# Patient Record
Sex: MALE | Race: BLACK OR AFRICAN AMERICAN | Employment: UNEMPLOYED | URBAN - METROPOLITAN AREA
[De-identification: names, ages, dates, MRNs, and addresses within clinical notes are randomized per-mention and may not be internally consistent; named-entity substitution may affect disease eponyms.]

---

## 2017-03-03 ENCOUNTER — OFFICE VISIT (OUTPATIENT)
Dept: CARDIOLOGY CLINIC | Age: 82
End: 2017-03-03

## 2017-03-03 VITALS
WEIGHT: 155 LBS | BODY MASS INDEX: 22.19 KG/M2 | SYSTOLIC BLOOD PRESSURE: 134 MMHG | HEIGHT: 70 IN | DIASTOLIC BLOOD PRESSURE: 54 MMHG | HEART RATE: 86 BPM

## 2017-03-03 DIAGNOSIS — I25.10 ATHEROSCLEROSIS OF NATIVE CORONARY ARTERY OF NATIVE HEART WITHOUT ANGINA PECTORIS: Primary | ICD-10-CM

## 2017-03-03 DIAGNOSIS — N18.9 CHRONIC KIDNEY DISEASE, UNSPECIFIED: ICD-10-CM

## 2017-03-03 DIAGNOSIS — I50.42 CHRONIC COMBINED SYSTOLIC AND DIASTOLIC HEART FAILURE (HCC): ICD-10-CM

## 2017-03-03 DIAGNOSIS — I10 ESSENTIAL HYPERTENSION, BENIGN: ICD-10-CM

## 2017-03-03 DIAGNOSIS — E78.5 HYPERLIPIDEMIA, UNSPECIFIED HYPERLIPIDEMIA TYPE: ICD-10-CM

## 2017-03-03 RX ORDER — GLIMEPIRIDE 1 MG/1
TABLET ORAL
COMMUNITY
End: 2017-06-22

## 2017-03-03 RX ORDER — OXYCODONE AND ACETAMINOPHEN 10; 325 MG/1; MG/1
1 TABLET ORAL
COMMUNITY

## 2017-03-03 RX ORDER — CALCIUM ACETATE 667 MG/1
CAPSULE ORAL
COMMUNITY

## 2017-03-03 NOTE — PROGRESS NOTES
1. Have you been to the ER, urgent care clinic since your last visit? Hospitalized since your last visit? Yes Where: [facility] Reason for visit: CHF    2. Have you seen or consulted any other health care providers outside of the [address] [place] [address] since your last visit? Include any pap smears or colon screening. No     3. Since your last visit, have you had any of the following symptoms? .           4. Have you had any blood work, X-rays or cardiac testing? 5.  Where do you normally have your labs drawn? [facility]    6. Do you need any refills today?    No

## 2017-03-03 NOTE — MR AVS SNAPSHOT
Visit Information Date & Time Provider Department Dept. Phone Encounter #  
 3/3/2017 11:15 AM Randolph Cross MD [facility] [phone] Follow-up Instructions Return in about 3 months (around 6/3/2017). Upcoming Health Maintenance Date Due DTaP/Tdap/Td series (1 - Tdap) 10/15/1956 ZOSTER VACCINE AGE 60> 10/15/1995 GLAUCOMA SCREENING Q2Y 10/15/2000 Pneumococcal 65+ Low/Medium Risk (1 of 2 - PCV13) 10/15/2000 MEDICARE YEARLY EXAM 10/15/2000 INFLUENZA AGE 9 TO ADULT 8/1/2016 Allergies as of 3/3/2017  Review Complete On: 3/3/2017 By: Randolph Cross MD  
  
 Severity Noted Reaction Type Reactions Vancomycin  03/20/2013   Systemic Vertigo Current Immunizations  Never Reviewed No immunizations on file. Not reviewed this visit You Were Diagnosed With   
  
 Codes Comments Atherosclerosis of native coronary artery of native heart without angina pectoris    -  Primary ICD-10-CM: I25.10 ICD-9-CM: 414.01 stable 
no angina Essential hypertension, benign     ICD-10-CM: I10 
ICD-9-CM: 401.1 controlled Chronic combined systolic and diastolic heart failure (HCC)     ICD-10-CM: I50.42 
ICD-9-CM: 428.42 recent admission 
feels better 
lvef better Chronic kidney disease, unspecified     ICD-10-CM: N18.9 ICD-9-CM: 585.9 stable Hyperlipidemia, unspecified hyperlipidemia type     ICD-10-CM: E78.5 ICD-9-CM: 272.4 Vitals BP  
  
  
  
  
  
 134/54 Vitals History BMI and BSA Data Body Mass Index Body Surface Area  
 22.24 kg/m 2 1.86 m 2 Preferred Pharmacy Pharmacy Name Phone [phone], [address] Your Updated Medication List  
  
   
This list is accurate as of: 3/3/17 11:30 AM.  Always use your most recent med list. amLODIPine 10 mg tablet Commonly known as:  Princess Mitchell Take  by mouth daily. aspirin delayed-release 81 mg tablet Take  by mouth daily. calcitRIOL 0.25 mcg capsule Commonly known as:  ROCALTROL Take 0.25 mcg by mouth daily. calcium acetate 667 mg Cap Commonly known as:  PHOSLO Take  by mouth three (3) times daily (with meals). carvedilol 6.25 mg tablet Commonly known as:  Kathlean Due Take 12.5 mg by mouth two (2) times daily (with meals). cloNIDine HCl 0.3 mg tablet Commonly known as:  CATAPRES Take 0.2 mg by mouth two (2) times a day. ergocalciferol 50,000 unit capsule Commonly known as:  ERGOCALCIFEROL Take 50,000 Units by mouth. 2 times per week  
  
 glimepiride 1 mg tablet Commonly known as:  AMARYL Take  by mouth Daily (before breakfast). HumaLOG 100 unit/mL injection Generic drug:  insulin lispro  
by SubCUTAneous route. hydrALAZINE 10 mg tablet Commonly known as:  APRESOLINE Take 50 mg by mouth every six (6) hours. LANTUS 100 unit/mL injection Generic drug:  insulin glargine  
by SubCUTAneous route once. LASIX 40 mg tablet Generic drug:  furosemide Take 20 mg by mouth daily. levobunolol 0.5 % ophthalmic solution Commonly known as:  Marysol Du Administer 1 Drop to both eyes nightly. LIPITOR 10 mg tablet Generic drug:  atorvastatin Take  by mouth daily. omeprazole 20 mg capsule Commonly known as:  PRILOSEC Take 20 mg by mouth daily. oxyCODONE-acetaminophen  mg per tablet Commonly known as:  PERCOCET 10 Take 1 Tab by mouth. timolol 0.5 % ophthalmic solution Commonly known as:  TIMOPTIC  
1 Drop two (2) times a day. Follow-up Instructions Return in about 3 months (around 6/3/2017). Introducing [facility]! Jazmine Fisher introduces [facility] patient portal. Now you can access parts of your medical record, email your doctor's office, and request medication refills online.    
 
1. In your internet browser, go to [email or web address] 2. Click on the First Time User? Click Here link in the Sign In box. You will see the New Member Sign Up page. 3. Enter your [facility] Access Code exactly as it appears below. You will not need to use this code after youve completed the sign-up process. If you do not sign up before the expiration date, you must request a new code. · [facility] Access Code: [email or web address] Expires: 6/1/2017 10:42 AM 
 
4. Enter the last four digits of your Social Security Number (xxxx) and Date of Birth (mm/dd/yyyy) as indicated and click Submit. You will be taken to the next sign-up page. 5. Create a Dandeliont ID. This will be your [facility] login ID and cannot be changed, so think of one that is secure and easy to remember. 6. Create a [facility] password. You can change your password at any time. 7. Enter your Password Reset Question and Answer. This can be used at a later time if you forget your password. 8. Enter your e-mail address. You will receive e-mail notification when new information is available in [address] 9. Click Sign Up. You can now view and download portions of your medical record. 10. Click the Download Summary menu link to download a portable copy of your medical information. If you have questions, please visit the Frequently Asked Questions section of the [facility] website. Remember, [facility] is NOT to be used for urgent needs. For medical emergencies, dial 911. Now available from your iPhone and Android! Please provide this summary of care documentation to your next provider. Your primary care clinician is listed as JASPER CORONA. If you have any questions after today's visit, please call [phone].

## 2017-03-03 NOTE — PROGRESS NOTES
HISTORY OF PRESENT ILLNESS  Angela Velazco is a 80 y.o. male. HPI Comments: Patient with chf,cmp,htn,ckd. On follow up patient denies any chest pains,sob, palpitation or other significant symptoms. CHF   The history is provided by the patient. This is a chronic problem. The problem occurs constantly. The problem has not changed since onset. Pertinent negatives include no chest pain and no shortness of breath. Cardiomyopathy   The history is provided by the patient. This is a chronic problem. The problem occurs constantly. The problem has not changed since onset. Pertinent negatives include no chest pain and no shortness of breath. Hospital Follow Up   Pertinent negatives include no chest pain and no shortness of breath. Hypertension   The history is provided by the patient. This is a chronic problem. The problem occurs constantly. The problem has not changed since onset. Pertinent negatives include no chest pain and no shortness of breath. Review of Systems   Constitutional: Negative for chills and fever. HENT: Negative for nosebleeds. Eyes: Negative for blurred vision and double vision. Respiratory: Negative for cough, hemoptysis, sputum production, shortness of breath and wheezing. Cardiovascular: Negative for chest pain, palpitations, orthopnea, claudication, leg swelling and PND. Gastrointestinal: Negative for heartburn, nausea and vomiting. Musculoskeletal: Negative for myalgias. Skin: Negative for rash. Neurological: Negative for dizziness and weakness. Endo/Heme/Allergies: Does not bruise/bleed easily.      Family History   Problem Relation Age of Onset    Diabetes Mother     Heart Disease Mother     Cancer Other        Past Medical History:   Diagnosis Date    CAD (coronary artery disease)     Chronic combined systolic and diastolic heart failure ([facility] [address] 3/17/2015    stable limited activity recent admsission     Chronic kidney disease, unspecified 3/17/2015    not on acei/arb     Coronary atherosclerosis of native coronary artery 3/17/2015    abnormal stress test medically managed no angina     Diabetes ([facility] [address]     Diabetes mellitus     Essential hypertension     Essential hypertension, benign     On Hydralazine, Labetalol, clonidine and norvasc HX of ARF and hyperkalemia with ARB; stable    High cholesterol     Hypertension     Other and unspecified hyperlipidemia     On Zocor and Niaspan    Other primary cardiomyopathies ([facility] [address] 3/17/2015    ef 45%     Peripheral vascular disease, unspecified (HCC)     Bilateral AKA    Peripheral vascular disease, unspecified ([facility] [address]     Bilateral AKA     Reflux     Renal insufficiency        Past Surgical History:   Procedure Laterality Date    HX ORTHOPAEDIC      double amputation,  hip replacement    HX PROSTATECTOMY         Social History   Substance Use Topics    Smoking status: Former Smoker     Packs/day: 2.00     Quit date: 2/27/1998    Smokeless tobacco: Never Used    Alcohol use No       Allergies   Allergen Reactions    Vancomycin Vertigo       Outpatient Prescriptions Marked as Taking for the 3/3/17 encounter (Office Visit) with Mahin Cosme MD   Medication Sig Dispense Refill    oxyCODONE-acetaminophen (PERCOCET 10)  mg per tablet Take 1 Tab by mouth.  glimepiride (AMARYL) 1 mg tablet Take  by mouth Daily (before breakfast).  calcium acetate (PHOSLO) 667 mg cap Take  by mouth three (3) times daily (with meals).  calcitRIOL (ROCALTROL) 0.25 mcg capsule Take 0.25 mcg by mouth daily.  hydrALAZINE (APRESOLINE) 10 mg tablet Take 50 mg by mouth every six (6) hours.  cloNIDine (CATAPRESS) 0.3 mg tablet Take 0.2 mg by mouth two (2) times a day.  carvedilol (COREG) 6.25 mg tablet Take 12.5 mg by mouth two (2) times daily (with meals).  levobunolol (BETAGAN) 0.5 % ophthalmic solution Administer 1 Drop to both eyes nightly.         insulin glargine (LANTUS) 100 unit/mL injection by SubCUTAneous route once.  furosemide (LASIX) 40 mg tablet Take 20 mg by mouth daily.  atorvastatin (LIPITOR) 10 mg tablet Take  by mouth daily.  aspirin delayed-release 81 mg tablet Take  by mouth daily.  ergocalciferol (ERGOCALCIFEROL) 50,000 unit capsule Take 50,000 Units by mouth. 2 times per week      insulin lispro (HUMALOG) 100 unit/mL injection by SubCUTAneous route.  omeprazole (PRILOSEC) 20 mg capsule Take 20 mg by mouth daily.  amLODIPine (NORVASC) 10 mg tablet Take  by mouth daily. Visit Vitals    /54    Pulse 86    Ht 5' 10\" (1.778 m)    Wt 70.3 kg (155 lb)    BMI 22.24 kg/m2         Physical Exam   Constitutional: He is oriented to person, place, and time. He appears well-developed and well-nourished. HENT:   Head: Normocephalic and atraumatic. Eyes: Conjunctivae are normal.   Neck: Neck supple. No JVD present. No tracheal deviation present. No thyromegaly present. Cardiovascular: Normal rate, regular rhythm and normal heart sounds. Exam reveals no gallop and no friction rub. No murmur heard. Pulmonary/Chest: Breath sounds normal. No respiratory distress. He has no wheezes. He has no rales. He exhibits no tenderness. Abdominal: Soft. There is no tenderness. Musculoskeletal: He exhibits no edema. bilat amputation   Neurological: He is alert and oriented to person, place, and time. Skin: Skin is warm and dry. Psychiatric: He has a normal mood and affect. Mr. Arlin Chakraborty has a reminder for a \"due or due soon\" health maintenance. I have asked that he contact his primary care provider for follow-up on this health maintenance.     CARDIOLOGY STUDIES 10/1/2010 11/1/2008   Myocardial Perfusion Scan Result - abn scan, suggestive Diapharagmatic attenuation with no reversible ischemia, EF 50%   Echocardiogram - Complete Result EF 60%, mild MR -     01/09/15 1343 ECHOCARDIOGRAM COMPLETE   REDUCED GLOBAL LEFT VENTRICULAR SYSTOLIC FUNCTION. GLOBAL HYPOKINESIS WITH A VISUALLY  ESTIMATED EJECTION FRACTION OF 45%. MILD DIASTOLIC DYSFUNCTION. MILD CONCENTRIC LEFT VENTRICULAR HYPERTROPHY. MILDLY DILATED LEFT ATRIUM. NO HEMODYNAMICALLY SIGNIFICANT VALVULAR PATHOLOGY. NORMAL PULMONARY ARTERY PRESSURE OF 17 MMHG. CIRCUMFERENTIAL PERICARDIAL EFFUSION WITH NO HEMODYNAMIC COMPROMISE. COMPARED TO PREVIOUS REPORT ON 10/08/2010; EJECTION FRACTION HAS REDUCED FROM 60% TO 45%  Stress test:1/2015  PHARMACOLOGIC NUCLEAR STRESS TEST IS ABNORMAL. REST/STRESS SPECT IMAGES INDICATE PERFUSION DEFECTS ARE PRESENT. A SMALL REVERSIBLE DEFECT IS NOTED IN THE BASAL INFERIOR AREA OF MILD TO MODERATE INTENSITY. A  SMALL REVERSIBLE DEFECT IS NOTED IN THE MID INFERIOR AREA OF MILD TO MODERATE INTENSITY. A SMALL  REVERSIBLE DEFECT IS NOTED IN THE APICAL INFERIOR AREA OF MILD TO MODERATE INTENSITY. LEFT  VENTRICLE APPEARS NORMAL ON BOTH STRESS AND REST. RIGHT VENTRICLE APPEARS NORMAL ON BOTH STRESS AND REST. GLOBAL HYPOKINESIS WITH EF 33%  I have personally reviewed patient's records available from hospital and other providers and incorporated findings in patient care. [facility]-7/2015  I Have personally reviewed recent relevant labs available and discussed with patient  [facility]-7/2015  Impression   :echo-2/2017  NORMAL LEFT VENTRICULAR CAVITY SIZE AND SYSTOLIC FUNCTION WITH AN EJECTION FRACTION OF 55 %. MODERATE LEFT VENTRICULAR HYPERTROPHY PRESENT. MILD DIASTOLIC DYSFUNCTION. MODERATELY DILATED LEFT ATRIUM. MITRAL ANNULAR CALCIFICATION WITH MILD MITRAL REGURGITATION. SCLEROTIC TRILEAFLET AORTIC VALVE WITHOUT EVIDENCE OF STENOSIS. TRACE OF TRICUSPID REGURGITATION WITH A NORMAL PULMONARY ARTERY PRESSURE  STRUCTURALLY NORMAL PULMONIC VALVE WITH MILD PULMONIC REGURGITATION. TRIVIAL ANTERIOR AND SMALL POSTERIOR PERICARDIAL EFFUSION. NO MASSES, SHUNTS OR THROMBI SEEN. Assessment         ICD-10-CM ICD-9-CM    1.  Atherosclerosis of native coronary artery of native heart without angina pectoris I25.10 414.01     stable  no angina   2. Essential hypertension, benign I10 401.1     controlled   3. Chronic combined systolic and diastolic heart failure (HCC) I50.42 428.42     recent admission  feels better  lvef better   4. Chronic kidney disease, unspecified N18.9 585.9     stable   5. Hyperlipidemia, unspecified hyperlipidemia type E78.5 272.4        There are no discontinued medications. No orders of the defined types were placed in this encounter. Follow-up Disposition:  Return in about 3 months (around 6/3/2017).

## 2017-03-03 NOTE — LETTER
Kailash Hassan 1935 
 
3/3/2017 Dear Lieutenant Stacia MD 
 
I had the pleasure of evaluating  MrWaldemar Gold in office today. Below are the relevant portions of my assessment and plan of care. ICD-10-CM ICD-9-CM 1. Atherosclerosis of native coronary artery of native heart without angina pectoris I25.10 414.01   
 stable 
no angina 2. Essential hypertension, benign I10 401.1   
 controlled 3. Chronic combined systolic and diastolic heart failure (HCC) I50.42 428.42   
 recent admission 
feels better 
lvef better 4. Chronic kidney disease, unspecified N18.9 585.9   
 stable 5. Hyperlipidemia, unspecified hyperlipidemia type E78.5 272.4 Current Outpatient Prescriptions Medication Sig Dispense Refill  oxyCODONE-acetaminophen (PERCOCET 10)  mg per tablet Take 1 Tab by mouth.  glimepiride (AMARYL) 1 mg tablet Take  by mouth Daily (before breakfast).  calcium acetate (PHOSLO) 667 mg cap Take  by mouth three (3) times daily (with meals).  calcitRIOL (ROCALTROL) 0.25 mcg capsule Take 0.25 mcg by mouth daily.  hydrALAZINE (APRESOLINE) 10 mg tablet Take 50 mg by mouth every six (6) hours.  cloNIDine (CATAPRESS) 0.3 mg tablet Take 0.2 mg by mouth two (2) times a day.  carvedilol (COREG) 6.25 mg tablet Take 12.5 mg by mouth two (2) times daily (with meals).  levobunolol (BETAGAN) 0.5 % ophthalmic solution Administer 1 Drop to both eyes nightly.  insulin glargine (LANTUS) 100 unit/mL injection by SubCUTAneous route once.  furosemide (LASIX) 40 mg tablet Take 20 mg by mouth daily.  atorvastatin (LIPITOR) 10 mg tablet Take  by mouth daily.  aspirin delayed-release 81 mg tablet Take  by mouth daily.  ergocalciferol (ERGOCALCIFEROL) 50,000 unit capsule Take 50,000 Units by mouth. 2 times per week  insulin lispro (HUMALOG) 100 unit/mL injection by SubCUTAneous route.  omeprazole (PRILOSEC) 20 mg capsule Take 20 mg by mouth daily.  amLODIPine (NORVASC) 10 mg tablet Take  by mouth daily.  timolol (TIMOPTIC) 0.5 % ophthalmic solution 1 Drop two (2) times a day. Orders Placed This Encounter  oxyCODONE-acetaminophen (PERCOCET 10)  mg per tablet Sig: Take 1 Tab by mouth.  glimepiride (AMARYL) 1 mg tablet Sig: Take  by mouth Daily (before breakfast).  calcium acetate (PHOSLO) 667 mg cap Sig: Take  by mouth three (3) times daily (with meals). If you have questions, please do not hesitate to call me. I look forward to following  Vinny Melvin along with you. Sincerely, Bhavana Skelton MD

## 2017-06-22 ENCOUNTER — OFFICE VISIT (OUTPATIENT)
Dept: CARDIOLOGY CLINIC | Age: 82
End: 2017-06-22

## 2017-06-22 VITALS — HEART RATE: 92 BPM | SYSTOLIC BLOOD PRESSURE: 145 MMHG | DIASTOLIC BLOOD PRESSURE: 55 MMHG

## 2017-06-22 DIAGNOSIS — N18.9 CHRONIC KIDNEY DISEASE, UNSPECIFIED: ICD-10-CM

## 2017-06-22 DIAGNOSIS — I50.32 DIASTOLIC CHF, CHRONIC (HCC): ICD-10-CM

## 2017-06-22 DIAGNOSIS — I10 ESSENTIAL HYPERTENSION, BENIGN: ICD-10-CM

## 2017-06-22 DIAGNOSIS — I25.10 ATHEROSCLEROSIS OF NATIVE CORONARY ARTERY OF NATIVE HEART WITHOUT ANGINA PECTORIS: Primary | ICD-10-CM

## 2017-06-22 DIAGNOSIS — E78.5 HYPERLIPIDEMIA, UNSPECIFIED HYPERLIPIDEMIA TYPE: ICD-10-CM

## 2017-06-22 RX ORDER — ISOSORBIDE MONONITRATE 30 MG/1
TABLET, EXTENDED RELEASE ORAL DAILY
COMMUNITY
End: 2018-09-10

## 2017-06-22 RX ORDER — MAGNESIUM SULFATE 100 %
15 CRYSTALS MISCELLANEOUS AS NEEDED
COMMUNITY

## 2017-06-22 RX ORDER — TORSEMIDE 20 MG/1
TABLET ORAL DAILY
COMMUNITY
End: 2017-12-22

## 2017-06-22 RX ORDER — SODIUM BICARBONATE 650 MG/1
TABLET ORAL 3 TIMES DAILY
COMMUNITY

## 2017-06-22 RX ORDER — FERROUS SULFATE, DRIED 160(50) MG
1 TABLET, EXTENDED RELEASE ORAL 2 TIMES DAILY WITH MEALS
COMMUNITY
End: 2017-12-22

## 2017-06-22 NOTE — LETTER
Vernadine Formosa 1935 
 
6/22/2017 Dear Maryse Saldivar MD 
 
I had the pleasure of evaluating  Mr. Ana Lilia Perry in office today. Below are the relevant portions of my assessment and plan of care. ICD-10-CM ICD-9-CM 1. Atherosclerosis of native coronary artery of native heart without angina pectoris I25.10 414.01   
 stable 2. Diastolic CHF, chronic (HCC) I50.32 428.32   
  428.0   
 feels better after discharge 
now on torsemide 3. Essential hypertension, benign I10 401.1   
 mildly elevated 4. Hyperlipidemia, unspecified hyperlipidemia type E78.5 272.4   
 stable 5. Chronic kidney disease, unspecified N18.9 585.9 refusing dialysis Current Outpatient Prescriptions Medication Sig Dispense Refill  sodium bicarbonate 650 mg tablet Take  by mouth two (2) times a day. 2 tabs  isosorbide mononitrate ER (IMDUR) 30 mg tablet Take  by mouth daily.  calcium-vitamin D (OYSTER SHELL CALCIUM-VIT D3) 500 mg(1,250mg) -200 unit per tablet Take 1 Tab by mouth two (2) times daily (with meals).  torsemide (DEMADEX) 20 mg tablet Take  by mouth daily. 2 tabs every other day  glucose 4 gram chewable tablet Take 15 g by mouth as needed. 1-5 tabs as needed  oxyCODONE-acetaminophen (PERCOCET 10)  mg per tablet Take 1 Tab by mouth.  calcium acetate (PHOSLO) 667 mg cap Take  by mouth three (3) times daily (with meals).  calcitRIOL (ROCALTROL) 0.25 mcg capsule Take 0.25 mcg by mouth daily.  timolol (TIMOPTIC) 0.5 % ophthalmic solution 1 Drop two (2) times a day.  hydrALAZINE (APRESOLINE) 10 mg tablet Take 10 mg by mouth. 2 1/2 tabs twice a day  cloNIDine (CATAPRESS) 0.3 mg tablet Take 0.2 mg by mouth two (2) times a day.  carvedilol (COREG) 6.25 mg tablet Take 12.5 mg by mouth two (2) times daily (with meals).  levobunolol (BETAGAN) 0.5 % ophthalmic solution Administer 1 Drop to both eyes nightly.  insulin glargine (LANTUS) 100 unit/mL injection by SubCUTAneous route once.  atorvastatin (LIPITOR) 10 mg tablet Take  by mouth daily.  aspirin delayed-release 81 mg tablet Take  by mouth daily.  ergocalciferol (ERGOCALCIFEROL) 50,000 unit capsule Take 50,000 Units by mouth. 2 times per week  insulin lispro (HUMALOG) 100 unit/mL injection by SubCUTAneous route.  omeprazole (PRILOSEC) 20 mg capsule Take 20 mg by mouth daily.  amLODIPine (NORVASC) 10 mg tablet Take  by mouth daily. Orders Placed This Encounter  sodium bicarbonate 650 mg tablet Sig: Take  by mouth two (2) times a day. 2 tabs  isosorbide mononitrate ER (IMDUR) 30 mg tablet Sig: Take  by mouth daily.  calcium-vitamin D (OYSTER SHELL CALCIUM-VIT D3) 500 mg(1,250mg) -200 unit per tablet Sig: Take 1 Tab by mouth two (2) times daily (with meals).  torsemide (DEMADEX) 20 mg tablet Sig: Take  by mouth daily. 2 tabs every other day  glucose 4 gram chewable tablet Sig: Take 15 g by mouth as needed. 1-5 tabs as needed If you have questions, please do not hesitate to call me. I look forward to following Mr. Giangelda Momadelyn along with you. Sincerely, Moshe Fisher MD

## 2017-06-22 NOTE — MR AVS SNAPSHOT
Visit Information Date & Time Provider Department Dept. Phone Encounter #  
 6/22/2017 10:30 AM Elizabeth Wolfe MD [facility] [phone] [id number] Follow-up Instructions Return in about 3 months (around 9/22/2017). Upcoming Health Maintenance Date Due DTaP/Tdap/Td series (1 - Tdap) 10/15/1956 ZOSTER VACCINE AGE 60> 10/15/1995 GLAUCOMA SCREENING Q2Y 10/15/2000 Pneumococcal 65+ Low/Medium Risk (1 of 2 - PCV13) 10/15/2000 MEDICARE YEARLY EXAM 10/15/2000 INFLUENZA AGE 9 TO ADULT 8/1/2017 Allergies as of 6/22/2017  Review Complete On: 6/22/2017 By: Elizabeth Wolfe MD  
  
 Severity Noted Reaction Type Reactions Vancomycin  03/20/2013   Systemic Vertigo Current Immunizations  Never Reviewed No immunizations on file. Not reviewed this visit You Were Diagnosed With   
  
 Codes Comments Atherosclerosis of native coronary artery of native heart without angina pectoris    -  Primary ICD-10-CM: I25.10 ICD-9-CM: 414.01 stable Diastolic CHF, chronic (HCC)     ICD-10-CM: I50.32 
ICD-9-CM: 428.32, 428.0 feels better after discharge 
now on torsemide Essential hypertension, benign     ICD-10-CM: I10 
ICD-9-CM: 401.1 mildly elevated Hyperlipidemia, unspecified hyperlipidemia type     ICD-10-CM: E78.5 ICD-9-CM: 272.4 stable Chronic kidney disease, unspecified     ICD-10-CM: N18.9 ICD-9-CM: 585.9 refusing dialysis Vitals BP Pulse Smoking Status 145/55 80 Former Smoker Vitals History Preferred Pharmacy Pharmacy Name Phone [phone] [facility], [address] Your Updated Medication List  
  
   
This list is accurate as of: 6/22/17 10:52 AM.  Always use your most recent med list. amLODIPine 10 mg tablet Commonly known as:  Torie Randolph Take  by mouth daily. aspirin delayed-release 81 mg tablet Take  by mouth daily. calcitRIOL 0.25 mcg capsule Commonly known as:  ROCALTROL Take 0.25 mcg by mouth daily. calcium acetate 667 mg Cap Commonly known as:  PHOSLO Take  by mouth three (3) times daily (with meals). carvedilol 6.25 mg tablet Commonly known as:  Al Snyder Take 12.5 mg by mouth two (2) times daily (with meals). cloNIDine HCl 0.3 mg tablet Commonly known as:  CATAPRES Take 0.2 mg by mouth two (2) times a day. ergocalciferol 50,000 unit capsule Commonly known as:  ERGOCALCIFEROL Take 50,000 Units by mouth. 2 times per week  
  
 glucose 4 gram chewable tablet Take 15 g by mouth as needed. 1-5 tabs as needed HumaLOG 100 unit/mL injection Generic drug:  insulin lispro  
by SubCUTAneous route. hydrALAZINE 10 mg tablet Commonly known as:  APRESOLINE Take 10 mg by mouth. 2 1/2 tabs twice a day  
  
 isosorbide mononitrate ER 30 mg tablet Commonly known as:  IMDUR Take  by mouth daily. LANTUS 100 unit/mL injection Generic drug:  insulin glargine  
by SubCUTAneous route once. levobunolol 0.5 % ophthalmic solution Commonly known as:  Cabrera Raj Administer 1 Drop to both eyes nightly. LIPITOR 10 mg tablet Generic drug:  atorvastatin Take  by mouth daily. omeprazole 20 mg capsule Commonly known as:  PRILOSEC Take 20 mg by mouth daily. oxyCODONE-acetaminophen  mg per tablet Commonly known as:  PERCOCET 10 Take 1 Tab by mouth. OYSTER SHELL CALCIUM-VIT D3 500 mg(1,250mg) -200 unit per tablet Generic drug:  calcium-vitamin D Take 1 Tab by mouth two (2) times daily (with meals). sodium bicarbonate 650 mg tablet Take  by mouth two (2) times a day. 2 tabs  
  
 timolol 0.5 % ophthalmic solution Commonly known as:  TIMOPTIC  
1 Drop two (2) times a day. torsemide 20 mg tablet Commonly known as:  DEMADEX Take  by mouth daily. 2 tabs every other day Follow-up Instructions Return in about 3 months (around 9/22/2017). Introducing hospitals & HEALTH SERVICES! Orville Tom introduces [employer] patient portal. Now you can access parts of your medical record, email your doctor's office, and request medication refills online. 1. In your internet browser, go to [email or web address] 2. Click on the First Time User? Click Here link in the Sign In box. You will see the New Member Sign Up page. 3. Enter your [employer] Access Code exactly as it appears below. You will not need to use this code after youve completed the sign-up process. If you do not sign up before the expiration date, you must request a new code. · [employer] Access Code: [email or web address] Expires: 9/20/2017 10:20 AM 
 
4. Enter the last four digits of your Social Security Number (xxxx) and Date of Birth (mm/dd/yyyy) as indicated and click Submit. You will be taken to the next sign-up page. 5. Create a [employer] ID. This will be your [employer] login ID and cannot be changed, so think of one that is secure and easy to remember. 6. Create a [employer] password. You can change your password at any time. 7. Enter your Password Reset Question and Answer. This can be used at a later time if you forget your password. 8. Enter your e-mail address. You will receive e-mail notification when new information is available in [address] 9. Click Sign Up. You can now view and download portions of your medical record. 10. Click the Download Summary menu link to download a portable copy of your medical information. If you have questions, please visit the Frequently Asked Questions section of the [employer] website. Remember, [employer] is NOT to be used for urgent needs. For medical emergencies, dial 911. Now available from your iPhone and Android! Please provide this summary of care documentation to your next provider. Your primary care clinician is listed as JASPER CORONA. If you have any questions after today's visit, please call [phone].

## 2017-06-22 NOTE — PROGRESS NOTES
1. Have you been to the ER, urgent care clinic since your last visit? Hospitalized since your last visit? Yes at[facility]  2. Have you seen or consulted any other health care providers outside of the [address] [place] [address] since your last visit? Include any pap smears or colon screening. Yes Where: pcp     3. Since your last visit, have you had any of the following symptoms? shortness of breath. 4.  Have you had any blood work, X-rays or cardiac testing? Yes Where: atul       5. Where do you normally have your labs drawn? atul    6. Do you need any refills today?    no

## 2017-09-25 ENCOUNTER — OFFICE VISIT (OUTPATIENT)
Dept: CARDIOLOGY CLINIC | Age: 82
End: 2017-09-25

## 2017-09-25 VITALS — SYSTOLIC BLOOD PRESSURE: 148 MMHG | HEART RATE: 92 BPM | DIASTOLIC BLOOD PRESSURE: 78 MMHG | HEIGHT: 70 IN

## 2017-09-25 DIAGNOSIS — I50.42 CHRONIC COMBINED SYSTOLIC AND DIASTOLIC HEART FAILURE (HCC): Primary | ICD-10-CM

## 2017-09-25 DIAGNOSIS — E78.5 HYPERLIPIDEMIA, UNSPECIFIED HYPERLIPIDEMIA TYPE: ICD-10-CM

## 2017-09-25 DIAGNOSIS — I25.10 ATHEROSCLEROSIS OF NATIVE CORONARY ARTERY OF NATIVE HEART WITHOUT ANGINA PECTORIS: ICD-10-CM

## 2017-09-25 DIAGNOSIS — I10 ESSENTIAL HYPERTENSION, BENIGN: ICD-10-CM

## 2017-09-25 DIAGNOSIS — I42.9 CARDIOMYOPATHY, UNSPECIFIED TYPE (HCC): ICD-10-CM

## 2017-09-25 DIAGNOSIS — N18.5 CKD (CHRONIC KIDNEY DISEASE), STAGE 5: ICD-10-CM

## 2017-09-25 NOTE — MR AVS SNAPSHOT
Visit Information Date & Time Provider Department Dept. Phone Encounter #  
 9/25/2017 11:00 AM Sancho Galvin MD [facility] [id number] Follow-up Instructions Return in about 6 months (around 3/25/2018). Upcoming Health Maintenance Date Due DTaP/Tdap/Td series (1 - Tdap) 10/15/1956 ZOSTER VACCINE AGE 60> 8/15/1995 GLAUCOMA SCREENING Q2Y 10/15/2000 Pneumococcal 65+ Low/Medium Risk (1 of 2 - PCV13) 10/15/2000 MEDICARE YEARLY EXAM 10/15/2000 INFLUENZA AGE 9 TO ADULT 8/1/2017 Allergies as of 9/25/2017  Review Complete On: 9/25/2017 By: Yahaira Quintero Severity Noted Reaction Type Reactions Vancomycin  03/20/2013   Systemic Vertigo Current Immunizations  Never Reviewed No immunizations on file. Not reviewed this visit You Were Diagnosed With   
  
 Codes Comments Chronic combined systolic and diastolic heart failure (HCC)    -  Primary ICD-10-CM: I50.42 
ICD-9-CM: 428.42 stable Atherosclerosis of native coronary artery of native heart without angina pectoris     ICD-10-CM: I25.10 ICD-9-CM: 414.01 stable Essential hypertension, benign     ICD-10-CM: I10 
ICD-9-CM: 401.1 controlled Hyperlipidemia, unspecified hyperlipidemia type     ICD-10-CM: E78.5 ICD-9-CM: 272.4 lab with pcp [facility]     ICD-10-CM: I42.9 ICD-9-CM: 425.4 better CKD (chronic kidney disease), stage 5 (HCC)     ICD-10-CM: N18.5 ICD-9-CM: 432. 5 Vitals BP Pulse Height(growth percentile) Smoking Status 148/78 92 5' 10\" (1.778 m) Former Smoker Vitals History Preferred Pharmacy Pharmacy Name Phone [address] [facility], [address] Your Updated Medication List  
  
   
This list is accurate as of: 9/25/17 11:20 AM.  Always use your most recent med list. amLODIPine 10 mg tablet Commonly known as:  [place] Railing Take  by mouth daily. aspirin delayed-release 81 mg tablet Take  by mouth daily. calcitRIOL 0.25 mcg capsule Commonly known as:  ROCALTROL Take 0.25 mcg by mouth daily. calcium acetate 667 mg Cap Commonly known as:  PHOSLO Take  by mouth three (3) times daily (with meals). carvedilol 6.25 mg tablet Commonly known as:  Mamadou Beets Take 12.5 mg by mouth two (2) times daily (with meals). cloNIDine HCl 0.3 mg tablet Commonly known as:  CATAPRES Take 0.2 mg by mouth two (2) times a day. ergocalciferol 50,000 unit capsule Commonly known as:  ERGOCALCIFEROL Take 50,000 Units by mouth. 2 times per week  
  
 glucose 4 gram chewable tablet Take 15 g by mouth as needed. 1-5 tabs as needed HumaLOG 100 unit/mL injection Generic drug:  insulin lispro  
by SubCUTAneous route. hydrALAZINE 10 mg tablet Commonly known as:  APRESOLINE Take 10 mg by mouth. 2 1/2 tabs twice a day  
  
 isosorbide mononitrate ER 30 mg tablet Commonly known as:  IMDUR Take  by mouth daily. LANTUS 100 unit/mL injection Generic drug:  insulin glargine  
by SubCUTAneous route once. levobunolol 0.5 % ophthalmic solution Commonly known as:  Babita Carey Administer 1 Drop to both eyes nightly. LIPITOR 10 mg tablet Generic drug:  atorvastatin Take  by mouth daily. omeprazole 20 mg capsule Commonly known as:  PRILOSEC Take 20 mg by mouth daily. oxyCODONE-acetaminophen  mg per tablet Commonly known as:  PERCOCET 10 Take 1 Tab by mouth. OYSTER SHELL CALCIUM-VIT D3 500 mg(1,250mg) -200 unit per tablet Generic drug:  calcium-vitamin D Take 1 Tab by mouth two (2) times daily (with meals). sodium bicarbonate 650 mg tablet Take  by mouth two (2) times a day. 2 tabs  
  
 timolol 0.5 % ophthalmic solution Commonly known as:  TIMOPTIC  
1 Drop two (2) times a day. torsemide 20 mg tablet Commonly known as:  DEMADEX Take  by mouth daily. 2 tabs every other day Follow-up Instructions Return in about 6 months (around 3/25/2018). Introducing \[facility] [place] [facility]\"" & [facility] SERVICES! Vilma Kelly introduces [employer] patient portal. Now you can access parts of your medical record, email your doctor's office, and request medication refills online. 1. In your internet browser, go to [email or web address] 2. Click on the First Time User? Click Here link in the Sign In box. You will see the New Member Sign Up page. 3. Enter your [employer] Access Code exactly as it appears below. You will not need to use this code after youve completed the sign-up process. If you do not sign up before the expiration date, you must request a new code. · [employer] Access Code: [email or web address] Expires: 12/24/2017 11:20 AM 
 
4. Enter the last four digits of your Social Security Number (xxxx) and Date of Birth (mm/dd/yyyy) as indicated and click Submit. You will be taken to the next sign-up page. 5. Create a [employer] ID. This will be your [employer] login ID and cannot be changed, so think of one that is secure and easy to remember. 6. Create a [employer] password. You can change your password at any time. 7. Enter your Password Reset Question and Answer. This can be used at a later time if you forget your password. 8. Enter your e-mail address. You will receive e-mail notification when new information is available in [address] 9. Click Sign Up. You can now view and download portions of your medical record. 10. Click the Download Summary menu link to download a portable copy of your medical information. If you have questions, please visit the Frequently Asked Questions section of the [employer] website. Remember, [employer] is NOT to be used for urgent needs. For medical emergencies, dial 911. Now available from your iPhone and Android! Please provide this summary of care documentation to your next provider. Your primary care clinician is listed as Jamar Elizabeth. If you have any questions after today's visit, please call [phone].

## 2017-09-25 NOTE — PROGRESS NOTES
HISTORY OF PRESENT ILLNESS  Joey Bah is a 80 y.o. male. HPI Comments: Patient with chf,cmp,htn,ckd. On follow up patient denies any chest pains,sob, palpitation or other significant symptoms. Recent admission with  chf-6/2017  Feels better now  Refusing dialysis    Cardiomyopathy   The history is provided by the patient. This is a chronic problem. The problem occurs constantly. The problem has not changed since onset. Pertinent negatives include no chest pain, no abdominal pain, no headaches and no shortness of breath. CHF   The history is provided by the patient. This is a chronic problem. The problem occurs constantly. The problem has not changed since onset. Pertinent negatives include no chest pain, no abdominal pain, no headaches and no shortness of breath. Shortness of Breath   Pertinent negatives include no fever, no headaches, no cough, no sputum production, no hemoptysis, no wheezing, no PND, no orthopnea, no chest pain, no vomiting, no abdominal pain, no rash, no leg swelling and no claudication. Hypertension   The history is provided by the patient. This is a chronic problem. The problem occurs constantly. The problem has not changed since onset. Pertinent negatives include no chest pain, no abdominal pain, no headaches and no shortness of breath. Review of Systems   Constitutional: Negative for chills and fever. HENT: Negative for nosebleeds. Eyes: Negative for blurred vision and double vision. Respiratory: Negative for cough, hemoptysis, sputum production, shortness of breath and wheezing. Cardiovascular: Negative for chest pain, palpitations, orthopnea, claudication, leg swelling and PND. Gastrointestinal: Negative for abdominal pain, heartburn, nausea and vomiting. Musculoskeletal: Negative for myalgias. Skin: Negative for rash. Neurological: Negative for dizziness, weakness and headaches. Endo/Heme/Allergies: Does not bruise/bleed easily.      Family History Problem Relation Age of Onset    Diabetes Mother     Heart Disease Mother     Cancer Other        Past Medical History:   Diagnosis Date    CAD (coronary artery disease)     Chronic combined systolic and diastolic heart failure ([facility] 3/17/2015    stable limited activity recent admsission     Chronic kidney disease, unspecified 3/17/2015    not on acei/arb     Coronary atherosclerosis of native coronary artery 3/17/2015    abnormal stress test medically managed no angina     Diabetes ([facility]     Diabetes mellitus     Essential hypertension     Essential hypertension, benign     On Hydralazine, Labetalol, clonidine and norvasc HX of ARF and hyperkalemia with ARB; stable    High cholesterol     Hypertension     Other and unspecified hyperlipidemia     On Zocor and Niaspan    Other primary cardiomyopathies 3/17/2015    ef 45%     Peripheral vascular disease, unspecified (HCC)     Bilateral AKA    Peripheral vascular disease, unspecified ([facility]     Bilateral AKA     Reflux     Renal insufficiency        Past Surgical History:   Procedure Laterality Date    HX ORTHOPAEDIC      double amputation,  hip replacement    HX PROSTATECTOMY         Social History   Substance Use Topics    Smoking status: Former Smoker     Packs/day: 2.00     Quit date: 2/27/1998    Smokeless tobacco: Never Used    Alcohol use No       Allergies   Allergen Reactions    Vancomycin Vertigo       Outpatient Prescriptions Marked as Taking for the 9/25/17 encounter (Office Visit) with Tonja Richard MD   Medication Sig Dispense Refill    sodium bicarbonate 650 mg tablet Take  by mouth two (2) times a day. 2 tabs      isosorbide mononitrate ER (IMDUR) 30 mg tablet Take  by mouth daily.  calcium-vitamin D (OYSTER SHELL CALCIUM-VIT D3) 500 mg(1,250mg) -200 unit per tablet Take 1 Tab by mouth two (2) times daily (with meals).  torsemide (DEMADEX) 20 mg tablet Take  by mouth daily.  2 tabs every other day      glucose 4 gram chewable tablet Take 15 g by mouth as needed. 1-5 tabs as needed      oxyCODONE-acetaminophen (PERCOCET 10)  mg per tablet Take 1 Tab by mouth.  calcium acetate (PHOSLO) 667 mg cap Take  by mouth three (3) times daily (with meals).  calcitRIOL (ROCALTROL) 0.25 mcg capsule Take 0.25 mcg by mouth daily.  timolol (TIMOPTIC) 0.5 % ophthalmic solution 1 Drop two (2) times a day.  hydrALAZINE (APRESOLINE) 10 mg tablet Take 10 mg by mouth. 2 1/2 tabs twice a day      cloNIDine (CATAPRESS) 0.3 mg tablet Take 0.2 mg by mouth two (2) times a day.  carvedilol (COREG) 6.25 mg tablet Take 12.5 mg by mouth two (2) times daily (with meals).  levobunolol (BETAGAN) 0.5 % ophthalmic solution Administer 1 Drop to both eyes nightly.  insulin glargine (LANTUS) 100 unit/mL injection by SubCUTAneous route once.  atorvastatin (LIPITOR) 10 mg tablet Take  by mouth daily.  aspirin delayed-release 81 mg tablet Take  by mouth daily.  ergocalciferol (ERGOCALCIFEROL) 50,000 unit capsule Take 50,000 Units by mouth. 2 times per week      insulin lispro (HUMALOG) 100 unit/mL injection by SubCUTAneous route.  omeprazole (PRILOSEC) 20 mg capsule Take 20 mg by mouth daily.  amLODIPine (NORVASC) 10 mg tablet Take  by mouth daily. Visit Vitals    /78    Pulse 92    Ht 5' 10\" (1.778 m)         Physical Exam   Constitutional: He is oriented to person, place, and time. He appears well-developed and well-nourished. HENT:   Head: Normocephalic and atraumatic. Eyes: Conjunctivae are normal.   Neck: Neck supple. No JVD present. No tracheal deviation present. No thyromegaly present. Cardiovascular: Normal rate, regular rhythm and normal heart sounds. Exam reveals no gallop and no friction rub. No murmur heard. Pulmonary/Chest: Breath sounds normal. No respiratory distress. He has no wheezes. He has no rales. He exhibits no tenderness.    Abdominal: Soft. There is no tenderness. Musculoskeletal: He exhibits no edema. bilat amputation   Neurological: He is alert and oriented to person, place, and time. Skin: Skin is warm and dry. Psychiatric: He has a normal mood and affect. Mr. Nancy Licona has a reminder for a \"due or due soon\" health maintenance. I have asked that he contact his primary care provider for follow-up on this health maintenance. CARDIOLOGY STUDIES 10/1/2010 11/1/2008   Myocardial Perfusion Scan Result - abn scan, suggestive Diapharagmatic attenuation with no reversible ischemia, EF 50%   Echocardiogram - Complete Result EF 60%, mild MR -   Some recent data might be hidden     01/09/15 1343 ECHOCARDIOGRAM COMPLETE   REDUCED GLOBAL LEFT VENTRICULAR SYSTOLIC FUNCTION. GLOBAL HYPOKINESIS WITH A VISUALLY  ESTIMATED EJECTION FRACTION OF 45%. MILD DIASTOLIC DYSFUNCTION. MILD CONCENTRIC LEFT VENTRICULAR HYPERTROPHY. MILDLY DILATED LEFT ATRIUM. NO HEMODYNAMICALLY SIGNIFICANT VALVULAR PATHOLOGY. NORMAL PULMONARY ARTERY PRESSURE OF 17 MMHG. CIRCUMFERENTIAL PERICARDIAL EFFUSION WITH NO HEMODYNAMIC COMPROMISE. COMPARED TO PREVIOUS REPORT ON 10/08/2010; EJECTION FRACTION HAS REDUCED FROM 60% TO 45%  Stress test:1/2015  PHARMACOLOGIC NUCLEAR STRESS TEST IS ABNORMAL. REST/STRESS SPECT IMAGES INDICATE PERFUSION DEFECTS ARE PRESENT. A SMALL REVERSIBLE DEFECT IS NOTED IN THE BASAL INFERIOR AREA OF MILD TO MODERATE INTENSITY. A  SMALL REVERSIBLE DEFECT IS NOTED IN THE MID INFERIOR AREA OF MILD TO MODERATE INTENSITY. A SMALL  REVERSIBLE DEFECT IS NOTED IN THE APICAL INFERIOR AREA OF MILD TO MODERATE INTENSITY. LEFT  VENTRICLE APPEARS NORMAL ON BOTH STRESS AND REST. RIGHT VENTRICLE APPEARS NORMAL ON BOTH STRESS AND REST. GLOBAL HYPOKINESIS WITH EF 33%  I have personally reviewed patient's records available from hospital and other providers and incorporated findings in patient care.   [facility]-7/2015  I Have personally reviewed recent relevant labs available and discussed with patient  Vania,cbc-7/2015  Impression   :echo-2/2017  NORMAL LEFT VENTRICULAR CAVITY SIZE AND SYSTOLIC FUNCTION WITH AN EJECTION FRACTION OF 55 %. MODERATE LEFT VENTRICULAR HYPERTROPHY PRESENT. MILD DIASTOLIC DYSFUNCTION. MODERATELY DILATED LEFT ATRIUM. MITRAL ANNULAR CALCIFICATION WITH MILD MITRAL REGURGITATION. SCLEROTIC TRILEAFLET AORTIC VALVE WITHOUT EVIDENCE OF STENOSIS. TRACE OF TRICUSPID REGURGITATION WITH A NORMAL PULMONARY ARTERY PRESSURE  STRUCTURALLY NORMAL PULMONIC VALVE WITH MILD PULMONIC REGURGITATION. TRIVIAL ANTERIOR AND SMALL POSTERIOR PERICARDIAL EFFUSION. NO MASSES, SHUNTS OR THROMBI SEEN. I have personally reviewed patient's records available from hospital and other providers and incorporated findings in patient care. 6/2017-notes,lab  Assessment         ICD-10-CM ICD-9-CM    1. Chronic combined systolic and diastolic heart failure (HCC) I50.42 428.42     stable   2. Atherosclerosis of native coronary artery of native heart without angina pectoris I25.10 414.01     stable   3. Essential hypertension, benign I10 401.1     controlled   4. Hyperlipidemia, unspecified hyperlipidemia type E78.5 272.4     lab with pcp   5. Cardiomyopathy, unspecified [facility] ([facility] I42.9 425.4     better   6. CKD (chronic kidney disease), stage 5 (HCC) N18.5 585. 5        There are no discontinued medications. No orders of the defined types were placed in this encounter. Follow-up Disposition:  Return in about 6 months (around 3/25/2018).

## 2017-09-25 NOTE — PROGRESS NOTES
1. Have you been to the ER, urgent care clinic since your last visit? Hospitalized since your last visit? Yes Where: [facility]/Blood transfusion    2. Have you seen or consulted any other health care providers outside of the [address] [place] [address] since your last visit? Include any pap smears or colon screening. Yes Where: Dr Constantino/PCP     3. Since your last visit, have you had any of the following symptoms? .           4. Have you had any blood work, X-rays or cardiac testing? Yes Where: [facility] Reason for visit: Labs             5.  Where do you normally have your labs drawn? [facility]    6. Do you need any refills today?    Yes

## 2017-09-25 NOTE — LETTER
Alicia Román 1935 
 
9/25/2017 Dear Leonard Cunningham MD 
 
I had the pleasure of evaluating  Mr. Colletta Screen in office today. Below are the relevant portions of my assessment and plan of care. ICD-10-CM ICD-9-CM 1. Chronic combined systolic and diastolic heart failure (HCC) I50.42 428.42   
 stable 2. Atherosclerosis of native coronary artery of native heart without angina pectoris I25.10 414.01   
 stable 3. Essential hypertension, benign I10 401.1   
 controlled 4. Hyperlipidemia, unspecified hyperlipidemia type E78.5 272.4   
 lab with pcp 5. Cardiomyopathy, unspecified type ([facility] [address] I42.9 425.4   
 better 6. CKD (chronic kidney disease), stage 5 (HCC) N18.5 585.5 Current Outpatient Prescriptions Medication Sig Dispense Refill  sodium bicarbonate 650 mg tablet Take  by mouth two (2) times a day. 2 tabs  isosorbide mononitrate ER (IMDUR) 30 mg tablet Take  by mouth daily.  calcium-vitamin D (OYSTER SHELL CALCIUM-VIT D3) 500 mg(1,250mg) -200 unit per tablet Take 1 Tab by mouth two (2) times daily (with meals).  torsemide (DEMADEX) 20 mg tablet Take  by mouth daily. 2 tabs every other day  glucose 4 gram chewable tablet Take 15 g by mouth as needed. 1-5 tabs as needed  oxyCODONE-acetaminophen (PERCOCET 10)  mg per tablet Take 1 Tab by mouth.  calcium acetate (PHOSLO) 667 mg cap Take  by mouth three (3) times daily (with meals).  calcitRIOL (ROCALTROL) 0.25 mcg capsule Take 0.25 mcg by mouth daily.  timolol (TIMOPTIC) 0.5 % ophthalmic solution 1 Drop two (2) times a day.  hydrALAZINE (APRESOLINE) 10 mg tablet Take 10 mg by mouth. 2 1/2 tabs twice a day  cloNIDine (CATAPRESS) 0.3 mg tablet Take 0.2 mg by mouth two (2) times a day.  carvedilol (COREG) 6.25 mg tablet Take 12.5 mg by mouth two (2) times daily (with meals).  levobunolol (BETAGAN) 0.5 % ophthalmic solution Administer 1 Drop to both eyes nightly.  insulin glargine (LANTUS) 100 unit/mL injection by SubCUTAneous route once.  atorvastatin (LIPITOR) 10 mg tablet Take  by mouth daily.  aspirin delayed-release 81 mg tablet Take  by mouth daily.  ergocalciferol (ERGOCALCIFEROL) 50,000 unit capsule Take 50,000 Units by mouth. 2 times per week  insulin lispro (HUMALOG) 100 unit/mL injection by SubCUTAneous route.  omeprazole (PRILOSEC) 20 mg capsule Take 20 mg by mouth daily.  amLODIPine (NORVASC) 10 mg tablet Take  by mouth daily. No orders of the defined types were placed in this encounter. If you have questions, please do not hesitate to call me. I look forward to following Mr. Ni Garcia along with you. Sincerely, Sven Damon MD

## 2017-12-18 ENCOUNTER — HOSPITAL ENCOUNTER (INPATIENT)
Age: 82
LOS: 4 days | Discharge: HOME | DRG: 871 | End: 2017-12-22
Attending: EMERGENCY MEDICINE | Admitting: HOSPITALIST
Payer: MEDICARE

## 2017-12-18 ENCOUNTER — APPOINTMENT (OUTPATIENT)
Dept: GENERAL RADIOLOGY | Age: 82
DRG: 871 | End: 2017-12-18
Attending: EMERGENCY MEDICINE
Payer: MEDICARE

## 2017-12-18 DIAGNOSIS — N17.9 ACUTE RENAL FAILURE, UNSPECIFIED ACUTE RENAL FAILURE TYPE (HCC): Primary | ICD-10-CM

## 2017-12-18 DIAGNOSIS — R09.02 HYPOXEMIA: ICD-10-CM

## 2017-12-18 DIAGNOSIS — R50.9 FEBRILE ILLNESS: ICD-10-CM

## 2017-12-18 DIAGNOSIS — K92.2 GASTROINTESTINAL HEMORRHAGE, UNSPECIFIED GASTROINTESTINAL HEMORRHAGE TYPE: ICD-10-CM

## 2017-12-18 DIAGNOSIS — D64.9 ANEMIA, UNSPECIFIED TYPE: ICD-10-CM

## 2017-12-18 PROBLEM — N19 RENAL FAILURE: Status: ACTIVE | Noted: 2017-12-18

## 2017-12-18 LAB
ABO + RH BLD: NORMAL
ANION GAP SERPL CALC-SCNC: 10 MMOL/L (ref 3–18)
APPEARANCE UR: ABNORMAL
ATRIAL RATE: 96 BPM
BASOPHILS # BLD: 0 K/UL (ref 0–0.06)
BASOPHILS # BLD: 0 K/UL (ref 0–0.06)
BASOPHILS NFR BLD: 0 % (ref 0–2)
BASOPHILS NFR BLD: 1 % (ref 0–2)
BILIRUB UR QL: NEGATIVE
BLOOD GROUP ANTIBODIES SERPL: NORMAL
BUN SERPL-MCNC: 91 MG/DL (ref 7–18)
BUN/CREAT SERPL: 14 (ref 12–20)
CALCIUM SERPL-MCNC: 7.5 MG/DL (ref 8.5–10.1)
CALCULATED P AXIS, ECG09: 52 DEGREES
CALCULATED T AXIS, ECG11: 105 DEGREES
CHLORIDE SERPL-SCNC: 103 MMOL/L (ref 100–108)
CO2 SERPL-SCNC: 25 MMOL/L (ref 21–32)
COLOR UR: YELLOW
CREAT SERPL-MCNC: 6.65 MG/DL (ref 0.6–1.3)
DIAGNOSIS, 93000: NORMAL
DIFFERENTIAL METHOD BLD: ABNORMAL
DIFFERENTIAL METHOD BLD: ABNORMAL
EOSINOPHIL # BLD: 0.2 K/UL (ref 0–0.4)
EOSINOPHIL # BLD: 0.3 K/UL (ref 0–0.4)
EOSINOPHIL NFR BLD: 3 % (ref 0–5)
EOSINOPHIL NFR BLD: 4 % (ref 0–5)
ERYTHROCYTE [DISTWIDTH] IN BLOOD BY AUTOMATED COUNT: 15.5 % (ref 11.6–14.5)
ERYTHROCYTE [DISTWIDTH] IN BLOOD BY AUTOMATED COUNT: 15.5 % (ref 11.6–14.5)
FLUAV AG NPH QL IA: NEGATIVE
FLUBV AG NOSE QL IA: NEGATIVE
GLUCOSE SERPL-MCNC: 288 MG/DL (ref 74–99)
GLUCOSE UR STRIP.AUTO-MCNC: 500 MG/DL
HCT VFR BLD AUTO: 25 % (ref 36–48)
HCT VFR BLD AUTO: 26.3 % (ref 36–48)
HGB BLD-MCNC: 7.5 G/DL (ref 13–16)
HGB BLD-MCNC: 7.9 G/DL (ref 13–16)
HGB UR QL STRIP: NEGATIVE
KETONES UR QL STRIP.AUTO: NEGATIVE MG/DL
LACTATE BLD-SCNC: 0.6 MMOL/L (ref 0.4–2)
LEUKOCYTE ESTERASE UR QL STRIP.AUTO: NEGATIVE
LYMPHOCYTES # BLD: 1 K/UL (ref 0.9–3.6)
LYMPHOCYTES # BLD: 1.2 K/UL (ref 0.9–3.6)
LYMPHOCYTES NFR BLD: 13 % (ref 21–52)
LYMPHOCYTES NFR BLD: 18 % (ref 21–52)
MCH RBC QN AUTO: 26.3 PG (ref 24–34)
MCH RBC QN AUTO: 26.5 PG (ref 24–34)
MCHC RBC AUTO-ENTMCNC: 30 G/DL (ref 31–37)
MCHC RBC AUTO-ENTMCNC: 30 G/DL (ref 31–37)
MCV RBC AUTO: 87.7 FL (ref 74–97)
MCV RBC AUTO: 88.3 FL (ref 74–97)
MONOCYTES # BLD: 1.1 K/UL (ref 0.05–1.2)
MONOCYTES # BLD: 1.3 K/UL (ref 0.05–1.2)
MONOCYTES NFR BLD: 15 % (ref 3–10)
MONOCYTES NFR BLD: 19 % (ref 3–10)
NEUTS SEG # BLD: 4 K/UL (ref 1.8–8)
NEUTS SEG # BLD: 4.8 K/UL (ref 1.8–8)
NEUTS SEG NFR BLD: 60 % (ref 40–73)
NEUTS SEG NFR BLD: 67 % (ref 40–73)
NITRITE UR QL STRIP.AUTO: NEGATIVE
P-R INTERVAL, ECG05: 182 MS
PH UR STRIP: 7.5 [PH] (ref 5–8)
PLATELET # BLD AUTO: 205 K/UL (ref 135–420)
PLATELET # BLD AUTO: 217 K/UL (ref 135–420)
PMV BLD AUTO: 10 FL (ref 9.2–11.8)
PMV BLD AUTO: 9.6 FL (ref 9.2–11.8)
POTASSIUM SERPL-SCNC: 4.3 MMOL/L (ref 3.5–5.5)
PROT UR STRIP-MCNC: 100 MG/DL
Q-T INTERVAL, ECG07: 364 MS
QRS DURATION, ECG06: 92 MS
QTC CALCULATION (BEZET), ECG08: 459 MS
RBC # BLD AUTO: 2.85 M/UL (ref 4.7–5.5)
RBC # BLD AUTO: 2.98 M/UL (ref 4.7–5.5)
RBC #/AREA URNS HPF: NORMAL /HPF (ref 0–5)
SODIUM SERPL-SCNC: 138 MMOL/L (ref 136–145)
SP GR UR REFRACTOMETRY: 1.01 (ref 1–1.03)
SPECIMEN EXP DATE BLD: NORMAL
TROPONIN I SERPL-MCNC: 0.03 NG/ML (ref 0–0.06)
UROBILINOGEN UR QL STRIP.AUTO: 0.2 EU/DL (ref 0.2–1)
VENTRICULAR RATE, ECG03: 96 BPM
WBC # BLD AUTO: 6.7 K/UL (ref 4.6–13.2)
WBC # BLD AUTO: 7.1 K/UL (ref 4.6–13.2)
WBC URNS QL MICRO: NORMAL /HPF (ref 0–4)

## 2017-12-18 PROCEDURE — 87804 INFLUENZA ASSAY W/OPTIC: CPT | Performed by: EMERGENCY MEDICINE

## 2017-12-18 PROCEDURE — 99284 EMERGENCY DEPT VISIT MOD MDM: CPT

## 2017-12-18 PROCEDURE — 87040 BLOOD CULTURE FOR BACTERIA: CPT | Performed by: EMERGENCY MEDICINE

## 2017-12-18 PROCEDURE — 84484 ASSAY OF TROPONIN QUANT: CPT | Performed by: EMERGENCY MEDICINE

## 2017-12-18 PROCEDURE — 87077 CULTURE AEROBIC IDENTIFY: CPT | Performed by: EMERGENCY MEDICINE

## 2017-12-18 PROCEDURE — 87086 URINE CULTURE/COLONY COUNT: CPT | Performed by: EMERGENCY MEDICINE

## 2017-12-18 PROCEDURE — 81001 URINALYSIS AUTO W/SCOPE: CPT | Performed by: EMERGENCY MEDICINE

## 2017-12-18 PROCEDURE — 96374 THER/PROPH/DIAG INJ IV PUSH: CPT

## 2017-12-18 PROCEDURE — 83605 ASSAY OF LACTIC ACID: CPT

## 2017-12-18 PROCEDURE — 96375 TX/PRO/DX INJ NEW DRUG ADDON: CPT

## 2017-12-18 PROCEDURE — 74011000258 HC RX REV CODE- 258: Performed by: EMERGENCY MEDICINE

## 2017-12-18 PROCEDURE — 93005 ELECTROCARDIOGRAM TRACING: CPT

## 2017-12-18 PROCEDURE — 71010 XR CHEST PORT: CPT

## 2017-12-18 PROCEDURE — 87186 SC STD MICRODIL/AGAR DIL: CPT | Performed by: EMERGENCY MEDICINE

## 2017-12-18 PROCEDURE — 74011250637 HC RX REV CODE- 250/637: Performed by: EMERGENCY MEDICINE

## 2017-12-18 PROCEDURE — 85025 COMPLETE CBC W/AUTO DIFF WBC: CPT | Performed by: EMERGENCY MEDICINE

## 2017-12-18 PROCEDURE — 86900 BLOOD TYPING SEROLOGIC ABO: CPT | Performed by: EMERGENCY MEDICINE

## 2017-12-18 PROCEDURE — 80048 BASIC METABOLIC PNL TOTAL CA: CPT | Performed by: EMERGENCY MEDICINE

## 2017-12-18 PROCEDURE — C9113 INJ PANTOPRAZOLE SODIUM, VIA: HCPCS | Performed by: EMERGENCY MEDICINE

## 2017-12-18 PROCEDURE — 74011250636 HC RX REV CODE- 250/636: Performed by: EMERGENCY MEDICINE

## 2017-12-18 PROCEDURE — 65660000000 HC RM CCU STEPDOWN

## 2017-12-18 RX ORDER — PANTOPRAZOLE SODIUM 40 MG/10ML
80 INJECTION, POWDER, LYOPHILIZED, FOR SOLUTION INTRAVENOUS
Status: COMPLETED | OUTPATIENT
Start: 2017-12-18 | End: 2017-12-18

## 2017-12-18 RX ORDER — ACETAMINOPHEN 325 MG/1
650 TABLET ORAL
Status: COMPLETED | OUTPATIENT
Start: 2017-12-18 | End: 2017-12-18

## 2017-12-18 RX ADMIN — SODIUM CHLORIDE 500 ML: 900 INJECTION, SOLUTION INTRAVENOUS at 14:18

## 2017-12-18 RX ADMIN — PANTOPRAZOLE SODIUM 80 MG: 40 INJECTION, POWDER, FOR SOLUTION INTRAVENOUS at 14:07

## 2017-12-18 RX ADMIN — SODIUM CHLORIDE 2.25 G: 900 INJECTION, SOLUTION INTRAVENOUS at 14:07

## 2017-12-18 RX ADMIN — ACETAMINOPHEN 650 MG: 325 TABLET ORAL at 12:03

## 2017-12-18 NOTE — ED PROVIDER NOTES
EMERGENCY DEPARTMENT HISTORY AND PHYSICAL EXAM    11:51 AM      Date: 12/18/2017  Patient Name: Luisito Tate    History of Presenting Illness     Chief Complaint   Patient presents with    Cough    Nasal Congestion         History Provided By: Patient    Chief Complaint: URI  Duration:  Days  Timing:  Acute and Improving  Location: respiratory  Quality: n/a  Severity: Mild  Modifying Factors: n/a  Associated Symptoms:  Pt reported productive white cough, congestion, mild SOB that has improved, and a fever of 101.7F. All other sx denied. No other complaints at this time. Additional History (Context): 11:51 AM Luisito Tate is a 80 y.o. male with a h/o HTN, DM, CAD, CHF, CKD, Bilateral AKA, and Smoking 2PPD presents to the ED with his wife with c/o URI onset 3 days ago. Pt reported productive white cough, congestion, mild SOB that has improved, and a fever of 101.7F. Pt denied n/v/d, CP, urinary sx's, numbness, or weakness. All other sx denied. No other complaints at this time.            PCP: Stephane Becker MD        Past History     Past Medical History:  Past Medical History:   Diagnosis Date    CAD (coronary artery disease)     Chronic combined systolic and diastolic heart failure ([facility] [address] 3/17/2015    stable limited activity recent admsission     Chronic kidney disease, unspecified 3/17/2015    not on acei/arb     Coronary atherosclerosis of native coronary artery 3/17/2015    abnormal stress test medically managed no angina     Diabetes ([address]     Diabetes mellitus     Essential hypertension     Essential hypertension, benign     On Hydralazine, Labetalol, clonidine and norvasc HX of ARF and hyperkalemia with ARB; stable    High cholesterol     Hypertension     Other and unspecified hyperlipidemia     On Zocor and Niaspan    Other primary cardiomyopathies 3/17/2015    ef 45%     Peripheral vascular disease, unspecified     Bilateral AKA    Peripheral vascular disease, unspecified Bilateral AKA     Reflux     Renal insufficiency        Past Surgical History:  Past Surgical History:   Procedure Laterality Date    HX ORTHOPAEDIC      double amputation,  hip replacement    HX PROSTATECTOMY         Family History:  Family History   Problem Relation Age of Onset    Diabetes Mother     Heart Disease Mother     Cancer Other        Social History:  Social History   Substance Use Topics    Smoking status: Former Smoker     Packs/day: 2.00     Quit date: 2/27/1998    Smokeless tobacco: Never Used    Alcohol use No       Allergies: Allergies   Allergen Reactions    Vancomycin Vertigo         Review of Systems     Review of Systems   Constitutional: Positive for fever. HENT: Positive for congestion. Negative for sore throat. Eyes: Negative for redness and visual disturbance. Respiratory: Positive for cough and shortness of breath. Negative for wheezing. Cardiovascular: Negative for chest pain. Gastrointestinal: Negative for abdominal pain, diarrhea, nausea and vomiting. Endocrine: Negative for polyuria. Genitourinary: Negative for dysuria. Musculoskeletal: Negative for arthralgias and neck stiffness. Skin: Negative for rash. Neurological: Negative for headaches. All other systems reviewed and are negative. Physical Exam     Visit Vitals    /63 (BP 1 Location: Left arm, BP Patient Position: At rest)    Pulse 98    Temp (!) 101.7 °F (38.7 °C)    Ht 6' (1.829 m)  Comment: prior to AKA    Wt 72.6 kg (160 lb)    SpO2 (!) 89%    BMI 21.7 kg/m2       Physical Exam   Constitutional: He appears well-developed and well-nourished. No distress. HENT:   Head: Normocephalic and atraumatic. Eyes: Conjunctivae are normal. Right eye exhibits no discharge. Left eye exhibits no discharge. Neck: Normal range of motion. Neck supple. Cardiovascular: Normal rate and regular rhythm. Pulmonary/Chest: Effort normal. No respiratory distress.    Abdominal: There is no rebound and no guarding. Musculoskeletal: He exhibits no edema. Bilateral AKAs   Neurological: He is alert. He exhibits normal muscle tone. Skin: Skin is warm and dry. He is not diaphoretic. Psychiatric: He has a normal mood and affect. His behavior is normal.   Nursing note and vitals reviewed. Diagnostic Study Results     Labs -  Recent Results (from the past 12 hour(s))   INFLUENZA A & B AG (RAPID TEST)    Collection Time: 12/18/17 12:05 PM   Result Value Ref Range    Influenza A Antigen NEGATIVE  NEG      Influenza B Antigen NEGATIVE  NEG     CBC WITH AUTOMATED DIFF    Collection Time: 12/18/17 12:40 PM   Result Value Ref Range    WBC 7.1 4.6 - 13.2 K/uL    RBC 2.85 (L) 4.70 - 5.50 M/uL    HGB 7.5 (L) 13.0 - 16.0 g/dL    HCT 25.0 (L) 36.0 - 48.0 %    MCV 87.7 74.0 - 97.0 FL    MCH 26.3 24.0 - 34.0 PG    MCHC 30.0 (L) 31.0 - 37.0 g/dL    RDW 15.5 (H) 11.6 - 14.5 %    PLATELET 571 258 - 807 K/uL    MPV 10.0 9.2 - 11.8 FL    NEUTROPHILS 67 40 - 73 %    LYMPHOCYTES 13 (L) 21 - 52 %    MONOCYTES 15 (H) 3 - 10 %    EOSINOPHILS 4 0 - 5 %    BASOPHILS 1 0 - 2 %    ABS. NEUTROPHILS 4.8 1.8 - 8.0 K/UL    ABS. LYMPHOCYTES 1.0 0.9 - 3.6 K/UL    ABS. MONOCYTES 1.1 0.05 - 1.2 K/UL    ABS. EOSINOPHILS 0.3 0.0 - 0.4 K/UL    ABS.  BASOPHILS 0.0 0.0 - 0.06 K/UL    DF AUTOMATED     METABOLIC PANEL, BASIC    Collection Time: 12/18/17 12:40 PM   Result Value Ref Range    Sodium 138 136 - 145 mmol/L    Potassium 4.3 3.5 - 5.5 mmol/L    Chloride 103 100 - 108 mmol/L    CO2 25 21 - 32 mmol/L    Anion gap 10 3.0 - 18 mmol/L    Glucose 288 (H) 74 - 99 mg/dL    BUN 91 (H) 7.0 - 18 MG/DL    Creatinine 6.65 (H) 0.6 - 1.3 MG/DL    BUN/Creatinine ratio 14 12 - 20      GFR est AA 10 (L) >60 ml/min/1.73m2    GFR est non-AA 8 (L) >60 ml/min/1.73m2    Calcium 7.5 (L) 8.5 - 10.1 MG/DL   EKG, 12 LEAD, INITIAL    Collection Time: 12/18/17 12:45 PM   Result Value Ref Range    Ventricular Rate 96 BPM    Atrial Rate 96 BPM    P-R Interval 182 ms    QRS Duration 92 ms    Q-T Interval 364 ms    QTC Calculation (Bezet) 459 ms    Calculated P Axis 52 degrees    Calculated T Axis 105 degrees    Diagnosis       Normal sinus rhythm  T wave abnormality, consider lateral ischemia  Abnormal ECG  When compared with ECG of 08-JUL-2011 10:06,  No significant change was found     TROPONIN I    Collection Time: 12/18/17 12:47 PM   Result Value Ref Range    Troponin-I, Qt. 0.03 0.00 - 0.06 NG/ML   URINALYSIS W/ RFLX MICROSCOPIC    Collection Time: 12/18/17 12:50 PM   Result Value Ref Range    Color YELLOW      Appearance CLOUDY      Specific gravity 1.012 1.005 - 1.030      pH (UA) 7.5 5.0 - 8.0      Protein 100 (A) NEG mg/dL    Glucose 500 (A) NEG mg/dL    Ketone NEGATIVE  NEG mg/dL    Bilirubin NEGATIVE  NEG      Blood NEGATIVE  NEG      Urobilinogen 0.2 0.2 - 1.0 EU/dL    Nitrites NEGATIVE  NEG      Leukocyte Esterase NEGATIVE  NEG     URINE MICROSCOPIC ONLY    Collection Time: 12/18/17 12:50 PM   Result Value Ref Range    WBC 0 to 3 0 - 4 /hpf    RBC 0 to 3 0 - 5 /hpf   TYPE & SCREEN    Collection Time: 12/18/17  1:10 PM   Result Value Ref Range    Crossmatch Expiration 12/21/2017     ABO/Rh(D) Corrinne Pont POSITIVE     Antibody screen NEG    POC LACTIC ACID    Collection Time: 12/18/17  1:40 PM   Result Value Ref Range    Lactic Acid (POC) 0.6 0.4 - 2.0 mmol/L       Radiologic Studies -   XR CHEST PORT   Final Result            Medical Decision Making   I am the first provider for this patient. I reviewed the vital signs, available nursing notes, past medical history, past surgical history, family history and social history. Vital Signs-Reviewed the patient's vital signs. EKG: Interpreted by the EP. SR at 96bpm, borderline axis, nonspec st/twave abnorm, no stemi, no old ekg for comparison    Records Reviewed: Nursing Notes (Time of Review: 11:51 AM)    Provider Notes (Medical Decision Making): MDM  Pt with fever, unclear etiology.  S/S concerning for PNA. Multiple other abnormalities including significant anemia. No recent labs for review. Wife states pt with h/o anemia d/t CKD requiring frequent transfusions. States last transfusion several weeks ago. Denies bleeding issues but trace heme positive stool (brown) H/o CKD. Last creatinine 4+ from 2015. Per pt and wife, pt does not wish to be started on HD. Has discussed this with his physicians in the past. Type and and screen sent. Given concern for infection complicating his chronic medical conditions, abx coverage ordered. 30cc/kg bolus held d/t concern for worsening his fluid status and CKD without definitive s/o sepsis. [facility]. However, they have no SDU beds. Family deciding what they would like to do. Family initially wanted to go to [facility] ED. ED has no beds. After multiple discussions, pt and family amenable to admission to [facility]. Pt accepted by Dr. Ivonne Reynolds. Pt resting comfortably. Hemodynamically stable. No significant drop in serial H/H. No SDU beds. Will transition to tele. Dr. Ivonne Reynolds agreeable to this plan. 35mins critical care time devoted to this pt excluding d/w pt/family and procedures. Medications   acetaminophen (TYLENOL) tablet 650 mg (650 mg Oral Given 12/18/17 1203)   pantoprazole (PROTONIX) injection 80 mg (80 mg IntraVENous Given 12/18/17 1407)   sodium chloride 0.9 % bolus infusion 500 mL (500 mL IntraVENous New Bag 12/18/17 1418)   piperacillin-tazobactam (ZOSYN) 2.25 g in 0.9% sodium chloride (MBP/ADV) 50 mL ADV (2.25 g IntraVENous Given 12/18/17 1407)         Diagnosis     Clinical Impression:   1. Acute renal failure, unspecified acute renal failure type ([address]    2. Anemia, unspecified type    3. Hypoxemia    4. Gastrointestinal hemorrhage, unspecified gastrointestinal hemorrhage type    5.  Febrile illness        Disposition: Admit    Follow-up Information     None          Patient's Medications   Start Taking    No medications on file   Continue Taking    AMLODIPINE (NORVASC) 10 MG TABLET    Take  by mouth daily. ASPIRIN DELAYED-RELEASE 81 MG TABLET    Take  by mouth daily. ATORVASTATIN (LIPITOR) 10 MG TABLET    Take  by mouth daily. CALCITRIOL (ROCALTROL) 0.25 MCG CAPSULE    Take 0.25 mcg by mouth daily. CALCIUM ACETATE (PHOSLO) 667 MG CAP    Take  by mouth three (3) times daily (with meals). CALCIUM-VITAMIN D (OYSTER SHELL CALCIUM-VIT D3) 500 MG(1,250MG) -200 UNIT PER TABLET    Take 1 Tab by mouth two (2) times daily (with meals). CARVEDILOL (COREG) 6.25 MG TABLET    Take 12.5 mg by mouth two (2) times daily (with meals). CLONIDINE (CATAPRESS) 0.3 MG TABLET    Take 0.2 mg by mouth two (2) times a day. ERGOCALCIFEROL (ERGOCALCIFEROL) 50,000 UNIT CAPSULE    Take 50,000 Units by mouth. 2 times per week    GLUCOSE 4 GRAM CHEWABLE TABLET    Take 15 g by mouth as needed. 1-5 tabs as needed    HYDRALAZINE (APRESOLINE) 10 MG TABLET    Take 10 mg by mouth. 2 1/2 tabs twice a day    INSULIN GLARGINE (LANTUS) 100 UNIT/ML INJECTION    by SubCUTAneous route once. INSULIN LISPRO (HUMALOG) 100 UNIT/ML INJECTION    by SubCUTAneous route. ISOSORBIDE MONONITRATE ER (IMDUR) 30 MG TABLET    Take  by mouth daily. LEVOBUNOLOL (BETAGAN) 0.5 % OPHTHALMIC SOLUTION    Administer 1 Drop to both eyes nightly. OMEPRAZOLE (PRILOSEC) 20 MG CAPSULE    Take 20 mg by mouth daily. OXYCODONE-ACETAMINOPHEN (PERCOCET 10)  MG PER TABLET    Take 1 Tab by mouth. SODIUM BICARBONATE 650 MG TABLET    Take  by mouth two (2) times a day. 2 tabs    TIMOLOL (TIMOPTIC) 0.5 % OPHTHALMIC SOLUTION    1 Drop two (2) times a day. TORSEMIDE (DEMADEX) 20 MG TABLET    Take  by mouth daily.  2 tabs every other day   These Medications have changed    No medications on file   Stop Taking    No medications on file       _______________________________    Attestations:  [address] [place] [address] Avenue acting as a scribe for and in the presence of Farheen Valverde, DO      December 18, 2017 at 3:33 PM       Provider Attestation:      I personally performed the services described in the documentation, reviewed the documentation, as recorded by the scribe in my presence, and it accurately and completely records my words and actions.  December 18, 2017 at 3:33 PM - Won Ferro, DO    _______________________________

## 2017-12-18 NOTE — IP AVS SNAPSHOT
Summary of Care Report The Summary of Care report has been created to help improve care coordination. Users with access to "[employer]" or [address] (Web-based application) may access additional patient information including the Discharge Summary. If you are not currently a [address] user and need more information, please call the number listed below in the [place] [address] section and ask to be connected with Medical Records. Facility Information Name Address Phone [address] [address] [phone] Patient Information Patient Name Sex  Raoul Martin ([id number]) Male 1935 Discharge Information Admitting Provider Service Area Unit Kimber Wu MD / [address] Telemetry / [phone] Discharge Provider Discharge Date/Time Discharge Disposition Destination (none) 2017 (Pending) AHR (none) Patient Language Language ENGLISH [13] Hospital Problems as of 2017  Reviewed: 2017 12:00 PM by Gentry Zhang MD  
  
  
  
 Class Noted - Resolved Last Modified POA Active Problems Renal failure  2017 - Present 2017 by Ale Fleming, DO Unknown Entered by Ale Fleming DO Anemia  2017 - Present 2017 by Ale Fleming, DO Unknown Entered by Ale Fleming,  Non-Hospital Problems as of 2017  Reviewed: 2017 12:00 PM by Gentry Zhang MD  
  
  
  
 Class Noted - Resolved Last Modified Active Problems Essential hypertension, benign  Unknown - Present 2013 by Mariza Hernandez Entered by Mariza Hernandez Overview Signed 2013 11:45 AM by Mariza Hernandez On Hydralazine, Labetalol, clonidine and norvasc HX of ARF and hyperkalemia with ARB; stable Hyperlipidemia  Unknown - Present 11/10/2015 by Roman Baires MD  
  Entered by Monisha Gunter Overview Signed 11/26/2013 11:45 AM by Monisha Gunter On Zocor and Niaspan Peripheral vascular disease ([facility] [address]  Unknown - Present 11/10/2015 by Roman Baires MD  
  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  
  Entered by Roman Baires MD  
  Overview Signed 3/17/2015 12:12 PM by Roman Baires MD  
   stable 
limited activity 
recent [id number] [place] Coronary atherosclerosis of native coronary artery  3/17/2015 - Present 3/17/2015 by Roman Baires MD  
  Entered by Roman Baires MD  
  Overview Signed 3/17/2015 12:12 PM by Roman Baires MD  
   abnormal stress test 
medically managed 
no angina Cardiomyopathy ([facility] [address]  3/17/2015 - Present 11/10/2015 by Roman Baires MD  
  Entered by Roman Baires MD  
  Overview Signed 3/17/2015 12:12 PM by Roman Baires MD  
   ef 45% Chronic kidney disease, unspecified  3/17/2015 - Present 3/17/2015 by Roman Baires MD  
  Entered by Roman Baires MD  
  Overview Signed 3/17/2015 12:13 PM by Roman Baires MD  
   not on acei/arb You are allergic to the following Allergen Reactions Vancomycin Vertigo Current Discharge Medication List  
  
START taking these medications Dose & Instructions Dispensing Information Comments  
 azithromycin 250 mg tablet Commonly known as:  Rochdale Ruffini Dose:  250 mg Take 1 Tab by mouth daily for 5 days. Quantity:  5 Tab Refills:  0  
   
 fluticasone 50 mcg/actuation nasal spray Commonly known as:  Toya New Dose:  2 Spray 2 Sprays by Both Nostrils route daily. Quantity:  1 Bottle Refills:  1 CONTINUE these medications which have NOT CHANGED Dose & Instructions Dispensing Information Comments  
 amLODIPine 10 mg tablet Commonly known as:  Elio Rickyemelia Take  by mouth daily. Refills:  0  
   
 aspirin delayed-release 81 mg tablet Take  by mouth daily. Refills:  0  
   
 calcitRIOL 0.25 mcg capsule Commonly known as:  ROCALTROL Dose:  0.25 mcg Take 0.25 mcg by mouth daily. Refills:  0  
   
 calcium acetate 667 mg Cap Commonly known as:  PHOSLO Take  by mouth three (3) times daily (with meals). Refills:  0  
   
 carvedilol 6.25 mg tablet Commonly known as:  Trisha Sham Dose:  12.5 mg Take 12.5 mg by mouth two (2) times daily (with meals). Refills:  0  
   
 cloNIDine HCl 0.3 mg tablet Commonly known as:  CATAPRES Dose:  0.2 mg Take 0.2 mg by mouth two (2) times a day. Refills:  0  
   
 ergocalciferol 50,000 unit capsule Commonly known as:  ERGOCALCIFEROL Dose:  35814 Units Take 50,000 Units by mouth. 2 times per week Refills:  0  
   
 glucose 4 gram chewable tablet Dose:  15 g Take 15 g by mouth as needed. 1-5 tabs as needed Refills:  0 HumaLOG 100 unit/mL injection Generic drug:  insulin lispro  
 by SubCUTAneous route. Refills:  0  
   
 hydrALAZINE 10 mg tablet Commonly known as:  APRESOLINE Dose:  10 mg Take 10 mg by mouth. 2 1/2 tabs twice a day Refills:  0  
   
 isosorbide mononitrate ER 30 mg tablet Commonly known as:  IMDUR Take  by mouth daily. Refills:  0  
   
 LANTUS 100 unit/mL injection Generic drug:  insulin glargine  
 by SubCUTAneous route once. Refills:  0  
   
 levobunolol 0.5 % ophthalmic solution Commonly known as:  Maybelle Maurisio Dose:  1 Drop Administer 1 Drop to both eyes nightly. Refills:  0 LIPITOR 10 mg tablet Generic drug:  atorvastatin Take  by mouth daily. Refills:  0  
   
 oxyCODONE-acetaminophen  mg per tablet Commonly known as:  PERCOCET 10 Dose:  1 Tab Take 1 Tab by mouth. Refills:  0 sodium bicarbonate 650 mg tablet Take  by mouth two (2) times a day. 2 tabs Refills:  0  
   
 timolol 0.5 % ophthalmic solution Commonly known as:  TIMOPTIC Dose:  1 Drop 1 Drop two (2) times a day. Refills:  0 STOP taking these medications Comments OYSTER SHELL CALCIUM-VIT D3 500 mg(1,250mg) -200 unit per tablet Generic drug:  calcium-vitamin D  
   
   
 torsemide 20 mg tablet Commonly known as:  DEMADEX Follow-up Information Follow up With Details Comments Contact Info Lord Cranker, MD   [address] [place] [address] 
[phone] Anjelica Giang MD   [address] [facility] 
[phone] Discharge Instructions Pneumonia: Care Instructions Your Care Instructions Pneumonia is an infection of the lungs. Most cases are caused by infections from bacteria or viruses. Pneumonia may be mild or very severe. If it is caused by bacteria, you will be treated with antibiotics. It may take a few weeks to a few months to recover fully from pneumonia, depending on how sick you were and whether your overall health is good. Follow-up care is a key part of your treatment and safety. Be sure to make and go to all appointments, and call your doctor if you are having problems. It's also a good idea to know your test results and keep a list of the medicines you take. How can you care for yourself at home? · Take your antibiotics exactly as directed. Do not stop taking the medicine just because you are feeling better. You need to take the full course of antibiotics. · Take your medicines exactly as prescribed. Call your doctor if you think you are having a problem with your medicine. · Get plenty of rest and sleep. You may feel weak and tired for a while, but your energy level will improve with time.  
· To prevent dehydration, drink plenty of fluids, enough so that your urine is light yellow or clear like water. Choose water and other caffeine-free clear liquids until you feel better. If you have kidney, heart, or liver disease and have to limit fluids, talk with your doctor before you increase the amount of fluids you drink. · Take care of your cough so you can rest. A cough that brings up mucus from your lungs is common with pneumonia. It is one way your body gets rid of the infection. But if coughing keeps you from resting or causes severe fatigue and chest-wall pain, talk to your doctor. He or she may suggest that you take a medicine to reduce the cough. · Use a vaporizer or humidifier to add moisture to your bedroom. Follow the directions for cleaning the machine. · Do not smoke or allow others to smoke around you. Smoke will make your cough last longer. If you need help quitting, talk to your doctor about stop-smoking programs and medicines. These can increase your chances of quitting for good. · Take an over-the-counter pain medicine, such as acetaminophen (Tylenol), ibuprofen (Advil, Motrin), or naproxen (Aleve). Read and follow all instructions on the label. · Do not take two or more pain medicines at the same time unless the doctor told you to. Many pain medicines have acetaminophen, which is Tylenol. Too much acetaminophen (Tylenol) can be harmful. · If you were given a spirometer to measure how well your lungs are working, use it as instructed. This can help your doctor tell how your recovery is going. · To prevent pneumonia in the future, talk to your doctor about getting a flu vaccine (once a year) and a pneumococcal vaccine (one time only for most people). When should you call for help? Call 911 anytime you think you may need emergency care. For example, call if: 
? · You have severe trouble breathing. ?Call your doctor now or seek immediate medical care if: 
? · You cough up dark brown or bloody mucus (sputum). ? · You have new or worse trouble breathing. ? · You are dizzy or lightheaded, or you feel like you may faint. ? Watch closely for changes in your health, and be sure to contact your doctor if: 
? · You have a new or higher fever. ? · You are coughing more deeply or more often. ? · You are not getting better after 2 days (48 hours). ? · You do not get better as expected. Where can you learn more? Go to http://aletha-marino.info/. Enter [id number] in the search box to learn more about \"Pneumonia: Care Instructions. \" Current as of: May 12, 2017 Content Version: 11.4 © 3846-8356 [employer]. Care instructions adapted under license by [employer] (which disclaims liability or warranty for this information). If you have questions about a medical condition or this instruction, always ask your healthcare professional. [place] [address] any warranty or liability for your use of this information. Acute Kidney Injury: Care Instructions Your Care Instructions Acute kidney injury (NALINI) is a sudden decrease in kidney function. This can happen over a period of hours, days or, in some cases, weeks. NALINI used to be called acute renal failure, but kidney failure doesn't always happen with NALINI. Common causes of NALINI are dehydration, blood loss, and medicines. When NALINI happens, the kidneys have trouble removing waste and excess fluids from the body. The waste and fluids build up and become harmful. Kidney function may return to normal if the cause of NALINI is treated quickly. Your chance of a full recovery depends on what caused the problem, how quickly the cause was treated, and what other medical problems you have. A machine may be used to help your kidneys remove waste and fluids for a short period of time. This is called dialysis. Follow-up care is a key part of your treatment and safety.  Be sure to make and go to all appointments, and call your doctor if you are having problems. It's also a good idea to know your test results and keep a list of the medicines you take. How can you care for yourself at home? · Talk to your doctor about how much fluid you should drink. · Eat a balanced diet. Talk to your doctor or a dietitian about what type of diet may be best for you. You may need to limit sodium, potassium, and phosphorus. · If you need dialysis, follow the instructions and schedule for dialysis that your doctor gives you. · Do not smoke. Smoking can make your condition worse. If you need help quitting, talk to your doctor about stop-smoking programs and medicines. These can increase your chances of quitting for good. · Do not drink alcohol. · Review all of your medicines with your doctor. Do not take any medicines, including nonsteroidal anti-inflammatory drugs (NSAIDs) such as ibuprofen (Advil, Motrin) or naproxen (Aleve), unless your doctor says it is safe for you to do so. · Make sure that anyone treating you for any health problem knows that you have had NALINI. When should you call for help? Call 911 anytime you think you may need emergency care. For example, call if: 
? · You passed out (lost consciousness). ?Call your doctor now or seek immediate medical care if: 
? · You have new or worse nausea and vomiting. ? · You have much less urine than normal, or you have no urine. ? · You are feeling confused or cannot think clearly. ? · You have new or more blood in your urine. ? · You have new swelling. ? · You are dizzy or lightheaded, or feel like you may faint. ? Watch closely for changes in your health, and be sure to contact your doctor if: 
? · You do not get better as expected. Where can you learn more? Go to http://aletha-marino.info/. Enter O192 in the search box to learn more about \"Acute Kidney Injury: Care Instructions. \" Current as of: May 12, 2017 Content Version: 11.4 © 8321-8543 [employer]. Care instructions adapted under license by [employer] (which disclaims liability or warranty for this information). If you have questions about a medical condition or this instruction, always ask your healthcare professional. [address] any warranty or liability for your use of this information. Anemia: Care Instructions Your Care Instructions Anemia is a low level of red blood cells, which carry oxygen throughout your body. Many things can cause anemia. Lack of iron is one of the most common causes. Your body needs iron to make hemoglobin, a substance in red blood cells that carries oxygen from the lungs to your body's cells. Without enough iron, the body produces fewer and smaller red blood cells. As a result, your body's cells do not get enough oxygen, and you feel tired and weak. And you may have trouble concentrating. Bleeding is the most common cause of a lack of iron. You may have heavy menstrual bleeding or bleeding caused by conditions such as ulcers, hemorrhoids, or cancer. Regular use of aspirin or other anti-inflammatory medicines (such as ibuprofen) also can cause bleeding in some people. A lack of iron in your diet also can cause anemia, especially at times when the body needs more iron, such as during pregnancy, infancy, and the teen years. Your doctor may have prescribed iron pills. It may take several months of treatment for your iron levels to return to normal. Your doctor also may suggest that you eat foods that are rich in iron, such as meat and beans. There are many other causes of anemia. It is not always due to a lack of iron. Finding the specific cause of your anemia will help your doctor find the right treatment for you. Follow-up care is a key part of your treatment and safety.  Be sure to make and go to all appointments, and call your doctor if you are having problems. It's also a good idea to know your test results and keep a list of the medicines you take. How can you care for yourself at home? · Take your medicines exactly as prescribed. Call your doctor if you think you are having a problem with your medicine. · If your doctor recommends iron pills, take them as directed: ¨ Try to take the pills on an empty stomach about 1 hour before or 2 hours after meals. But you may need to take iron with food to avoid an upset stomach. ¨ Do not take antacids or drink milk or caffeine drinks (such as coffee, tea, or cola) at the same time or within 2 hours of the time that you take your iron. They can make it hard for your body to absorb the iron. ¨ Vitamin C (from food or supplements) helps your body absorb iron. Try taking iron pills with a glass of orange juice or some other food that is high in vitamin C, such as citrus fruits. ¨ Iron pills may cause stomach problems, such as heartburn, nausea, diarrhea, constipation, and cramps. Be sure to drink plenty of fluids, and include fruits, vegetables, and fiber in your diet each day. Iron pills often make your bowel movements dark or green. ¨ If you forget to take an iron pill, do not take a double dose of iron the next time you take a pill. ¨ Keep iron pills out of the reach of small children. An overdose of iron can be very dangerous. · Follow your doctor's advice about eating iron-rich foods. These include red meat, shellfish, poultry, eggs, beans, raisins, whole-grain bread, and leafy green vegetables. · Steam vegetables to help them keep their iron content. When should you call for help? Call 911 anytime you think you may need emergency care. For example, call if: 
? · You have symptoms of a heart attack. These may include: ¨ Chest pain or pressure, or a strange feeling in the chest. 
¨ Sweating. ¨ Shortness of breath. ¨ Nausea or vomiting. ¨ Pain, pressure, or a strange feeling in the back, neck, jaw, or upper belly or in one or both shoulders or arms. ¨ Lightheadedness or sudden weakness. ¨ A fast or irregular heartbeat. After you call 911, the  may tell you to chew 1 adult-strength or 2 to 4 low-dose aspirin. Wait for an ambulance. Do not try to drive yourself. ? · You passed out (lost consciousness). ?Call your doctor now or seek immediate medical care if: 
? · You have new or increased shortness of breath. ? · You are dizzy or lightheaded, or you feel like you may faint. ? · Your fatigue and weakness continue or get worse. ? · You have any abnormal bleeding, such as: 
¨ Nosebleeds. ¨ Vaginal bleeding that is different (heavier, more frequent, at a different time of the month) than what you are used to. ¨ Bloody or black stools, or rectal bleeding. ¨ Bloody or pink urine. ? Watch closely for changes in your health, and be sure to contact your doctor if: 
? · You do not get better as expected. Where can you learn more? Go to http://aletha-marino.info/. Enter R301 in the search box to learn more about \"Anemia: Care Instructions. \" Current as of: October 13, 2016 Content Version: 11.4 © 6366-1676 [employer]. Care instructions adapted under license by [employer] (which disclaims liability or warranty for this information). If you have questions about a medical condition or this instruction, always ask your healthcare professional. [place] [address] any warranty or liability for your use of this information. Patient armband removed and shredded DISCHARGE SUMMARY from Nurse PATIENT INSTRUCTIONS: 
 
 
F-face looks uneven A-arms unable to move or move unevenly S-speech slurred or non-existent T-time-call 911 as soon as signs and symptoms begin-DO NOT go Back to bed or wait to see if you get better-TIME IS BRAIN. Warning Signs of HEART ATTACK Call 911 if you have these symptoms: 
? Chest discomfort. Most heart attacks involve discomfort in the center of the chest that lasts more than a few minutes, or that goes away and comes back. It can feel like uncomfortable pressure, squeezing, fullness, or pain. ? Discomfort in other areas of the upper body. Symptoms can include pain or discomfort in one or both arms, the back, neck, jaw, or stomach. ? Shortness of breath with or without chest discomfort. ? Other signs may include breaking out in a cold sweat, nausea, or lightheadedness. Don't wait more than five minutes to call [address]! Fast action can save your life. Calling 911 is almost always the fastest way to get lifesaving treatment. Emergency Medical Services staff can begin treatment when they arrive  up to an hour sooner than if someone gets to the hospital by car. The discharge information has been reviewed with the patient. The patient verbalized understanding. Discharge medications reviewed with the patient and appropriate educational materials and side effects teaching were provided. ___________________________________________________________________________________________________________________________________ Chart Review Routing History Recipient Method Report Sent By Reinaldo Brito MD  
Fax: [phone] Phone: [phone] Fax Provider Comm Report marita Giraldoemelia Masterson 2/28/2012  1:24 PM 02/27/2012 Uli Brito MD  
Fax: [phone] Phone: [phone] Fax Provider Comm Report Bj Masterson 4/13/2012  2:43 PM 04/13/2012

## 2017-12-18 NOTE — IP AVS SNAPSHOT
Rick Corona 
 
 
 [address] [place] [address] Reade Yue Patient: Chinedu Sandoval MRN: [id number] [facility]:[id number] My Medications STOP taking these medications OYSTER SHELL CALCIUM-VIT D3 500 mg(1,250mg) -200 unit per tablet Generic drug:  calcium-vitamin D  
   
  
 torsemide 20 mg tablet Commonly known as:  DEMADEX TAKE these medications as instructed Instructions Each Dose to Equal  
 Morning Noon Evening Bedtime  
 amLODIPine 10 mg tablet Commonly known as:  Yaa Maynardozemelia Your last dose was: Your next dose is: Take  by mouth daily. aspirin delayed-release 81 mg tablet Your last dose was: Your next dose is: Take  by mouth daily. azithromycin 250 mg tablet Commonly known as:  Bryant Flaming Your last dose was: Your next dose is: Take 1 Tab by mouth daily for 5 days. 250 mg  
    
   
   
   
  
 calcitRIOL 0.25 mcg capsule Commonly known as:  ROCALTROL Your last dose was: Your next dose is: Take 0.25 mcg by mouth daily. 0.25 mcg  
    
   
   
   
  
 calcium acetate 667 mg Cap Commonly known as:  PHOSLO Your last dose was: Your next dose is: Take  by mouth three (3) times daily (with meals). carvedilol 6.25 mg tablet Commonly known as:  Jany Galarza Your last dose was: Your next dose is: Take 12.5 mg by mouth two (2) times daily (with meals). 12.5 mg  
    
   
   
   
  
 cloNIDine HCl 0.3 mg tablet Commonly known as:  CATAPRES Your last dose was: Your next dose is: Take 0.2 mg by mouth two (2) times a day. 0.2 mg  
    
   
   
   
  
 ergocalciferol 50,000 unit capsule Commonly known as:  ERGOCALCIFEROL Your last dose was: Your next dose is: Take 50,000 Units by mouth. 2 times per week 78569 Units  
    
   
   
   
  
 fluticasone 50 mcg/actuation nasal spray Commonly known as:  Yunier Tucker Your last dose was: Your next dose is: 2 Sprays by Both Nostrils route daily. 2 Spray  
    
   
   
   
  
 glucose 4 gram chewable tablet Your last dose was: Your next dose is: Take 15 g by mouth as needed. 1-5 tabs as needed 15 g HumaLOG 100 unit/mL injection Generic drug:  insulin lispro Your last dose was: Your next dose is:    
   
   
 by SubCUTAneous route. hydrALAZINE 10 mg tablet Commonly known as:  APRESOLINE Your last dose was: Your next dose is: Take 10 mg by mouth. 2 1/2 tabs twice a day 10 mg  
    
   
   
   
  
 isosorbide mononitrate ER 30 mg tablet Commonly known as:  IMDUR Your last dose was: Your next dose is: Take  by mouth daily. LANTUS 100 unit/mL injection Generic drug:  insulin glargine Your last dose was: Your next dose is:    
   
   
 by SubCUTAneous route once. levobunolol 0.5 % ophthalmic solution Commonly known as:  Agus Mccarthy Your last dose was: Your next dose is:    
   
   
 Administer 1 Drop to both eyes nightly. 1 Drop LIPITOR 10 mg tablet Generic drug:  atorvastatin Your last dose was: Your next dose is: Take  by mouth daily. oxyCODONE-acetaminophen  mg per tablet Commonly known as:  PERCOCET 10 Your last dose was: Your next dose is: Take 1 Tab by mouth. 1 Tab  
    
   
   
   
  
 sodium bicarbonate 650 mg tablet Your last dose was: Your next dose is: Take  by mouth two (2) times a day. 2 tabs timolol 0.5 % ophthalmic solution Commonly known as:  TIMOPTIC Your last dose was: Your next dose is:    
   
   
 1 Drop two (2) times a day. 1 Drop Where to Get Your Medications Information on where to get these meds will be given to you by the nurse or doctor. ! Ask your nurse or doctor about these medications  
  azithromycin 250 mg tablet  
 fluticasone 50 mcg/actuation nasal spray

## 2017-12-18 NOTE — Clinical Note
Status[de-identified] Inpatient [101] Type of Bed: Stepdown [17] Inpatient Hospitalization Certified Necessary for the Following Reasons: 9. Other (further clarification in H&P documentation) Admitting Diagnosis: Febrile illness [[id number]] Admitting Diagnosis: Anemia [110496] Admitting Diagnosis: Renal failure [[id number]] Admitting Physician: Raegan Cloud [[id number]] Attending Physician: Raegan Cloud [[id number]] Estimated Length of Stay: 2 Midnights Discharge Plan[de-identified] Home with Office Follow-up

## 2017-12-18 NOTE — ED TRIAGE NOTES
Patient states cold symptoms including cough and nasal congestion x 3 days. States recent fever of 101.5 this morning.

## 2017-12-18 NOTE — IP AVS SNAPSHOT
[id number] [place] [address] Patient: Bennie Huynh MRN: [id number] XPL:93/37/1994 About your hospitalization You were admitted on:  December 18, 2017 You last received care in the:  [address] [place] [address] You were discharged on:  December 22, 2017 Why you were hospitalized Your primary diagnosis was:  Not on File Your diagnoses also included:  Renal Failure, Anemia, Febrile Illness, Community Acquired Pneumonia Things You Need To Do (next 8 weeks) Follow up with Kavita Askew MD  
  
Phone:  [phone] Where:  [address], [address] [facility], [address] Follow up with Kimberly Lyon MD  
  
Phone:  [phone] Where:  [address], [address] [facility], [address] Discharge Orders None A check alesia indicates which time of day the medication should be taken. My Medications STOP taking these medications OYSTER SHELL CALCIUM-VIT D3 500 mg(1,250mg) -200 unit per tablet Generic drug:  calcium-vitamin D  
   
  
 torsemide 20 mg tablet Commonly known as:  DEMADEX TAKE these medications as instructed Instructions Each Dose to Equal  
 Morning Noon Evening Bedtime  
 amLODIPine 10 mg tablet Commonly known as:  Lynnell Manual Your last dose was: Your next dose is: Take  by mouth daily. aspirin delayed-release 81 mg tablet Your last dose was: Your next dose is: Take  by mouth daily. azithromycin 250 mg tablet Commonly known as:  Lolita Martinez Your last dose was: Your next dose is: Take 1 Tab by mouth daily for 5 days. 250 mg  
    
   
   
   
  
 calcitRIOL 0.25 mcg capsule Commonly known as:  ROCALTROL Your last dose was: Your next dose is: Take 0.25 mcg by mouth daily. 0.25 mcg calcium acetate 667 mg Cap Commonly known as:  PHOSLO Your last dose was: Your next dose is: Take  by mouth three (3) times daily (with meals). carvedilol 6.25 mg tablet Commonly known as:  Se Dine Your last dose was: Your next dose is: Take 12.5 mg by mouth two (2) times daily (with meals). 12.5 mg  
    
   
   
   
  
 cloNIDine HCl 0.3 mg tablet Commonly known as:  CATAPRES Your last dose was: Your next dose is: Take 0.2 mg by mouth two (2) times a day. 0.2 mg  
    
   
   
   
  
 ergocalciferol 50,000 unit capsule Commonly known as:  ERGOCALCIFEROL Your last dose was: Your next dose is: Take 50,000 Units by mouth. 2 times per week 40992 Units  
    
   
   
   
  
 fluticasone 50 mcg/actuation nasal spray Commonly known as:  Delmon Pickup Your last dose was: Your next dose is: 2 Sprays by Both Nostrils route daily. 2 Spray  
    
   
   
   
  
 glucose 4 gram chewable tablet Your last dose was: Your next dose is: Take 15 g by mouth as needed. 1-5 tabs as needed 15 g HumaLOG 100 unit/mL injection Generic drug:  insulin lispro Your last dose was: Your next dose is:    
   
   
 by SubCUTAneous route. hydrALAZINE 10 mg tablet Commonly known as:  APRESOLINE Your last dose was: Your next dose is: Take 10 mg by mouth. 2 1/2 tabs twice a day 10 mg  
    
   
   
   
  
 isosorbide mononitrate ER 30 mg tablet Commonly known as:  IMDUR Your last dose was: Your next dose is: Take  by mouth daily. LANTUS 100 unit/mL injection Generic drug:  insulin glargine Your last dose was: Your next dose is:    
   
   
 by SubCUTAneous route once. levobunolol 0.5 % ophthalmic solution Commonly known as:  Cecy Traci Your last dose was: Your next dose is:    
   
   
 Administer 1 Drop to both eyes nightly. 1 Drop LIPITOR 10 mg tablet Generic drug:  atorvastatin Your last dose was: Your next dose is: Take  by mouth daily. oxyCODONE-acetaminophen  mg per tablet Commonly known as:  PERCOCET 10 Your last dose was: Your next dose is: Take 1 Tab by mouth. 1 Tab  
    
   
   
   
  
 sodium bicarbonate 650 mg tablet Your last dose was: Your next dose is: Take  by mouth two (2) times a day. 2 tabs  
     
   
   
   
  
 timolol 0.5 % ophthalmic solution Commonly known as:  TIMOPTIC Your last dose was: Your next dose is:    
   
   
 1 Drop two (2) times a day. 1 Drop Where to Get Your Medications Information on where to get these meds will be given to you by the nurse or doctor. ! Ask your nurse or doctor about these medications  
  azithromycin 250 mg tablet  
 fluticasone 50 mcg/actuation nasal spray Discharge Instructions Pneumonia: Care Instructions Your Care Instructions Pneumonia is an infection of the lungs. Most cases are caused by infections from bacteria or viruses. Pneumonia may be mild or very severe. If it is caused by bacteria, you will be treated with antibiotics. It may take a few weeks to a few months to recover fully from pneumonia, depending on how sick you were and whether your overall health is good. Follow-up care is a key part of your treatment and safety. Be sure to make and go to all appointments, and call your doctor if you are having problems. It's also a good idea to know your test results and keep a list of the medicines you take. How can you care for yourself at home? · Take your antibiotics exactly as directed. Do not stop taking the medicine just because you are feeling better. You need to take the full course of antibiotics. · Take your medicines exactly as prescribed. Call your doctor if you think you are having a problem with your medicine. · Get plenty of rest and sleep. You may feel weak and tired for a while, but your energy level will improve with time. · To prevent dehydration, drink plenty of fluids, enough so that your urine is light yellow or clear like water. Choose water and other caffeine-free clear liquids until you feel better. If you have kidney, heart, or liver disease and have to limit fluids, talk with your doctor before you increase the amount of fluids you drink. · Take care of your cough so you can rest. A cough that brings up mucus from your lungs is common with pneumonia. It is one way your body gets rid of the infection. But if coughing keeps you from resting or causes severe fatigue and chest-wall pain, talk to your doctor. He or she may suggest that you take a medicine to reduce the cough. · Use a vaporizer or humidifier to add moisture to your bedroom. Follow the directions for cleaning the machine. · Do not smoke or allow others to smoke around you. Smoke will make your cough last longer. If you need help quitting, talk to your doctor about stop-smoking programs and medicines. These can increase your chances of quitting for good. · Take an over-the-counter pain medicine, such as acetaminophen (Tylenol), ibuprofen (Advil, Motrin), or naproxen (Aleve). Read and follow all instructions on the label. · Do not take two or more pain medicines at the same time unless the doctor told you to. Many pain medicines have acetaminophen, which is Tylenol. Too much acetaminophen (Tylenol) can be harmful. · If you were given a spirometer to measure how well your lungs are working, use it as instructed.  This can help your doctor tell how your recovery is going. · To prevent pneumonia in the future, talk to your doctor about getting a flu vaccine (once a year) and a pneumococcal vaccine (one time only for most people). When should you call for help? Call 911 anytime you think you may need emergency care. For example, call if: 
? · You have severe trouble breathing. ?Call your doctor now or seek immediate medical care if: 
? · You cough up dark brown or bloody mucus (sputum). ? · You have new or worse trouble breathing. ? · You are dizzy or lightheaded, or you feel like you may faint. ? Watch closely for changes in your health, and be sure to contact your doctor if: 
? · You have a new or higher fever. ? · You are coughing more deeply or more often. ? · You are not getting better after 2 days (48 hours). ? · You do not get better as expected. Where can you learn more? Go to http://aletha-marino.info/. Enter [id number] in the search box to learn more about \"Pneumonia: Care Instructions. \" Current as of: May 12, 2017 Content Version: 11.4 © 4597-3516 [employer]kam. Care instructions adapted under license by [employer] (which disclaims liability or warranty for this information). If you have questions about a medical condition or this instruction, always ask your healthcare professional. [place] [address] any warranty or liability for your use of this information. Acute Kidney Injury: Care Instructions Your Care Instructions Acute kidney injury (NALINI) is a sudden decrease in kidney function. This can happen over a period of hours, days or, in some cases, weeks. NALINI used to be called acute renal failure, but kidney failure doesn't always happen with NALINI. Common causes of NALINI are dehydration, blood loss, and medicines. When NALINI happens, the kidneys have trouble removing waste and excess fluids from the body. The waste and fluids build up and become harmful. Kidney function may return to normal if the cause of NALINI is treated quickly. Your chance of a full recovery depends on what caused the problem, how quickly the cause was treated, and what other medical problems you have. A machine may be used to help your kidneys remove waste and fluids for a short period of time. This is called dialysis. Follow-up care is a key part of your treatment and safety. Be sure to make and go to all appointments, and call your doctor if you are having problems. It's also a good idea to know your test results and keep a list of the medicines you take. How can you care for yourself at home? · Talk to your doctor about how much fluid you should drink. · Eat a balanced diet. Talk to your doctor or a dietitian about what type of diet may be best for you. You may need to limit sodium, potassium, and phosphorus. · If you need dialysis, follow the instructions and schedule for dialysis that your doctor gives you. · Do not smoke. Smoking can make your condition worse. If you need help quitting, talk to your doctor about stop-smoking programs and medicines. These can increase your chances of quitting for good. · Do not drink alcohol. · Review all of your medicines with your doctor. Do not take any medicines, including nonsteroidal anti-inflammatory drugs (NSAIDs) such as ibuprofen (Advil, Motrin) or naproxen (Aleve), unless your doctor says it is safe for you to do so. · Make sure that anyone treating you for any health problem knows that you have had NALINI. When should you call for help? Call 911 anytime you think you may need emergency care. For example, call if: 
? · You passed out (lost consciousness). ?Call your doctor now or seek immediate medical care if: 
? · You have new or worse nausea and vomiting. ? · You have much less urine than normal, or you have no urine. ? · You are feeling confused or cannot think clearly. ? · You have new or more blood in your urine. ? · You have new swelling. ? · You are dizzy or lightheaded, or feel like you may faint. ? Watch closely for changes in your health, and be sure to contact your doctor if: 
? · You do not get better as expected. Where can you learn more? Go to http://aletha-marino.info/. Enter V715 in the search box to learn more about \"Acute Kidney Injury: Care Instructions. \" Current as of: May 12, 2017 Content Version: 11.4 © 7415-7582 [employer]. Care instructions adapted under license by [employer] (which disclaims liability or warranty for this information). If you have questions about a medical condition or this instruction, always ask your healthcare professional. [address] any warranty or liability for your use of this information. Anemia: Care Instructions Your Care Instructions Anemia is a low level of red blood cells, which carry oxygen throughout your body. Many things can cause anemia. Lack of iron is one of the most common causes. Your body needs iron to make hemoglobin, a substance in red blood cells that carries oxygen from the lungs to your body's cells. Without enough iron, the body produces fewer and smaller red blood cells. As a result, your body's cells do not get enough oxygen, and you feel tired and weak. And you may have trouble concentrating. Bleeding is the most common cause of a lack of iron. You may have heavy menstrual bleeding or bleeding caused by conditions such as ulcers, hemorrhoids, or cancer. Regular use of aspirin or other anti-inflammatory medicines (such as ibuprofen) also can cause bleeding in some people. A lack of iron in your diet also can cause anemia, especially at times when the body needs more iron, such as during pregnancy, infancy, and the teen years. Your doctor may have prescribed iron pills.  It may take several months of treatment for your iron levels to return to normal. Your doctor also may suggest that you eat foods that are rich in iron, such as meat and beans. There are many other causes of anemia. It is not always due to a lack of iron. Finding the specific cause of your anemia will help your doctor find the right treatment for you. Follow-up care is a key part of your treatment and safety. Be sure to make and go to all appointments, and call your doctor if you are having problems. It's also a good idea to know your test results and keep a list of the medicines you take. How can you care for yourself at home? · Take your medicines exactly as prescribed. Call your doctor if you think you are having a problem with your medicine. · If your doctor recommends iron pills, take them as directed: ¨ Try to take the pills on an empty stomach about 1 hour before or 2 hours after meals. But you may need to take iron with food to avoid an upset stomach. ¨ Do not take antacids or drink milk or caffeine drinks (such as coffee, tea, or cola) at the same time or within 2 hours of the time that you take your iron. They can make it hard for your body to absorb the iron. ¨ Vitamin C (from food or supplements) helps your body absorb iron. Try taking iron pills with a glass of orange juice or some other food that is high in vitamin C, such as citrus fruits. ¨ Iron pills may cause stomach problems, such as heartburn, nausea, diarrhea, constipation, and cramps. Be sure to drink plenty of fluids, and include fruits, vegetables, and fiber in your diet each day. Iron pills often make your bowel movements dark or green. ¨ If you forget to take an iron pill, do not take a double dose of iron the next time you take a pill. ¨ Keep iron pills out of the reach of small children. An overdose of iron can be very dangerous. · Follow your doctor's advice about eating iron-rich foods.  These include red meat, shellfish, poultry, eggs, beans, raisins, whole-grain bread, and leafy green vegetables. · Steam vegetables to help them keep their iron content. When should you call for help? Call 911 anytime you think you may need emergency care. For example, call if: 
? · You have symptoms of a heart attack. These may include: ¨ Chest pain or pressure, or a strange feeling in the chest. 
¨ Sweating. ¨ Shortness of breath. ¨ Nausea or vomiting. ¨ Pain, pressure, or a strange feeling in the back, neck, jaw, or upper belly or in one or both shoulders or arms. ¨ Lightheadedness or sudden weakness. ¨ A fast or irregular heartbeat. After you call 911, the  may tell you to chew 1 adult-strength or 2 to 4 low-dose aspirin. Wait for an ambulance. Do not try to drive yourself. ? · You passed out (lost consciousness). ?Call your doctor now or seek immediate medical care if: 
? · You have new or increased shortness of breath. ? · You are dizzy or lightheaded, or you feel like you may faint. ? · Your fatigue and weakness continue or get worse. ? · You have any abnormal bleeding, such as: 
¨ Nosebleeds. ¨ Vaginal bleeding that is different (heavier, more frequent, at a different time of the month) than what you are used to. ¨ Bloody or black stools, or rectal bleeding. ¨ Bloody or pink urine. ? Watch closely for changes in your health, and be sure to contact your doctor if: 
? · You do not get better as expected. Where can you learn more? Go to http://aletha-marino.info/. Enter R301 in the search box to learn more about \"Anemia: Care Instructions. \" Current as of: October 13, 2016 Content Version: 11.4 © 2635-8969 EzFlop [employer] First [employer] Its Kind Flip Flop. Care instructions adapted under license by [employer] (which disclaims liability or warranty for this information).  If you have questions about a medical condition or this instruction, always ask your healthcare professional. [place] [address] any warranty or liability for your use of this information. Patient armband removed and shredded DISCHARGE SUMMARY from Nurse PATIENT INSTRUCTIONS: 
 
 
F-face looks uneven A-arms unable to move or move unevenly S-speech slurred or non-existent T-time-call 911 as soon as signs and symptoms begin-DO NOT go Back to bed or wait to see if you get better-TIME IS BRAIN. Warning Signs of HEART ATTACK Call 911 if you have these symptoms: 
? Chest discomfort. Most heart attacks involve discomfort in the center of the chest that lasts more than a few minutes, or that goes away and comes back. It can feel like uncomfortable pressure, squeezing, fullness, or pain. ? Discomfort in other areas of the upper body. Symptoms can include pain or discomfort in one or both arms, the back, neck, jaw, or stomach. ? Shortness of breath with or without chest discomfort. ? Other signs may include breaking out in a cold sweat, nausea, or lightheadedness. Don't wait more than five minutes to call [address]! Fast action can save your life. Calling 911 is almost always the fastest way to get lifesaving treatment. Emergency Medical Services staff can begin treatment when they arrive  up to an hour sooner than if someone gets to the hospital by car. The discharge information has been reviewed with the patient. The patient verbalized understanding. Discharge medications reviewed with the patient and appropriate educational materials and side effects teaching were provided. ___________________________________________________________________________________________________________________________________ MyChart Announcement We are excited to announce that we are making your provider's discharge notes available to you in [email or web address]. You will see these notes when they are completed and signed by the physician that discharged you from your recent hospital stay. If you have any questions or concerns about any information you see in [employer] Funding[employer], please call the Health Information Department where you were seen or reach out to your Primary Care Provider for more information about your plan of care. Introducing [facility]! Ishmael Grant introduces [email or web address] patient portal. Now you can access parts of your medical record, email your doctor's office, and request medication refills online. 1. In your internet browser, go to [email or web address] 2. Click on the First Time User? Click Here link in the Sign In box. You will see the New Member Sign Up page. 3. Enter your [email or web address] Access Code exactly as it appears below. You will not need to use this code after youve completed the sign-up process. If you do not sign up before the expiration date, you must request a new code. · [email or web address] Access Code: [email or web address] Expires: 12/24/2017 10:20 AM 
 
4. Enter the last four digits of your Social Security Number (xxxx) and Date of Birth (mm/dd/yyyy) as indicated and click Submit. You will be taken to the next sign-up page. 5. Create a Unity Technologiest ID. This will be your [email or web address] login ID and cannot be changed, so think of one that is secure and easy to remember. 6. Create a Unity [employer] password. You can change your password at any time. 7. Enter your Password Reset Question and Answer. This can be used at a later time if you forget your password. 8. Enter your e-mail address. You will receive e-mail notification when new information is available in [address] 9. Click Sign Up. You can now view and download portions of your medical record. 10. Click the Download Summary menu link to download a portable copy of your medical information. If you have questions, please visit the Frequently Asked Questions section of the MyChart website. Remember, [employer] is NOT to be used for urgent needs. For medical emergencies, dial 911. Now available from your iPhone and Android! Unresulted Labs-Please follow up with your PCP about these lab tests Order Current Status CULTURE, BLOOD Preliminary result CULTURE, BLOOD Preliminary result Providers Seen During Your Hospitalization Provider Specialty Primary office phone Shantal Gooden DO Emergency Medicine [phone] Michelle Johansen MD Internal Medicine [phone] Your Primary Care Physician (PCP) Primary Care Physician Office Phone Office Fax Marquis Mcgraw [phone] You are allergic to the following Allergen Reactions Vancomycin Vertigo Recent Documentation Height Weight BMI Smoking Status 1.829 m 81.2 kg 24.29 kg/m2 Former Smoker Emergency Contacts Name Discharge Info Relation Home Work Mobile Margot Emerson DISCHARGE CAREGIVER [3] Spouse [3] [phone] Patient Belongings The following personal items are in your possession at time of discharge: 
     Visual Aid: Glasses                  Other Valuables: At bedside, [facility] Discharge Instructions Attachments/References AZITHROMYCIN (BY MOUTH) (ENGLISH) FLUTICASONE (INTO THE NOSE) (ENGLISH) Patient Handouts Azithromycin (By mouth) Azithromycin ([id number]) Treats infections. This medicine is a macrolide antibiotic. Brand Name(s): Zithromax, Zithromax Tri-Alvaro, Zithromax Z-Alvaro, Zmax There may be other brand names for this medicine. When This Medicine Should Not Be Used: This medicine is not right for everyone. Do not use it if you had an allergic reaction to azithromycin, erythromycin, or similar medicines, or you have a history of liver problems caused by azithromycin. How to Use This Medicine:  
Capsule, Liquid, Packet, Powder, Tablet · Your doctor will tell you how much medicine to use. Do not use more than directed. · Take all of the medicine in your prescription to clear up your infection, even if you feel better after the first few doses. · Read and follow the patient instructions that come with this medicine. Talk to your doctor or pharmacist if you have any questions. · Multiple dose (Zithromax® oral liquid or tablets): ¨ You may take this medicine with or without food. ¨ Shake the bottle well before you measure the medicine. Measure the oral liquid medicine with a marked measuring spoon, oral syringe, or medicine cup. · Single dose (Zmax® extended-release oral liquid or powder):  
¨ Liquid: § Take this medicine on an empty stomach at least 1 hour before you eat, or 2 hours after you eat. § Call your doctor right away if you vomit within 1 hour after you take the medicine. § You must take the liquid within 12 hours after the pharmacist gives it to you. § Shake the bottle well before you measure the medicine. Measure your dose with a marked measuring spoon, cup, or syringe. ¨ Powder:  
§ Open 1 packet and pour all of the medicine into a glass with about 2 ounces (¼ cup) of water. Mix well and drink the medicine right away. Pour another 2 ounces of water into the same glass, and drink the remaining medicine. · Missed dose: If you are taking multiple doses, take the dose as soon as you remember. If it is almost time for your next dose, wait until then to take a regular dose. Do not use extra medicine to make up for a missed dose. · Store the medicine in a closed container at room temperature, away from heat, moisture, and direct light. · Extended-release oral liquid: Do not refrigerate or freeze. · Oral liquid for 1 dose only: Store at room temperature. Do not store in the refrigerator or allow the medicine to freeze. · Oral liquid for multiple doses: Store at room temperature or in the refrigerator. Use it within 10 days of filling the prescription. Drugs and Foods to Avoid: Ask your doctor or pharmacist before using any other medicine, including over-the-counter medicines, vitamins, and herbal products. · Some medicines can affect how azithromycin works. Tell your doctor if you are also using any of the following: ¨ Cyclosporine, digoxin, nelfinavir, or phenytoin ¨ Blood thinner ¨ Ergot medicine ¨ Medicine for a heart rhythm problem (including amiodarone, dofetilide, procainamide, quinidine, sotalol) · Zithromax® for multiple doses: Do not take an antacid that contains magnesium or aluminum at the same time you take Zithromax®. An antacid will affect how the medicine works. Antacids will not affect Zmax® for single dose. Warnings While Using This Medicine: · Tell your doctor if you are pregnant or breastfeeding, or if you have kidney disease, liver disease, heart disease, heart rhythm problems, heart failure, or myasthenia gravis. Tell your doctor if anyone in your family has heart rhythm problems. · This medicine may cause the following problems:  
¨ Serious skin reactions ¨ Liver problems ¨ Infantile hypertrophic pyloric stenosis ¨ Heart rhythm problems · This medicine can cause diarrhea. Call your doctor if the diarrhea becomes severe, does not stop, or is bloody. Do not take any medicine to stop diarrhea until you have talked to your doctor. Diarrhea can occur 2 months or more after you stop taking this medicine. It may occur 2 months or more after you stop using this medicine. · Call your doctor if your symptoms do not improve or if they get worse. · Keep all medicine out of the reach of children.  Never share your medicine with anyone. Possible Side Effects While Using This Medicine:  
Call your doctor right away if you notice any of these side effects: · Allergic reaction: Itching or hives, swelling in your face or hands, swelling or tingling in your mouth or throat, chest tightness, trouble breathing · Blistering, peeling, red skin rash · Dark urine, pale stools, nausea, vomiting, loss of appetite, stomach pain, yellow skin or eyes · Double vision, tiredness or weakness · Fainting, dizziness, lightheadedness · Fast, pounding, or uneven heartbeat, chest pain · Feeling irritable or vomits after feeding (in babies) · Severe diarrhea that may contain blood, stomach cramps, fever If you notice these less serious side effects, talk with your doctor: · Mild diarrhea, nausea, vomiting, stomach pain If you notice other side effects that you think are caused by this medicine, tell your doctor. Call your doctor for medical advice about side effects. You may report side effects to FDA at 8-373-FDA-7863 © 2017 [facility] Information is for End User's use only and may not be sold, redistributed or otherwise used for commercial purposes. The above information is an  only. It is not intended as medical advice for individual conditions or treatments. Talk to your doctor, nurse or pharmacist before following any medical regimen to see if it is safe and effective for you. Fluticasone (Into the nose) Fluticasone ([id number]-[id number]-a-sone) Treats allergy symptoms, such as runny or stuffy nose. This medicine is a corticosteroid. Brand Name(s): Children's Flonase, ClariSpray, DermacinRx [place], DermacinRx [place], [address] [place] [address], Flonase Sensimist, Fluticasone Propionate Novaplus, Ticaspray, Veramyst  
There may be other brand names for this medicine. When This Medicine Should Not Be Used: This medicine is not right for everyone.  Do not use if you had an allergic reaction to fluticasone. How to Use This Medicine:  
Spray · Your doctor will tell you how much medicine to use. Do not use more than directed. · This medicine is for use only in the nose. Do not get any of it in your eyes or on your skin. If it does get on these areas, rinse it off right away. · Prime the spray: Release 6 test sprays into the air away from the face, or pump the bottle until some of the medicine sprays out. Now it is ready to use. Prime the spray if it has not been used for more than 7 days (or 30 days for Veramyst®) or if the cap has been left off the bottle for 5 days or longer. · Shake the medicine well just before each use. · Before using the medicine, gently blow your nose to clear the nostrils. · After using the nasal spray, wipe the tip of the bottle with a clean tissue and put the cap back on. · You may need to use this medicine for a few days before you start to feel better. · Read and follow the patient instructions that come with this medicine. Talk to your doctor or pharmacist if you have any questions. · Follow the instructions on the medicine label if you are using this medicine without a prescription. · Missed dose: Take a dose as soon as you remember. If it is almost time for your next dose, wait until then and take a regular dose. Do not take extra medicine to make up for a missed dose. · Keep the bottle tightly closed when not using it. Store at room temperature, away from heat and direct light. Do not freeze or refrigerate. Throw this medicine away after you use 120 sprays. Drugs and Foods to Avoid: Ask your doctor or pharmacist before using any other medicine, including over-the-counter medicines, vitamins, and herbal products. · Do not use this medicine together with ritonavir. · Some foods and medicines can affect how fluticasone works. Tell your doctor if you are using ketoconazole. Warnings While Using This Medicine: · Tell your doctor if you are pregnant or breastfeeding, or if you have liver disease, asthma, an infection, or a history of cataracts or glaucoma. Make sure your doctor knows if you have had nose surgery, a nose injury, or a recent infection in your nose. · This medicine may cause the following problems: 
¨ Holes or ulcers inside the nose ¨ Slow wound healing ¨ Cataracts or glaucoma ¨ Problems with the adrenal glands ¨ Slow growth in children · Avoid people who are sick or have infections. Tell your doctor right away if you think you have been exposed to measles or chickenpox. · Your doctor will check your progress and the effects of this medicine at regular visits. Keep all appointments. · Call your doctor if your symptoms do not improve or if they get worse. · Keep all medicine out of the reach of children. Never share your medicine with anyone. Possible Side Effects While Using This Medicine:  
Call your doctor right away if you notice any of these side effects: · Allergic reaction: Itching or hives, swelling in your face or hands, swelling or tingling in your mouth or throat, chest tightness, trouble breathing · Burning, redness, swelling, or irritation around or inside your nose · Eye pain or vision changes · Fever, chills, cough, sore throat, and body aches · Heavy nosebleeds · Sores or white patches inside the nose or mouth · Tiredness, weakness, dizziness If you notice other side effects that you think are caused by this medicine, tell your doctor. Call your doctor for medical advice about side effects. You may report side effects to FDA at 9-044-FDA-0272 © 2017 [facility] Information is for End User's use only and may not be sold, redistributed or otherwise used for commercial purposes. The above information is an  only. It is not intended as medical advice for individual conditions or treatments.  Talk to your doctor, nurse or pharmacist before following any medical regimen to see if it is safe and effective for you. Please provide this summary of care documentation to your next provider. Signatures-by signing, you are acknowledging that this After Visit Summary has been reviewed with you and you have received a copy. Patient Signature:  ____________________________________________________________ Date:  ____________________________________________________________  
  
Zaira Micheline Provider Signature:  ____________________________________________________________ Date:  ____________________________________________________________

## 2017-12-19 ENCOUNTER — APPOINTMENT (OUTPATIENT)
Dept: ULTRASOUND IMAGING | Age: 82
DRG: 871 | End: 2017-12-19
Attending: HOSPITALIST
Payer: MEDICARE

## 2017-12-19 PROBLEM — J18.9 COMMUNITY ACQUIRED PNEUMONIA: Status: ACTIVE | Noted: 2017-12-19

## 2017-12-19 LAB
25(OH)D3 SERPL-MCNC: 107.8 NG/ML (ref 30–100)
ALBUMIN SERPL-MCNC: 2.4 G/DL (ref 3.4–5)
ANION GAP SERPL CALC-SCNC: 13 MMOL/L (ref 3–18)
BACTERIA SPEC CULT: NORMAL
BASOPHILS # BLD: 0 K/UL (ref 0–0.1)
BASOPHILS NFR BLD: 0 % (ref 0–2)
BUN SERPL-MCNC: 78 MG/DL (ref 7–18)
BUN/CREAT SERPL: 12 (ref 12–20)
CALCIUM SERPL-MCNC: 6.3 MG/DL (ref 8.5–10.1)
CALCIUM SERPL-MCNC: 6.4 MG/DL (ref 8.5–10.1)
CHLORIDE SERPL-SCNC: 107 MMOL/L (ref 100–108)
CO2 SERPL-SCNC: 20 MMOL/L (ref 21–32)
CREAT SERPL-MCNC: 6.35 MG/DL (ref 0.6–1.3)
DIFFERENTIAL METHOD BLD: ABNORMAL
EOSINOPHIL # BLD: 0.2 K/UL (ref 0–0.4)
EOSINOPHIL NFR BLD: 3 % (ref 0–5)
ERYTHROCYTE [DISTWIDTH] IN BLOOD BY AUTOMATED COUNT: 15.7 % (ref 11.6–14.5)
FERRITIN SERPL-MCNC: 338 NG/ML (ref 8–388)
FOLATE SERPL-MCNC: >20 NG/ML (ref 3.1–17.5)
GLUCOSE BLD STRIP.AUTO-MCNC: 303 MG/DL (ref 70–110)
GLUCOSE BLD STRIP.AUTO-MCNC: 368 MG/DL (ref 70–110)
GLUCOSE SERPL-MCNC: 184 MG/DL (ref 74–99)
HCT VFR BLD AUTO: 24.4 % (ref 36–48)
HGB BLD-MCNC: 7.6 G/DL (ref 13–16)
IRON SATN MFR SERPL: 11 %
IRON SERPL-MCNC: 15 UG/DL (ref 50–175)
LYMPHOCYTES # BLD: 1.6 K/UL (ref 0.9–3.6)
LYMPHOCYTES NFR BLD: 28 % (ref 21–52)
MAGNESIUM SERPL-MCNC: 1.5 MG/DL (ref 1.6–2.6)
MCH RBC QN AUTO: 27.1 PG (ref 24–34)
MCHC RBC AUTO-ENTMCNC: 31.1 G/DL (ref 31–37)
MCV RBC AUTO: 87.1 FL (ref 74–97)
MONOCYTES # BLD: 1 K/UL (ref 0.05–1.2)
MONOCYTES NFR BLD: 17 % (ref 3–10)
NEUTS SEG # BLD: 2.9 K/UL (ref 1.8–8)
NEUTS SEG NFR BLD: 52 % (ref 40–73)
PHOSPHATE SERPL-MCNC: 4.3 MG/DL (ref 2.5–4.9)
PLATELET # BLD AUTO: 195 K/UL (ref 135–420)
PMV BLD AUTO: 9.9 FL (ref 9.2–11.8)
POTASSIUM SERPL-SCNC: 4 MMOL/L (ref 3.5–5.5)
PTH-INTACT SERPL-MCNC: 229.5 PG/ML (ref 14–72)
RBC # BLD AUTO: 2.8 M/UL (ref 4.7–5.5)
SERVICE CMNT-IMP: NORMAL
SODIUM SERPL-SCNC: 140 MMOL/L (ref 136–145)
TIBC SERPL-MCNC: 141 UG/DL (ref 250–450)
VIT B12 SERPL-MCNC: 675 PG/ML (ref 211–911)
WBC # BLD AUTO: 5.7 K/UL (ref 4.6–13.2)

## 2017-12-19 PROCEDURE — 74011250636 HC RX REV CODE- 250/636: Performed by: HOSPITALIST

## 2017-12-19 PROCEDURE — 82962 GLUCOSE BLOOD TEST: CPT

## 2017-12-19 PROCEDURE — 77030010545

## 2017-12-19 PROCEDURE — 77010033678 HC OXYGEN DAILY

## 2017-12-19 PROCEDURE — 83540 ASSAY OF IRON: CPT | Performed by: HOSPITALIST

## 2017-12-19 PROCEDURE — 74011000250 HC RX REV CODE- 250: Performed by: HOSPITALIST

## 2017-12-19 PROCEDURE — 80048 BASIC METABOLIC PNL TOTAL CA: CPT | Performed by: HOSPITALIST

## 2017-12-19 PROCEDURE — 74011250637 HC RX REV CODE- 250/637: Performed by: INTERNAL MEDICINE

## 2017-12-19 PROCEDURE — 82040 ASSAY OF SERUM ALBUMIN: CPT | Performed by: INTERNAL MEDICINE

## 2017-12-19 PROCEDURE — 83735 ASSAY OF MAGNESIUM: CPT | Performed by: HOSPITALIST

## 2017-12-19 PROCEDURE — 82607 VITAMIN B-12: CPT | Performed by: HOSPITALIST

## 2017-12-19 PROCEDURE — 92610 EVALUATE SWALLOWING FUNCTION: CPT

## 2017-12-19 PROCEDURE — 84100 ASSAY OF PHOSPHORUS: CPT | Performed by: HOSPITALIST

## 2017-12-19 PROCEDURE — 83970 ASSAY OF PARATHORMONE: CPT | Performed by: INTERNAL MEDICINE

## 2017-12-19 PROCEDURE — 74011636637 HC RX REV CODE- 636/637: Performed by: HOSPITALIST

## 2017-12-19 PROCEDURE — 77030011256 HC DRSG MEPILEX <16IN NO BORD MOLN -A

## 2017-12-19 PROCEDURE — 74011250637 HC RX REV CODE- 250/637: Performed by: HOSPITALIST

## 2017-12-19 PROCEDURE — 74011636637 HC RX REV CODE- 636/637: Performed by: INTERNAL MEDICINE

## 2017-12-19 PROCEDURE — 85025 COMPLETE CBC W/AUTO DIFF WBC: CPT | Performed by: HOSPITALIST

## 2017-12-19 PROCEDURE — 65660000000 HC RM CCU STEPDOWN

## 2017-12-19 PROCEDURE — 76770 US EXAM ABDO BACK WALL COMP: CPT

## 2017-12-19 PROCEDURE — 74011250636 HC RX REV CODE- 250/636: Performed by: INTERNAL MEDICINE

## 2017-12-19 PROCEDURE — 36415 COLL VENOUS BLD VENIPUNCTURE: CPT | Performed by: HOSPITALIST

## 2017-12-19 PROCEDURE — 82728 ASSAY OF FERRITIN: CPT | Performed by: HOSPITALIST

## 2017-12-19 PROCEDURE — 82306 VITAMIN D 25 HYDROXY: CPT | Performed by: INTERNAL MEDICINE

## 2017-12-19 RX ORDER — CARVEDILOL 12.5 MG/1
12.5 TABLET ORAL 2 TIMES DAILY WITH MEALS
Status: DISCONTINUED | OUTPATIENT
Start: 2017-12-19 | End: 2017-12-22 | Stop reason: HOSPADM

## 2017-12-19 RX ORDER — SODIUM CHLORIDE 9 MG/ML
40 INJECTION, SOLUTION INTRAVENOUS CONTINUOUS
Status: DISCONTINUED | OUTPATIENT
Start: 2017-12-19 | End: 2017-12-22

## 2017-12-19 RX ORDER — TORSEMIDE 20 MG/1
20 TABLET ORAL DAILY
Status: DISCONTINUED | OUTPATIENT
Start: 2017-12-19 | End: 2017-12-19

## 2017-12-19 RX ORDER — CALCITRIOL 0.25 UG/1
0.25 CAPSULE ORAL DAILY
Status: DISCONTINUED | OUTPATIENT
Start: 2017-12-19 | End: 2017-12-20

## 2017-12-19 RX ORDER — ASPIRIN 81 MG/1
81 TABLET ORAL DAILY
Status: DISCONTINUED | OUTPATIENT
Start: 2017-12-19 | End: 2017-12-22 | Stop reason: HOSPADM

## 2017-12-19 RX ORDER — TIMOLOL MALEATE 5 MG/ML
1 SOLUTION/ DROPS OPHTHALMIC 2 TIMES DAILY
Status: DISCONTINUED | OUTPATIENT
Start: 2017-12-19 | End: 2017-12-22 | Stop reason: HOSPADM

## 2017-12-19 RX ORDER — HEPARIN SODIUM 5000 [USP'U]/ML
5000 INJECTION, SOLUTION INTRAVENOUS; SUBCUTANEOUS EVERY 8 HOURS
Status: DISCONTINUED | OUTPATIENT
Start: 2017-12-19 | End: 2017-12-22 | Stop reason: HOSPADM

## 2017-12-19 RX ORDER — CALCIUM ACETATE 667 MG/1
1 CAPSULE ORAL
Status: DISCONTINUED | OUTPATIENT
Start: 2017-12-19 | End: 2017-12-22 | Stop reason: HOSPADM

## 2017-12-19 RX ORDER — ATORVASTATIN CALCIUM 10 MG/1
10 TABLET, FILM COATED ORAL DAILY
Status: DISCONTINUED | OUTPATIENT
Start: 2017-12-19 | End: 2017-12-22 | Stop reason: HOSPADM

## 2017-12-19 RX ORDER — LANOLIN ALCOHOL/MO/W.PET/CERES
400 CREAM (GRAM) TOPICAL 2 TIMES DAILY
Status: COMPLETED | OUTPATIENT
Start: 2017-12-19 | End: 2017-12-21

## 2017-12-19 RX ORDER — HEPARIN SODIUM 5000 [USP'U]/ML
5000 INJECTION, SOLUTION INTRAVENOUS; SUBCUTANEOUS EVERY 8 HOURS
Status: DISCONTINUED | OUTPATIENT
Start: 2017-12-19 | End: 2017-12-19

## 2017-12-19 RX ORDER — CLONIDINE HYDROCHLORIDE 0.1 MG/1
0.2 TABLET ORAL 2 TIMES DAILY
Status: DISCONTINUED | OUTPATIENT
Start: 2017-12-19 | End: 2017-12-22 | Stop reason: HOSPADM

## 2017-12-19 RX ORDER — INSULIN LISPRO 100 [IU]/ML
INJECTION, SOLUTION INTRAVENOUS; SUBCUTANEOUS
Status: DISCONTINUED | OUTPATIENT
Start: 2017-12-19 | End: 2017-12-22 | Stop reason: HOSPADM

## 2017-12-19 RX ORDER — LEVOBUNOLOL HYDROCHLORIDE 5 MG/ML
1 SOLUTION/ DROPS OPHTHALMIC
Status: DISCONTINUED | OUTPATIENT
Start: 2017-12-19 | End: 2017-12-22 | Stop reason: HOSPADM

## 2017-12-19 RX ORDER — AMLODIPINE BESYLATE 10 MG/1
10 TABLET ORAL DAILY
Status: DISCONTINUED | OUTPATIENT
Start: 2017-12-19 | End: 2017-12-22 | Stop reason: HOSPADM

## 2017-12-19 RX ORDER — FERROUS SULFATE, DRIED 160(50) MG
1 TABLET, EXTENDED RELEASE ORAL 2 TIMES DAILY WITH MEALS
Status: DISCONTINUED | OUTPATIENT
Start: 2017-12-19 | End: 2017-12-20

## 2017-12-19 RX ORDER — ISOSORBIDE MONONITRATE 30 MG/1
30 TABLET, EXTENDED RELEASE ORAL DAILY
Status: DISCONTINUED | OUTPATIENT
Start: 2017-12-19 | End: 2017-12-22 | Stop reason: HOSPADM

## 2017-12-19 RX ORDER — OXYCODONE AND ACETAMINOPHEN 5; 325 MG/1; MG/1
1 TABLET ORAL
Status: DISCONTINUED | OUTPATIENT
Start: 2017-12-19 | End: 2017-12-22 | Stop reason: HOSPADM

## 2017-12-19 RX ORDER — MAGNESIUM SULFATE 100 %
4 CRYSTALS MISCELLANEOUS AS NEEDED
Status: DISCONTINUED | OUTPATIENT
Start: 2017-12-19 | End: 2017-12-22 | Stop reason: HOSPADM

## 2017-12-19 RX ORDER — SODIUM BICARBONATE 650 MG/1
325 TABLET ORAL 2 TIMES DAILY
Status: DISCONTINUED | OUTPATIENT
Start: 2017-12-19 | End: 2017-12-22 | Stop reason: HOSPADM

## 2017-12-19 RX ORDER — INSULIN GLARGINE 100 [IU]/ML
10 INJECTION, SOLUTION SUBCUTANEOUS
Status: DISCONTINUED | OUTPATIENT
Start: 2017-12-19 | End: 2017-12-22 | Stop reason: HOSPADM

## 2017-12-19 RX ORDER — DEXTROSE MONOHYDRATE 25 G/50ML
25-50 INJECTION, SOLUTION INTRAVENOUS AS NEEDED
Status: DISCONTINUED | OUTPATIENT
Start: 2017-12-19 | End: 2017-12-22 | Stop reason: HOSPADM

## 2017-12-19 RX ORDER — ACETAMINOPHEN 325 MG/1
650 TABLET ORAL
Status: DISCONTINUED | OUTPATIENT
Start: 2017-12-19 | End: 2017-12-22 | Stop reason: HOSPADM

## 2017-12-19 RX ORDER — HYDRALAZINE HYDROCHLORIDE 10 MG/1
10 TABLET, FILM COATED ORAL 3 TIMES DAILY
Status: DISCONTINUED | OUTPATIENT
Start: 2017-12-19 | End: 2017-12-22 | Stop reason: HOSPADM

## 2017-12-19 RX ADMIN — INSULIN GLARGINE 10 UNITS: 100 INJECTION, SOLUTION SUBCUTANEOUS at 16:54

## 2017-12-19 RX ADMIN — CALCIUM ACETATE 667 MG: 667 CAPSULE ORAL at 16:55

## 2017-12-19 RX ADMIN — HYDRALAZINE HYDROCHLORIDE 10 MG: 10 TABLET, FILM COATED ORAL at 16:55

## 2017-12-19 RX ADMIN — ERYTHROPOIETIN 10000 UNITS: 10000 INJECTION, SOLUTION INTRAVENOUS; SUBCUTANEOUS at 18:47

## 2017-12-19 RX ADMIN — TIMOLOL MALEATE 1 DROP: 5 SOLUTION OPHTHALMIC at 09:17

## 2017-12-19 RX ADMIN — Medication 400 MG: at 18:47

## 2017-12-19 RX ADMIN — ATORVASTATIN CALCIUM 10 MG: 10 TABLET, FILM COATED ORAL at 09:03

## 2017-12-19 RX ADMIN — ISOSORBIDE MONONITRATE 30 MG: 30 TABLET, EXTENDED RELEASE ORAL at 09:03

## 2017-12-19 RX ADMIN — WATER 1 G: 1 INJECTION INTRAMUSCULAR; INTRAVENOUS; SUBCUTANEOUS at 03:48

## 2017-12-19 RX ADMIN — CLONIDINE HYDROCHLORIDE 0.2 MG: 0.1 TABLET ORAL at 16:55

## 2017-12-19 RX ADMIN — SODIUM BICARBONATE 650 MG TABLET 325 MG: at 16:56

## 2017-12-19 RX ADMIN — HYDRALAZINE HYDROCHLORIDE 10 MG: 10 TABLET, FILM COATED ORAL at 21:29

## 2017-12-19 RX ADMIN — OYSTER SHELL CALCIUM WITH VITAMIN D 1 TABLET: 500; 200 TABLET, FILM COATED ORAL at 16:55

## 2017-12-19 RX ADMIN — OXYCODONE HYDROCHLORIDE AND ACETAMINOPHEN 1 TABLET: 5; 325 TABLET ORAL at 22:39

## 2017-12-19 RX ADMIN — LEVOBUNOLOL HYDROCHLORIDE 1 DROP: 5 SOLUTION/ DROPS OPHTHALMIC at 22:36

## 2017-12-19 RX ADMIN — HYDRALAZINE HYDROCHLORIDE 10 MG: 10 TABLET, FILM COATED ORAL at 09:03

## 2017-12-19 RX ADMIN — SODIUM CHLORIDE 75 ML/HR: 900 INJECTION, SOLUTION INTRAVENOUS at 03:48

## 2017-12-19 RX ADMIN — HEPARIN SODIUM 5000 UNITS: 5000 INJECTION, SOLUTION INTRAVENOUS; SUBCUTANEOUS at 03:48

## 2017-12-19 RX ADMIN — ACETAMINOPHEN 650 MG: 325 TABLET ORAL at 05:53

## 2017-12-19 RX ADMIN — ASPIRIN 81 MG: 81 TABLET ORAL at 09:03

## 2017-12-19 RX ADMIN — CARVEDILOL 12.5 MG: 12.5 TABLET, FILM COATED ORAL at 16:55

## 2017-12-19 RX ADMIN — SODIUM BICARBONATE 650 MG TABLET 325 MG: at 09:03

## 2017-12-19 RX ADMIN — IRON SUCROSE 300 MG: 20 INJECTION, SOLUTION INTRAVENOUS at 18:47

## 2017-12-19 RX ADMIN — AMLODIPINE BESYLATE 10 MG: 10 TABLET ORAL at 09:03

## 2017-12-19 RX ADMIN — CLONIDINE HYDROCHLORIDE 0.2 MG: 0.1 TABLET ORAL at 09:03

## 2017-12-19 RX ADMIN — CALCITRIOL 0.25 MCG: 0.25 CAPSULE, LIQUID FILLED ORAL at 09:03

## 2017-12-19 RX ADMIN — OYSTER SHELL CALCIUM WITH VITAMIN D 1 TABLET: 500; 200 TABLET, FILM COATED ORAL at 09:04

## 2017-12-19 RX ADMIN — OXYCODONE HYDROCHLORIDE AND ACETAMINOPHEN 1 TABLET: 5; 325 TABLET ORAL at 01:12

## 2017-12-19 RX ADMIN — INSULIN LISPRO 8 UNITS: 100 INJECTION, SOLUTION INTRAVENOUS; SUBCUTANEOUS at 21:29

## 2017-12-19 RX ADMIN — SODIUM CHLORIDE 500 MG: 900 INJECTION, SOLUTION INTRAVENOUS at 05:54

## 2017-12-19 RX ADMIN — HEPARIN SODIUM 5000 UNITS: 5000 INJECTION, SOLUTION INTRAVENOUS; SUBCUTANEOUS at 16:55

## 2017-12-19 RX ADMIN — TIMOLOL MALEATE 1 DROP: 5 SOLUTION OPHTHALMIC at 16:56

## 2017-12-19 RX ADMIN — CARVEDILOL 12.5 MG: 12.5 TABLET, FILM COATED ORAL at 09:03

## 2017-12-19 RX ADMIN — CALCIUM ACETATE 667 MG: 667 CAPSULE ORAL at 13:55

## 2017-12-19 RX ADMIN — CALCIUM ACETATE 667 MG: 667 CAPSULE ORAL at 09:04

## 2017-12-19 RX ADMIN — INSULIN LISPRO 10 UNITS: 100 INJECTION, SOLUTION INTRAVENOUS; SUBCUTANEOUS at 16:54

## 2017-12-19 NOTE — ED NOTES
Attempted to call report to [address] [place] [address], placed on hold for 10 minutes before line went dead. Attempted to call back with no answer.

## 2017-12-19 NOTE — DIABETES MGMT
GLYCEMIC CONTROL PLAN OF CARE    Assessment:  Pt is 80 yr old male admitted on 12/18/17 for treatment and evaluation of shortness of breath, cough, ARF & Anemia. Pt with past medical history significant for T2DM, HTN, HLD, CAD, Cardiomyopathy. Recommendations:  Diabetic education    Recommend initiate correctional lispro scale ACHS. Recommend add diabetic diet to current cardiac diet. Will continue to monitor inpatient for intervention.     Most recent blood glucose values:  Lab Results   Component Value Date/Time     (H) 12/19/2017 10:50 AM       Current A1C:  New one pending last one from September 2010    Current hospital diabetes medications:  none    Diet:   Cardiac    Home diabetes medications:  Pt reports taking 15 units lantus and lispro ssi pt reports 5-10 units    Goals:  Pt BG will be within target range by _12/22/17____    Education:  _x__  Refer to Diabetes Education Record             ___  Education not indicated at this time    Maile Guzmán [address], [address] [place] [address], [facility]  Pager: [phone]

## 2017-12-19 NOTE — ROUTINE PROCESS
Bedside and Verbal shift change report given to Zach Cardona (oncoming nurse) by Elene Soulier (offgoing nurse). Report included the following information SBAR, Kardex, MAR and Recent Results.     SITUATION:  Code Status: Full Code  Reason for Admission: Febrile illness  Anemia  Renal failure  Anemia  Renal failure  Febrile illness  Community acquired pneumonia  Hospital day: 1  Problem List:       Hospital Problems  Date Reviewed: 9/25/2017          Codes Class Noted POA    Community acquired pneumonia ICD-10-CM: J18.9  ICD-9-CM: 486  12/19/2017 Unknown        Renal failure ICD-10-CM: N19  ICD-9-CM: 586  12/18/2017 Unknown        Anemia ICD-10-CM: D64.9  ICD-9-CM: 285.9  12/18/2017 Unknown        Febrile illness ICD-10-CM: R50.9  ICD-9-CM: 780.60  12/18/2017 Unknown              BACKGROUND:   Past Medical History:   Past Medical History:   Diagnosis Date    CAD (coronary artery disease)     Chronic combined systolic and diastolic heart failure ([facility] [address] 3/17/2015    stable limited activity recent admsission     Chronic kidney disease, unspecified 3/17/2015    not on acei/arb     Coronary atherosclerosis of native coronary artery 3/17/2015    abnormal stress test medically managed no angina     Diabetes ([facility] [address]     Diabetes mellitus     Essential hypertension     Essential hypertension, benign     On Hydralazine, Labetalol, clonidine and norvasc HX of ARF and hyperkalemia with ARB; stable    High cholesterol     Hypertension     Other and unspecified hyperlipidemia     On Zocor and Niaspan    Other primary cardiomyopathies 3/17/2015    ef 45%     Peripheral vascular disease, unspecified     Bilateral AKA    Peripheral vascular disease, unspecified     Bilateral AKA     Reflux     Renal insufficiency       Patient taking anticoagulants no    Patient has a defibrillator: no    History of shots YES for example, flu, pneumonia, tetanus   Isolation History NO for example, MRSA, CDiff    ASSESSMENT:  Changes in Assessment Throughout Shift:   Significant Changes in 24 hours (for example, RR/code, fall)  Patient has Central Line: no Reasons if yes:   Patient has Barraza Cath: no Reasons if yes:       Mobility Issues  PT  IV Patency  OR Checklist  Pending Tests    Last Vitals:  Vitals w/ MEWS Score (last day)     Date/Time MEWS Score Pulse Resp Temp BP Level of Consciousness SpO2    12/19/17 0315 3 92 18 (!)  102.4 °F (39.1 °C) 155/70 Alert 97 %    12/18/17 2349 1 88 20 (!)  100.8 °F (38.2 °C) 180/70 Alert 97 %    12/18/17 2130 -- 86 20 -- 162/58 -- 98 %    12/18/17 2100 -- 84 17 -- 159/54 -- 96 %    12/18/17 2045 -- 81 22 -- 152/51 -- 96 %    12/18/17 2030 -- 84 23 -- 159/57 -- 97 %    12/18/17 2015 -- 83 10 -- 161/54 -- 97 %    12/18/17 2000 -- 82 23 -- 155/52 -- 98 %    12/18/17 1945 -- 86 23 -- 153/55 -- 96 %    12/18/17 1930 2 80 21 98.9 °F (37.2 °C) 151/55 Alert 97 %    12/18/17 19:21:26 -- -- -- -- -- -- 96 %    12/18/17 1919 0 84 14 99.4 °F (37.4 °C) 153/58 Alert 96 %    12/18/17 1915 -- 86 19 -- 156/54 -- 96 %    12/18/17 1858 -- -- -- 98.5 °F (36.9 °C) -- Alert --    12/18/17 1830 -- -- -- -- 131/53 -- 97 %    12/18/17 1800 -- -- -- -- 142/55 -- 97 %    12/18/17 1745 -- -- -- -- 148/52 -- 98 %    12/18/17 1715 -- -- -- -- 146/53 -- 99 %    12/18/17 1630 -- -- -- -- 145/50 -- 97 %    12/18/17 1300 -- -- -- -- -- -- (!)  89 %    12/18/17 1149 -- -- -- (!)  101.7 °F (38.7 °C) -- Alert 96 %    12/18/17 1135 -- 98 -- 99.9 °F (37.7 °C) 157/63 Alert --            PAIN    Pain Assessment    Pain Intensity 1: 0 (12/19/17 0200)    Pain Location 1: Foot    Pain Intervention(s) 1: Medication (see MAR)    Patient Stated Pain Goal: 0  Intervention effective: yes  Time of last intervention:  Reassessment Completed: yes   Other actions taken for pain:     Last 3 Weights:  Last 3 Recorded Weights in this Encounter    12/18/17 1135 12/18/17 7282   Weight: 72.6 kg (160 lb) 72.3 kg (159 lb 4.8 oz)   Weight change: INTAKE/OUPUT    Current Shift:      Last three shifts:      RECOMMENDATIONS AND DISCHARGE PLANNING  Patient needs and requests:     Pending tests/procedures: Retroperitoneal com     Discharge plan for patient:     Discharge planning Needs or Barriers:     Estimated Discharge Date: TBD Posted on Whiteboard in Patients Room: yes       \"HEALS\" SAFETY CHECK  A safety check occurred in the patient's room between off going nurse and oncoming nurse listed above. The safety check included the below items:    H  High Alert Medications Verify all high alert medication drips (heparin, PCA, etc.)  E  Equipment Suction is set up for ALL patients (with brenda)  Red plugs utilized for all equipment (IV pumps, etc.)  WOWs wiped down at end of shift. Room stocked with oxygen, suction, and other unit-specific supplies  A  Alarms Bed alarm is set for fall risk patients  Ensure chair alarm is in place and activated if patient is up in a chair  L  Lines Check IV for any infiltration  Barraza bag is empty if patient has a Barraza   Tubing and IV bags are labeled  S  Safety  Room is clean, patient is clean, and equipment is clean. Hallways are clear from equipment besides carts. Fall bracelet on for fall risk patients  Ensure room is clear and free of clutter  Suction is set up for ALL patients (with brenda)  Hallways are clear from equipment besides carts.    Isolation precautions followed, supplies available outside room, sign posted    Arsen Campos

## 2017-12-19 NOTE — ROUTINE PROCESS
Bedside shift change report given to Veronica Izquierdo RN (oncoming nurse) by Bridgette Parks RN (offgoing nurse). Report included the following information SBAR, Kardex, Intake/Output, MAR, Accordion, Recent Results and Med Rec Status.

## 2017-12-19 NOTE — ED NOTES
TRANSFER - OUT REPORT:    Verbal report given to Riky Kline RN (name) on [facility] Mor[facility]  [facility] routine progression of care       Report consisted of patients Situation, Background, Assessment and   Recommendations(SBAR). Information from the following report(s) SBAR, Kardex, MAR, Recent Results and Cardiac Rhythm - Sinus Rhythm. was reviewed with the receiving nurse. Lines:   Peripheral IV 12/18/17 (Active)   Site Assessment Clean, dry, & intact 12/18/2017  8:39 PM   Dressing Status Clean, dry, & intact 12/18/2017  8:39 PM   Dressing Type Transparent 12/18/2017  8:39 PM   Hub Color/Line Status Pink 12/18/2017  8:39 PM   Alcohol Cap Used Yes 12/18/2017  8:39 PM        Opportunity for questions and clarification was provided. Patient transported with:   Monitor  O2 @ 2 liters via nasal cannula.

## 2017-12-19 NOTE — ED NOTES
Pt awake/alert/oriented. Affect is calm, respirations regular/non labored/skin warm and dry. All orientation questions answered correctly. Respirations are regular/non labored; denies shortness of breath. Intermittent non productive cough noted. Plan of care and wait for bed explained/understood. No distress noted. Rails up x 2 and call light in reach.

## 2017-12-19 NOTE — PROGRESS NOTES
Problem: Falls - Risk of  Goal: *Absence of Falls  Document Hudson Fall Risk and appropriate interventions in the flowsheet.    Outcome: Progressing Towards Goal  Fall Risk Interventions:  Mobility Interventions: Bed/chair exit alarm, Patient to call before getting OOB              Elimination Interventions: Call light in reach, Patient to call for help with toileting needs, Urinal in reach

## 2017-12-19 NOTE — PROGRESS NOTES
Problem: Dysphagia (Adult)  Goal: *Speech Goal: (INSERT TEXT)  Patient will:  1. Tolerate PO trials with 0 s/s overt distress in 4/5 trials  2. Utilize compensatory swallow strategies/maneuvers (decrease bite/sip, size/rate, alt. liq/sol) with min cues in 4/5 trials  3. Perform oral-motor/laryngeal exercises to increase oropharyngeal swallow function with min cues      Rec:     Regular diet with thin liquids  Aspiration precautions  HOB >45 during po intake, remain >30 for 30-45 minutes after po   Small bites/sips; alternate liquid/solid with slow feeding rate   Oral care TID  Meds per pt preference        Speech LAnguage Pathology bedside swallow evaluation    Patient: Bennie Huynh (51 y.o. male)  Date: 12/19/2017  Primary Diagnosis: Febrile illness  Anemia  Renal failure  Anemia  Renal failure  Febrile illness  Community acquired pneumonia        Precautions: aspiration risk       ASSESSMENT :  Based on the objective data described below, the patient presents with mild oral dysphagia c/b decreased lingual strength, resulting in min oral residue. Intermittent delayed congested cough noted throughout po trials, however pt also noted with cough at baseline. Patient will benefit from skilled intervention to address the above impairments. Patients rehabilitation potential is considered to be Good  Factors which may influence rehabilitation potential include:   []            None noted  []            Mental ability/status  [x]            Medical condition  []            Home/family situation and support systems  []            Safety awareness  []            Pain tolerance/management  []            Other:      PLAN :  Recommendations and Planned Interventions:  Regular diet with thin liquids  Frequency/Duration: Patient will be followed by speech-language pathology 1-2 times per day/4-7 days per week to address goals. Discharge Recommendations:  To Be Determined     SUBJECTIVE:   Patient stated I'm doing fine.    OBJECTIVE:     Past Medical History:   Diagnosis Date    CAD (coronary artery disease)     Chronic combined systolic and diastolic heart failure ([facility] [address] 3/17/2015    stable limited activity recent admsission     Chronic kidney disease, unspecified 3/17/2015    not on acei/arb     Coronary atherosclerosis of native coronary artery 3/17/2015    abnormal stress test medically managed no angina     Diabetes ([facility] [address]     Diabetes mellitus     Essential hypertension     Essential hypertension, benign     On Hydralazine, Labetalol, clonidine and norvasc HX of ARF and hyperkalemia with ARB; stable    High cholesterol     Hypertension     Other and unspecified hyperlipidemia     On Zocor and Niaspan    Other primary cardiomyopathies 3/17/2015    ef 45%     Peripheral vascular disease, unspecified     Bilateral AKA    Peripheral vascular disease, unspecified     Bilateral AKA     Reflux     Renal insufficiency      Past Surgical History:   Procedure Laterality Date    HX ORTHOPAEDIC      double amputation,  hip replacement    HX PROSTATECTOMY       Prior Level of Function/Home Situation: Apartment   Home Situation  Home Environment: Apartment  One/Two Story Residence: Other (Comment)  Living Alone: No  Support Systems: Family member(s)  Patient Expects to be Discharged to[de-identified] Apartment  Current DME Used/Available at Home: Wheelchair, power  Diet prior to admission: regular  With thin liquids  Current Diet:  Regular with thin liquids  Cognitive and Communication Status:  Neurologic State: Alert  Orientation Level: Oriented X4  Cognition: Follows commands           Oral Assessment:  Oral Assessment  Labial: No impairment  Dentition: Upper & lower dentures  Lingual: Decreased strength  Velum: No impairment  Mandible: No impairment  P.O. Trials:  Patient Position: >70 degrees  Vocal quality prior to P.O.: No impairment  Consistency Presented: Solid; Thin liquid;Puree  How Presented: Self-fed/presented;Cup/sip;Straw;Successive swallows     Bolus Acceptance: No impairment        Propulsion: No impairment  Oral Residue: Less than 10% of bolus  Initiation of Swallow: No impairment  Laryngeal Elevation: Functional  Aspiration Signs/Symptoms: Delayed cough/throat clear  Pharyngeal Phase Characteristics: No impairment, issues, or problems   Effective Modifications: Cup/sip; Alternate liquids/solids  Cues for Modifications: Minimal       Oral Phase Severity: Mild  Pharyngeal Phase Severity : No impairment    GCODESwallowing:  Swallow Current Status CI= 1-19%   Swallow Goal Status CI= 1-19%    The severity rating is based on the following outcomes:    Clinical Judgment    Pain:  Pt c/o 0/10 pain prior to evaluation. Pt c/o 0/10 pain post evaluation. After treatment:   []            Patient left in no apparent distress sitting up in chair  [x]            Patient left in no apparent distress in bed  []            Call bell left within reach  []            Nursing notified  []            Caregiver present  []            Bed alarm activated    COMMUNICATION/EDUCATION:   [x]            SLP educated pt with regard to compensatory swallow strategies and       aspiration/reflux precautions including: small bites/sips,       alternate liquids/solids, decrease feeding rate, HOB > 45 with all po, and      upright in bed at 30 degrees after po for at least 45 minutes. []            Patient/family have participated as able in goal setting and plan of care. []            Patient/family agree to work toward stated goals and plan of care. [x]            Patient understands intent and goals of therapy; neutral about participation. []            Patient is unable to participate in goal setting and plan of care.     Thank you for this referral.  Yaneth Cano, SLP

## 2017-12-19 NOTE — CONSULTS
Consult Note  Consult requested by: Dr. Robert Wright is a 80 y.o. male [address] who is being seen on consult for CKD stage 5  Chief Complaint   Patient presents with    Cough    Nasal Congestion     Admission diagnosis: <principal problem not specified>     HPI: 81 yo AA male admitted for several  days of  Non productive cough and SOB. Denies any fever, CP/ N/V/D. He went to [facility] ER yesterday, had elevated Temp of 102.4 and was told he had pneumonia with worsening renal function. He was taken off his demadex, hydrated and sent here for admission. He follow with Dr Юлия Dutta and was last seen in early Nov of this year. Last crea in October was 5.6 ( baseline ). He has also been dx with anemia and on on Procrit at the [address] weekly. Also had BT in the past.  According to office records he has refused to consider dialysis but today he says he is aware he has to start but wants to hold off as long as possible. He denies any urinary voiding complaints, appetite remains fair, no significant weight changes. He had hx of Hyperkalemia with cardiac arrhythmia that required ? Temp pacemaker.     Past Medical History:   Diagnosis Date    CAD (coronary artery disease)     Chronic combined systolic and diastolic heart failure ([facility] 3/17/2015    stable limited activity recent admsission     Chronic kidney disease, unspecified 3/17/2015    not on acei/arb     Coronary atherosclerosis of native coronary artery 3/17/2015    abnormal stress test medically managed no angina     Diabetes ([facility]     Diabetes mellitus     Essential hypertension     Essential hypertension, benign     On Hydralazine, Labetalol, clonidine and norvasc HX of ARF and hyperkalemia with ARB; stable    High cholesterol     Hypertension     Other and unspecified hyperlipidemia     On Zocor and Niaspan    Other primary cardiomyopathies 3/17/2015    ef 45%     Peripheral vascular disease, unspecified     Bilateral AKA    Peripheral vascular disease, unspecified     Bilateral AKA     Reflux     Renal insufficiency       Past Surgical History:   Procedure Laterality Date    HX ORTHOPAEDIC      double amputation,  hip replacement    HX PROSTATECTOMY         Social History     Social History    Marital status:      Spouse name: N/A    Number of children: N/A    Years of education: N/A     Occupational History    Not on file. Social History Main Topics    Smoking status: Former Smoker     Packs/day: 2.00     Quit date: 2/27/1998    Smokeless tobacco: Never Used    Alcohol use No    Drug use: No    Sexual activity: Not on file     Other Topics Concern    Not on file     Social History Narrative       Family History   Problem Relation Age of Onset    Diabetes Mother     Heart Disease Mother     Cancer Other      Allergies   Allergen Reactions    Vancomycin Vertigo        Home Medications:     Prior to Admission Medications   Prescriptions Last Dose Informant Patient Reported? Taking? amLODIPine (NORVASC) 10 mg tablet 12/18/2017 at Unknown time  Yes Yes   Sig: Take  by mouth daily. aspirin delayed-release 81 mg tablet 12/18/2017 at Unknown time  Yes Yes   Sig: Take  by mouth daily. atorvastatin (LIPITOR) 10 mg tablet 12/18/2017 at Unknown time  Yes Yes   Sig: Take  by mouth daily. calcitRIOL (ROCALTROL) 0.25 mcg capsule 12/18/2017 at Unknown time  Yes Yes   Sig: Take 0.25 mcg by mouth daily. calcium acetate (PHOSLO) 667 mg cap 12/18/2017 at Unknown time  Yes Yes   Sig: Take  by mouth three (3) times daily (with meals). calcium-vitamin D (OYSTER SHELL CALCIUM-VIT D3) 500 mg(1,250mg) -200 unit per tablet 12/18/2017 at Unknown time  Yes Yes   Sig: Take 1 Tab by mouth two (2) times daily (with meals). carvedilol (COREG) 6.25 mg tablet 12/18/2017 at Unknown time  Yes Yes   Sig: Take 12.5 mg by mouth two (2) times daily (with meals).    cloNIDine (CATAPRESS) 0.3 mg tablet 2017 at Unknown time  Yes Yes   Sig: Take 0.2 mg by mouth two (2) times a day. ergocalciferol (ERGOCALCIFEROL) 50,000 unit capsule   Yes No   Sig: Take 50,000 Units by mouth. 2 times per week   glucose 4 gram chewable tablet 2017 at Unknown time  Yes Yes   Sig: Take 15 g by mouth as needed. 1-5 tabs as needed   hydrALAZINE (APRESOLINE) 10 mg tablet 2017 at Unknown time  Yes Yes   Sig: Take 10 mg by mouth. 2 1/2 tabs twice a day   insulin glargine (LANTUS) 100 unit/mL injection 2017 at Unknown time  Yes Yes   Sig: by SubCUTAneous route once. insulin lispro (HUMALOG) 100 unit/mL injection 2017 at Unknown time  Yes Yes   Sig: by SubCUTAneous route. isosorbide mononitrate ER (IMDUR) 30 mg tablet 2017 at Unknown time  Yes Yes   Sig: Take  by mouth daily. levobunolol (BETAGAN) 0.5 % ophthalmic solution 2017 at Unknown time  Yes Yes   Sig: Administer 1 Drop to both eyes nightly. oxyCODONE-acetaminophen (PERCOCET 10)  mg per tablet 2017 at Unknown time  Yes Yes   Sig: Take 1 Tab by mouth.   sodium bicarbonate 650 mg tablet 2017 at Unknown time  Yes Yes   Sig: Take  by mouth two (2) times a day. 2 tabs   timolol (TIMOPTIC) 0.5 % ophthalmic solution 2017 at Unknown time  Yes Yes   Si Drop two (2) times a day. torsemide (DEMADEX) 20 mg tablet 2017 at Unknown time  Yes Yes   Sig: Take  by mouth daily.  2 tabs every other day      Facility-Administered Medications: None       Current Facility-Administered Medications   Medication Dose Route Frequency    oxyCODONE-acetaminophen (PERCOCET) 5-325 mg per tablet 1 Tab  1 Tab Oral Q8H PRN    levobunolol (BETAGAN) 0.5 % ophthalmic solution 1 Drop  1 Drop Both Eyes QHS    atorvastatin (LIPITOR) tablet 10 mg  10 mg Oral DAILY    aspirin delayed-release tablet 81 mg  81 mg Oral DAILY    amLODIPine (NORVASC) tablet 10 mg  10 mg Oral DAILY    cloNIDine HCl (CATAPRES) tablet 0.2 mg  0.2 mg Oral BID    carvedilol (COREG) tablet 12.5 mg  12.5 mg Oral BID WITH MEALS    hydrALAZINE (APRESOLINE) tablet 10 mg  10 mg Oral TID    calcitRIOL (ROCALTROL) capsule 0.25 mcg  0.25 mcg Oral DAILY    timolol (TIMOPTIC) 0.5 % ophthalmic solution 1 Drop  1 Drop Both Eyes BID    calcium acetate (PHOSLO) capsule 667 mg  1 Cap Oral TID WITH MEALS    sodium bicarbonate tablet 325 mg  325 mg Oral BID    isosorbide mononitrate ER (IMDUR) tablet 30 mg  30 mg Oral DAILY    calcium-vitamin D (OS-LINETTE) 500 mg-200 unit tablet  1 Tab Oral BID WITH MEALS    0.9% sodium chloride infusion  75 mL/hr IntraVENous CONTINUOUS    cefTRIAXone (ROCEPHIN) 1 g in sterile water (preservative free) 10 mL IV syringe  1 g IntraVENous Q24H    acetaminophen (TYLENOL) tablet 650 mg  650 mg Oral Q6H PRN    insulin lispro (HUMALOG) injection   SubCUTAneous AC&HS    glucose chewable tablet 16 g  4 Tab Oral PRN    glucagon (GLUCAGEN) injection 1 mg  1 mg IntraMUSCular PRN    dextrose (D50) infusion 12.5-25 g  25-50 mL IntraVENous PRN    insulin glargine (LANTUS) injection 10 Units  10 Units SubCUTAneous ACD    heparin (porcine) injection 5,000 Units  5,000 Units SubCUTAneous Q8H       Review of Systems:   Pertinent items are noted in HPI. Data Review:    Labs: Results:       Chemistry Recent Labs      12/19/17   1050  12/18/17   1240   GLU  184*  288*   NA  140  138   K  4.0  4.3   CL  107  103   CO2  20*  25   BUN  78*  91*   CREA  6.35*  6.65*   CA  6.3*  7.5*   AGAP  13  10   BUCR  12  14      CBC w/Diff Recent Labs      12/19/17   1050  12/18/17   1951  12/18/17   1240   WBC  5.7  6.7  7.1   RBC  2.80*  2.98*  2.85*   HGB  7.6*  7.9*  7.5*   HCT  24.4*  26.3*  25.0*   PLT  195  205  217   GRANS  52  60  67   LYMPH  28  18*  13*   EOS  3  3  4      Coagulation No results for input(s): PTP, INR, APTT in the last 72 hours.     No lab exists for component: INREXT    Iron/Ferritin Recent Labs      12/19/17   1050   IRON  15*      BNP No results for input(s): BNPP in the last 72 hours. Cardiac Enzymes No results for input(s): CPK, CKND1, ROSA in the last 72 hours. No lab exists for component: CKRMB, TROIP   Liver Enzymes No results for input(s): TP, ALB, TBIL, AP, SGOT, GPT in the last 72 hours. No lab exists for component: DBIL   Thyroid Studies Lab Results   Component Value Date/Time    TSH 1.94 09/01/2010 11:15 AM           IMAGES: CXR no acute findings    CULTURE: Blood c/s neg so far  Influenza A and B neg    Physical Assessment:     Visit Vitals    /59 (BP 1 Location: Right arm, BP Patient Position: At rest)    Pulse 71    Temp 98.2 °F (36.8 °C)    Resp 20    Ht 6' (1.829 m)    Wt 72.3 kg (159 lb 4.8 oz)    SpO2 97%    BMI 21.6 kg/m2     Last 3 Recorded Weights in this Encounter    12/18/17 1135 12/18/17 2352   Weight: 72.6 kg (160 lb) 72.3 kg (159 lb 4.8 oz)       Intake/Output Summary (Last 24 hours) at 12/19/17 1626  Last data filed at 12/19/17 1417   Gross per 24 hour   Intake              240 ml   Output                0 ml   Net              240 ml       Physial Exam:  General appearance: alert, cooperative, no distress, appears stated age  Skin: no rashes  HEENT: non icteric, pinkish conj  Neck: No JVD  Lungs: dec breath sound both bases and end exp wheezing bilaterally  Heart: regular rate and rhythm, S1, S2 normal, no murmur, click, rub or gallop  Abdomen: soft, non-tender. Bowel sounds normal. No masses,  no organomegaly  Extremities: Bilateral AKA    IMPRESSION AND PLAN:   CKD stage 5 most likely from DM and HTN, patient remains relatively asymp given his renal function. Would certainly recommend access placement once he is over this acute episode and he seems to agree with it. Will cont conservative management for the time being. Avoid any nephrotoxic drugs and adjust meds to renal function. Agree with holding the demadex for now and trend I/O closely. No signs of fluid overload.  Cont with sodium bicarb, IVF for another 24 H and encourage po fluids. 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 Cont calcitriol and phoslo  Fever defer management to primary team     Thank you will follow with you.     Huma David MD  December 19, 2017

## 2017-12-19 NOTE — H&P
HISTORY & PHYSICAL            Patient: Nathanial Blizzard MRN: [id number]  [facility]: [id number]    YOB: 1935  Age: 80 y.o. Sex: male    DOA: 12/18/2017 LOS:  LOS: 1 day        DOA: 12/18/2017        Assessment/Plan     Active Problems:    Renal failure (12/18/2017)      Anemia (12/18/2017)      Febrile illness (12/18/2017)      Community acquired pneumonia (12/19/2017)        Plan:  1. Renal failure - worsening creatinine - will check renal US to r/o obstruction - IVF , Nephrology consult in AM, stop demadex   2. Cough with SOB & productive phlegm -? CAP -- IV Abx - Rocephin & Zithromax   3. Anemia - chronic - likely related to CKD - has been transfused in the past -- will monitor H&H & keep goal 8-9  4. Heme occult mildly positive in the ER -- monitor H&H - consult GI in AM for further eval  5. H/o Bilateral AKA - 2y to PVD - stump pain - percocet prn   6. H/o HTN - continue home meds, monitor BP   7. H/o HLPD - continue Lipitor   8. Continue other home meds   DVT PX - SCD   Full code           Severity of Signs & Symptoms -- moderate  Risk of adverse events -- High  Current Medical Rx Plan - As Above   Patient history & comorbidities - Per HPI  Discharge Plan -- Home            HPI:     Nathanial Blizzard is a 80 y.o. male who has multiple medical problems Acute on chronic kidney disease, HTN , HLPD, PVD , chronic Anemia  is being admitted to the hosp for further eval of cough , SOB , ARF & Anemia. Pt presented to [facility] ER with c/o productive cough , SOB -- says he has had a cough for the past 3 days not getting better with greenish sputum. He doesn't smoke.    He was also noted to have anemia with mildly positive heme occult -- Labs show elevated creatinine - worsening RF --   Will hydrate with IVF , monitor Hgb & start Abx for possible CAP   Admit to tele for further eval.     Past Medical History:   Diagnosis Date    CAD (coronary artery disease)     Chronic combined systolic and diastolic heart failure ([facility] 3/17/2015    stable limited activity recent admsission     Chronic kidney disease, unspecified 3/17/2015    not on acei/arb     Coronary atherosclerosis of native coronary artery 3/17/2015    abnormal stress test medically managed no angina     Diabetes ([facility]     Diabetes mellitus     Essential hypertension     Essential hypertension, benign     On Hydralazine, Labetalol, clonidine and norvasc HX of ARF and hyperkalemia with ARB; stable    High cholesterol     Hypertension     Other and unspecified hyperlipidemia     On Zocor and Niaspan    Other primary cardiomyopathies 3/17/2015    ef 45%     Peripheral vascular disease, unspecified     Bilateral AKA    Peripheral vascular disease, unspecified     Bilateral AKA     Reflux     Renal insufficiency        Past Surgical History:   Procedure Laterality Date    HX ORTHOPAEDIC      double amputation,  hip replacement    HX PROSTATECTOMY         Family History   Problem Relation Age of Onset    Diabetes Mother     Heart Disease Mother     Cancer Other        Social History     Social History    Marital status:      Spouse name: N/A    Number of children: N/A    Years of education: N/A     Social History Main Topics    Smoking status: Former Smoker     Packs/day: 2.00     Quit date: 2/27/1998    Smokeless tobacco: Never Used    Alcohol use No    Drug use: No    Sexual activity: Not Asked     Other Topics Concern    None     Social History Narrative       Prior to Admission medications    Medication Sig Start Date End Date Taking? Authorizing Provider   sodium bicarbonate 650 mg tablet Take  by mouth two (2) times a day. 2 tabs   Yes Historical Provider   isosorbide mononitrate ER (IMDUR) 30 mg tablet Take  by mouth daily. Yes Historical Provider   calcium-vitamin D (OYSTER SHELL CALCIUM-VIT D3) 500 mg(1,250mg) -200 unit per tablet Take 1 Tab by mouth two (2) times daily (with meals).    Yes Historical Provider   torsemide (DEMADEX) 20 mg tablet Take  by mouth daily. 2 tabs every other day   Yes Historical Provider   glucose 4 gram chewable tablet Take 15 g by mouth as needed. 1-5 tabs as needed   Yes Historical Provider   oxyCODONE-acetaminophen (PERCOCET 10)  mg per tablet Take 1 Tab by mouth. Yes Historical Provider   calcium acetate (PHOSLO) 667 mg cap Take  by mouth three (3) times daily (with meals). Yes Historical Provider   calcitRIOL (ROCALTROL) 0.25 mcg capsule Take 0.25 mcg by mouth daily. Yes Historical Provider   timolol (TIMOPTIC) 0.5 % ophthalmic solution 1 Drop two (2) times a day. Yes Historical Provider   hydrALAZINE (APRESOLINE) 10 mg tablet Take 10 mg by mouth. 2 1/2 tabs twice a day   Yes Historical Provider   cloNIDine (CATAPRESS) 0.3 mg tablet Take 0.2 mg by mouth two (2) times a day. Yes Gwendolyn Mendez MD   carvedilol (COREG) 6.25 mg tablet Take 12.5 mg by mouth two (2) times daily (with meals). Yes Gwendolyn Other, MD   levobunolol (BETAGAN) 0.5 % ophthalmic solution Administer 1 Drop to both eyes nightly. Yes Historical Provider   insulin glargine (LANTUS) 100 unit/mL injection by SubCUTAneous route once. Yes Historical Provider   atorvastatin (LIPITOR) 10 mg tablet Take  by mouth daily. Yes Historical Provider   aspirin delayed-release 81 mg tablet Take  by mouth daily. Yes Historical Provider   insulin lispro (HUMALOG) 100 unit/mL injection by SubCUTAneous route. Yes Historical Provider   amLODIPine (NORVASC) 10 mg tablet Take  by mouth daily. Yes Historical Provider   ergocalciferol (ERGOCALCIFEROL) 50,000 unit capsule Take 50,000 Units by mouth. 2 times per week    Historical Provider       Allergies   Allergen Reactions    Vancomycin Vertigo       Review of Systems  A comprehensive review of systems was negative except for that written in the History of Present Illness.              Physical Exam:      Visit Vitals    /70 (BP 1 Location: Right arm, BP Patient Position: At rest)    Pulse 92    Temp (!) 102.4 °F (39.1 °C)    Resp 18    Ht 6' (1.829 m)    Wt 72.3 kg (159 lb 4.8 oz)    SpO2 97%    BMI 21.6 kg/m2       Physical Exam:    Gen: In general, this is a well nourished male in no acute distress  HEENT: Sclerae nonicteric. Oral mucous membranes moist. Dentition normal  Neck: Supple with midline trachea. CV: RRR without murmur or rub appreciated. Resp:Respirations are unlabored without use of accessory muscles. Decreased BS in bases. Abd: Soft, nontender, nondistended. Extrem: Extremities are warm, without cyanosis or clubbing. [place] AKA. Skin: Warm, no visible rashes. Neuro: Patient is alert, oriented, and cooperative. No obvious focal defects. Labs Reviewed:    Recent Results (from the past 24 hour(s))   INFLUENZA A & B AG (RAPID TEST)    Collection Time: 12/18/17 12:05 PM   Result Value Ref Range    Influenza A Antigen NEGATIVE  NEG      Influenza B Antigen NEGATIVE  NEG     CBC WITH AUTOMATED DIFF    Collection Time: 12/18/17 12:40 PM   Result Value Ref Range    WBC 7.1 4.6 - 13.2 K/uL    RBC 2.85 (L) 4.70 - 5.50 M/uL    HGB 7.5 (L) 13.0 - 16.0 g/dL    HCT 25.0 (L) 36.0 - 48.0 %    MCV 87.7 74.0 - 97.0 FL    MCH 26.3 24.0 - 34.0 PG    MCHC 30.0 (L) 31.0 - 37.0 g/dL    RDW 15.5 (H) 11.6 - 14.5 %    PLATELET 325 222 - 556 K/uL    MPV 10.0 9.2 - 11.8 FL    NEUTROPHILS 67 40 - 73 %    LYMPHOCYTES 13 (L) 21 - 52 %    MONOCYTES 15 (H) 3 - 10 %    EOSINOPHILS 4 0 - 5 %    BASOPHILS 1 0 - 2 %    ABS. NEUTROPHILS 4.8 1.8 - 8.0 K/UL    ABS. LYMPHOCYTES 1.0 0.9 - 3.6 K/UL    ABS. MONOCYTES 1.1 0.05 - 1.2 K/UL    ABS. EOSINOPHILS 0.3 0.0 - 0.4 K/UL    ABS.  BASOPHILS 0.0 0.0 - 0.06 K/UL    DF AUTOMATED     METABOLIC PANEL, BASIC    Collection Time: 12/18/17 12:40 PM   Result Value Ref Range    Sodium 138 136 - 145 mmol/L    Potassium 4.3 3.5 - 5.5 mmol/L    Chloride 103 100 - 108 mmol/L    CO2 25 21 - 32 mmol/L    Anion gap 10 3.0 - 18 mmol/L    Glucose 288 (H) 74 - 99 mg/dL    BUN 91 (H) 7.0 - 18 MG/DL    Creatinine 6.65 (H) 0.6 - 1.3 MG/DL    BUN/Creatinine ratio 14 12 - 20      GFR est AA 10 (L) >60 ml/min/1.73m2    GFR est non-AA 8 (L) >60 ml/min/1.73m2    Calcium 7.5 (L) 8.5 - 10.1 MG/DL   EKG, 12 LEAD, INITIAL    Collection Time: 12/18/17 12:45 PM   Result Value Ref Range    Ventricular Rate 96 BPM    Atrial Rate 96 BPM    P-R Interval 182 ms    QRS Duration 92 ms    Q-T Interval 364 ms    QTC Calculation (Bezet) 459 ms    Calculated P Axis 52 degrees    Calculated T Axis 105 degrees    Diagnosis       Normal sinus rhythm  T wave abnormality, consider lateral ischemia  Abnormal ECG  When compared with ECG of 08-JUL-2011 10:06,  No significant change was found  Confirmed by Alexis Moon ([id number]) on 12/18/2017 8:26:55 PM     TROPONIN I    Collection Time: 12/18/17 12:47 PM   Result Value Ref Range    Troponin-I, Qt. 0.03 0.00 - 0.06 NG/ML   URINALYSIS W/ RFLX MICROSCOPIC    Collection Time: 12/18/17 12:50 PM   Result Value Ref Range    Color YELLOW      Appearance CLOUDY      Specific gravity 1.012 1.005 - 1.030      pH (UA) 7.5 5.0 - 8.0      Protein 100 (A) NEG mg/dL    Glucose 500 (A) NEG mg/dL    Ketone NEGATIVE  NEG mg/dL    Bilirubin NEGATIVE  NEG      Blood NEGATIVE  NEG      Urobilinogen 0.2 0.2 - 1.0 EU/dL    Nitrites NEGATIVE  NEG      Leukocyte Esterase NEGATIVE  NEG     URINE MICROSCOPIC ONLY    Collection Time: 12/18/17 12:50 PM   Result Value Ref Range    WBC 0 to 3 0 - 4 /hpf    RBC 0 to 3 0 - 5 /hpf   TYPE & SCREEN    Collection Time: 12/18/17  1:10 PM   Result Value Ref Range    Crossmatch Expiration 12/21/2017     ABO/Rh(D) [place]y POSITIVE     Antibody screen NEG    POC LACTIC ACID    Collection Time: 12/18/17  1:40 PM   Result Value Ref Range    Lactic Acid (POC) 0.6 0.4 - 2.0 mmol/L   CBC WITH AUTOMATED DIFF    Collection Time: 12/18/17  7:51 PM   Result Value Ref Range    WBC 6.7 4.6 - 13.2 K/uL    RBC 2.98 (L) 4.70 - 5.50 M/uL    HGB 7.9 (L) 13.0 - 16.0 g/dL    HCT 26.3 (L) 36.0 - 48.0 %    MCV 88.3 74.0 - 97.0 FL    MCH 26.5 24.0 - 34.0 PG    MCHC 30.0 (L) 31.0 - 37.0 g/dL    RDW 15.5 (H) 11.6 - 14.5 %    PLATELET 162 713 - 571 K/uL    MPV 9.6 9.2 - 11.8 FL    NEUTROPHILS 60 40 - 73 %    LYMPHOCYTES 18 (L) 21 - 52 %    MONOCYTES 19 (H) 3 - 10 %    EOSINOPHILS 3 0 - 5 %    BASOPHILS 0 0 - 2 %    ABS. NEUTROPHILS 4.0 1.8 - 8.0 K/UL    ABS. LYMPHOCYTES 1.2 0.9 - 3.6 K/UL    ABS. MONOCYTES 1.3 (H) 0.05 - 1.2 K/UL    ABS. EOSINOPHILS 0.2 0.0 - 0.4 K/UL    ABS.  BASOPHILS 0.0 0.0 - 0.06 K/UL    DF AUTOMATED         Imaging Reviewed:    CXR Reviewed           Manisha Auguste MD  12/19/2017, 12:58 AM

## 2017-12-19 NOTE — DIABETES MGMT
Diabetes Patient/Family Education Record    Factors That  May Influence Patients Ability  to Learn or  Comply with Recommendations   []   Language barrier    []   Cultural needs   [x]   Motivation    []   Cognitive limitation    []   Physical   [x]   Education    []   Physiological factors   []   Hearing/vision/speaking impairment   []   Roman Catholic beliefs    []   Financial factors   []  Other:   []  No factors identified at this time.      Person Instructed:   [x]   Patient   []   Family   []  Other     Preference for Learning:   [x]   Verbal   [x]   Written   []  Demonstration     Level of Comprehension & Competence:    []  Good                                      [x] Fair                                     []  Poor                             [x]  Needs Reinforcement   [x]  Teachback completed    Education Component:   [x]  Medication management,    [x]  Nutritional management    []  Exercise   []  Signs, symptoms, and treatment of hyperglycemia and hypoglycemia   [] Prevention, recognition and treatment of hyperglycemia and hypoglycemia   [x]  Importance of blood glucose monitoring and how to obtain a blood glucose meter    []  Instruction on use of the blood glucose meter   [x]  Discuss the importance of HbA1C monitoring    []  Sick day guidelines   []  Proper use and disposal of lancets, needles, syringes or insulin pens (if appropriate)   [x]  Potential long-term complications (retinopathy, kidney disease, neuropathy, foot care)   [x] Information about whom to contact in case of emergency or for more information    [x]  Goal:  Patient/family will demonstrate understanding of Diabetes Self Management Skills by: (date) _12/26/17______  Plan for post-discharge education or self-management support:    [] Outpatient class schedule provided            [x] Patient Declined    [] Scheduled for outpatient classes (date) _______     Latha Vick MS, [address]  Pager: [phone]

## 2017-12-19 NOTE — PROGRESS NOTES
Hospitalist Progress Note    Patient: Jordy Aguilar MRN: [id number]  CSN: [id number]    YOB: 1935  Age: 80 y.o. Sex: male    DOA: 12/18/2017 LOS:  LOS: 1 day          Total 100 mL uop this shift. Bladder scan 11 mL. He reports his cough has improved a bit. Breathing okay, he is not on oxygen at home. He is not on home oxygen. Assessment/Plan     1. 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 Strict I/o. UA and renal US negative. Nephrology to consult. 2. Acute bronchitis - blood cx (12/18) ngtd. Sputum cx. On ceftri / azithro. 3. Advanced age - consult SLP. 4. 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 Vernell Núñez in the past.   5. Proteinuria  6. Anemia of ckd - on epogen as outpatient per Dr. Renato Vasquez  7. Iron deficiency anemia on venofer as outpatient per Dr. Renato Vasquez  8. DM2 - lantus / ssi / ADA diet. Check A1c.   9. NAGMA in the setting of ARF. On home bicarb tabs  10. Hypertension fairly well controlled. 11. Echo 2/14/17 EF 55%, moderate LVH, mild DD. Mild MR. Trace TR with nl PA pressure. Mild CT. No change from previous 10/28/16. 12. Admitted June 2017 for acute on chronic diastolic CHF - he was discharge on torsemide on alternate days. 13. B.L bka. Hip replacement 2000. 14. ? Coronary artery disease with abnormal stress test in past - follows w/ Dr. Kayden Piña  15. dvt prophylaxis  16. Full code. Pt and ot. I discussed the case with Dr. Delgado Nelson.      Additional Notes:      Case discussed with:  [x]Patient  []Family  [x]Nursing  []Case Management  DVT Prophylaxis:  []Lovenox  [x]Hep SQ  []SCDs  []Coumadin   []On Heparin gtt    Vital signs/Intake and Output:  Visit Vitals    /63 (BP 1 Location: Right arm, BP Patient Position: At rest)    Pulse 82    Temp 99 °F (37.2 °C)    Resp 18    Ht 6' (1.829 m)    Wt 72.3 kg (159 lb 4.8 oz)    SpO2 98%    BMI 21.6 kg/m2     Current Shift:     Last three shifts:       Awake alert and oriented. Ncat. Perrl. RRR  Coarse rhonchi at bases. No wheeze. Soft nt nd nabs  B.L bka, sites clean. No focal deficit  No rash    Medications Reviewed      Labs: Results:       Chemistry Recent Labs      12/18/17   1240   GLU  288*   NA  138   K  4.3   CL  103   CO2  25   BUN  91*   CREA  6.65*   CA  7.5*   AGAP  10   BUCR  14      CBC w/Diff Recent Labs      12/18/17   1951  12/18/17   1240   WBC  6.7  7.1   RBC  2.98*  2.85*   HGB  7.9*  7.5*   HCT  26.3*  25.0*   PLT  205  217   GRANS  60  67   LYMPH  18*  13*   EOS  3  4      Cardiac Enzymes No results for input(s): CPK, CKND1, ROSA in the last 72 hours. No lab exists for component: CKRMB, TROIP   Coagulation No results for input(s): PTP, INR, APTT in the last 72 hours. No lab exists for component: INREXT    Lipid Panel Lab Results   Component Value Date/Time    Cholesterol, total 133 09/01/2010 11:15 AM    HDL Cholesterol 46 09/01/2010 11:15 AM    LDL, calculated 50 09/01/2010 11:15 AM    VLDL, calculated 37 09/01/2010 11:15 AM    Triglyceride 185 09/01/2010 11:15 AM    CHOL/HDL Ratio 2.9 09/01/2010 11:15 AM      BNP No results for input(s): BNPP in the last 72 hours. Liver Enzymes No results for input(s): TP, ALB, TBIL, AP, SGOT, GPT in the last 72 hours. No lab exists for component: DBIL   Thyroid Studies Lab Results   Component Value Date/Time    TSH 1.94 09/01/2010 11:15 AM        Procedures/imaging: see electronic medical records for all procedures/Xrays and details which were not copied into this note but were reviewed prior to creation of Plan.

## 2017-12-20 ENCOUNTER — APPOINTMENT (OUTPATIENT)
Dept: GENERAL RADIOLOGY | Age: 82
DRG: 871 | End: 2017-12-20
Attending: INTERNAL MEDICINE
Payer: MEDICARE

## 2017-12-20 LAB
ANION GAP SERPL CALC-SCNC: 11 MMOL/L (ref 3–18)
BASOPHILS # BLD: 0 K/UL (ref 0–0.1)
BASOPHILS NFR BLD: 0 % (ref 0–2)
BUN SERPL-MCNC: 80 MG/DL (ref 7–18)
BUN/CREAT SERPL: 12 (ref 12–20)
CALCIUM SERPL-MCNC: 6.1 MG/DL (ref 8.5–10.1)
CHLORIDE SERPL-SCNC: 105 MMOL/L (ref 100–108)
CHOLEST SERPL-MCNC: 78 MG/DL
CO2 SERPL-SCNC: 21 MMOL/L (ref 21–32)
CREAT SERPL-MCNC: 6.54 MG/DL (ref 0.6–1.3)
DIFFERENTIAL METHOD BLD: ABNORMAL
EOSINOPHIL # BLD: 0.3 K/UL (ref 0–0.4)
EOSINOPHIL NFR BLD: 6 % (ref 0–5)
ERYTHROCYTE [DISTWIDTH] IN BLOOD BY AUTOMATED COUNT: 15.4 % (ref 11.6–14.5)
EST. AVERAGE GLUCOSE BLD GHB EST-MCNC: 229 MG/DL
GLUCOSE BLD STRIP.AUTO-MCNC: 142 MG/DL (ref 70–110)
GLUCOSE BLD STRIP.AUTO-MCNC: 187 MG/DL (ref 70–110)
GLUCOSE BLD STRIP.AUTO-MCNC: 278 MG/DL (ref 70–110)
GLUCOSE BLD STRIP.AUTO-MCNC: 282 MG/DL (ref 70–110)
GLUCOSE SERPL-MCNC: 159 MG/DL (ref 74–99)
HBA1C MFR BLD: 9.6 % (ref 4.2–5.6)
HCT VFR BLD AUTO: 23.4 % (ref 36–48)
HDLC SERPL-MCNC: 35 MG/DL (ref 40–60)
HDLC SERPL: 2.2 {RATIO} (ref 0–5)
HGB BLD-MCNC: 7.6 G/DL (ref 13–16)
LDLC SERPL CALC-MCNC: 25.2 MG/DL (ref 0–100)
LIPID PROFILE,FLP: ABNORMAL
LYMPHOCYTES # BLD: 1.2 K/UL (ref 0.9–3.6)
LYMPHOCYTES NFR BLD: 25 % (ref 21–52)
MAGNESIUM SERPL-MCNC: 1.7 MG/DL (ref 1.6–2.6)
MCH RBC QN AUTO: 27.7 PG (ref 24–34)
MCHC RBC AUTO-ENTMCNC: 32.5 G/DL (ref 31–37)
MCV RBC AUTO: 85.4 FL (ref 74–97)
MONOCYTES # BLD: 0.9 K/UL (ref 0.05–1.2)
MONOCYTES NFR BLD: 19 % (ref 3–10)
NEUTS SEG # BLD: 2.3 K/UL (ref 1.8–8)
NEUTS SEG NFR BLD: 50 % (ref 40–73)
PLATELET # BLD AUTO: 200 K/UL (ref 135–420)
PMV BLD AUTO: 10.7 FL (ref 9.2–11.8)
POTASSIUM SERPL-SCNC: 4.7 MMOL/L (ref 3.5–5.5)
RBC # BLD AUTO: 2.74 M/UL (ref 4.7–5.5)
SODIUM SERPL-SCNC: 137 MMOL/L (ref 136–145)
TRIGL SERPL-MCNC: 89 MG/DL (ref ?–150)
VLDLC SERPL CALC-MCNC: 17.8 MG/DL
WBC # BLD AUTO: 4.7 K/UL (ref 4.6–13.2)

## 2017-12-20 PROCEDURE — 71010 XR CHEST PORT: CPT

## 2017-12-20 PROCEDURE — 74011636637 HC RX REV CODE- 636/637: Performed by: INTERNAL MEDICINE

## 2017-12-20 PROCEDURE — 36415 COLL VENOUS BLD VENIPUNCTURE: CPT | Performed by: HOSPITALIST

## 2017-12-20 PROCEDURE — 74011250636 HC RX REV CODE- 250/636: Performed by: HOSPITALIST

## 2017-12-20 PROCEDURE — 85025 COMPLETE CBC W/AUTO DIFF WBC: CPT | Performed by: HOSPITALIST

## 2017-12-20 PROCEDURE — 82962 GLUCOSE BLOOD TEST: CPT

## 2017-12-20 PROCEDURE — 97161 PT EVAL LOW COMPLEX 20 MIN: CPT

## 2017-12-20 PROCEDURE — 74011250637 HC RX REV CODE- 250/637: Performed by: HOSPITALIST

## 2017-12-20 PROCEDURE — 83735 ASSAY OF MAGNESIUM: CPT | Performed by: HOSPITALIST

## 2017-12-20 PROCEDURE — 65660000000 HC RM CCU STEPDOWN

## 2017-12-20 PROCEDURE — 92526 ORAL FUNCTION THERAPY: CPT

## 2017-12-20 PROCEDURE — 80061 LIPID PANEL: CPT | Performed by: HOSPITALIST

## 2017-12-20 PROCEDURE — 83036 HEMOGLOBIN GLYCOSYLATED A1C: CPT | Performed by: HOSPITALIST

## 2017-12-20 PROCEDURE — 80048 BASIC METABOLIC PNL TOTAL CA: CPT | Performed by: HOSPITALIST

## 2017-12-20 PROCEDURE — 87040 BLOOD CULTURE FOR BACTERIA: CPT | Performed by: INTERNAL MEDICINE

## 2017-12-20 PROCEDURE — 74011636637 HC RX REV CODE- 636/637: Performed by: HOSPITALIST

## 2017-12-20 PROCEDURE — 83883 ASSAY NEPHELOMETRY NOT SPEC: CPT | Performed by: INTERNAL MEDICINE

## 2017-12-20 PROCEDURE — 74011250637 HC RX REV CODE- 250/637: Performed by: INTERNAL MEDICINE

## 2017-12-20 PROCEDURE — 74011000250 HC RX REV CODE- 250: Performed by: HOSPITALIST

## 2017-12-20 RX ORDER — CALCITRIOL 0.25 UG/1
0.5 CAPSULE ORAL DAILY
Status: DISCONTINUED | OUTPATIENT
Start: 2017-12-21 | End: 2017-12-22 | Stop reason: HOSPADM

## 2017-12-20 RX ORDER — INSULIN LISPRO 100 [IU]/ML
3 INJECTION, SOLUTION INTRAVENOUS; SUBCUTANEOUS
Status: DISCONTINUED | OUTPATIENT
Start: 2017-12-20 | End: 2017-12-22 | Stop reason: HOSPADM

## 2017-12-20 RX ADMIN — INSULIN GLARGINE 10 UNITS: 100 INJECTION, SOLUTION SUBCUTANEOUS at 17:06

## 2017-12-20 RX ADMIN — INSULIN LISPRO 6 UNITS: 100 INJECTION, SOLUTION INTRAVENOUS; SUBCUTANEOUS at 11:58

## 2017-12-20 RX ADMIN — ISOSORBIDE MONONITRATE 30 MG: 30 TABLET, EXTENDED RELEASE ORAL at 08:35

## 2017-12-20 RX ADMIN — INSULIN LISPRO 3 UNITS: 100 INJECTION, SOLUTION INTRAVENOUS; SUBCUTANEOUS at 17:06

## 2017-12-20 RX ADMIN — HEPARIN SODIUM 5000 UNITS: 5000 INJECTION, SOLUTION INTRAVENOUS; SUBCUTANEOUS at 08:35

## 2017-12-20 RX ADMIN — OXYCODONE HYDROCHLORIDE AND ACETAMINOPHEN 1 TABLET: 5; 325 TABLET ORAL at 12:04

## 2017-12-20 RX ADMIN — HEPARIN SODIUM 5000 UNITS: 5000 INJECTION, SOLUTION INTRAVENOUS; SUBCUTANEOUS at 17:06

## 2017-12-20 RX ADMIN — TIMOLOL MALEATE 1 DROP: 5 SOLUTION OPHTHALMIC at 08:40

## 2017-12-20 RX ADMIN — HYDRALAZINE HYDROCHLORIDE 10 MG: 10 TABLET, FILM COATED ORAL at 08:35

## 2017-12-20 RX ADMIN — HYDRALAZINE HYDROCHLORIDE 10 MG: 10 TABLET, FILM COATED ORAL at 17:05

## 2017-12-20 RX ADMIN — WATER 1 G: 1 INJECTION INTRAMUSCULAR; INTRAVENOUS; SUBCUTANEOUS at 06:11

## 2017-12-20 RX ADMIN — Medication 400 MG: at 08:35

## 2017-12-20 RX ADMIN — CALCIUM ACETATE 667 MG: 667 CAPSULE ORAL at 17:05

## 2017-12-20 RX ADMIN — SODIUM BICARBONATE 650 MG TABLET 325 MG: at 17:05

## 2017-12-20 RX ADMIN — HEPARIN SODIUM 5000 UNITS: 5000 INJECTION, SOLUTION INTRAVENOUS; SUBCUTANEOUS at 01:51

## 2017-12-20 RX ADMIN — CLONIDINE HYDROCHLORIDE 0.2 MG: 0.1 TABLET ORAL at 08:35

## 2017-12-20 RX ADMIN — SODIUM BICARBONATE 650 MG TABLET 325 MG: at 08:35

## 2017-12-20 RX ADMIN — CALCIUM ACETATE 667 MG: 667 CAPSULE ORAL at 08:35

## 2017-12-20 RX ADMIN — TIMOLOL MALEATE 1 DROP: 5 SOLUTION OPHTHALMIC at 17:07

## 2017-12-20 RX ADMIN — CALCITRIOL 0.25 MCG: 0.25 CAPSULE, LIQUID FILLED ORAL at 08:35

## 2017-12-20 RX ADMIN — INSULIN LISPRO 2 UNITS: 100 INJECTION, SOLUTION INTRAVENOUS; SUBCUTANEOUS at 22:45

## 2017-12-20 RX ADMIN — CARVEDILOL 12.5 MG: 12.5 TABLET, FILM COATED ORAL at 17:05

## 2017-12-20 RX ADMIN — CLONIDINE HYDROCHLORIDE 0.2 MG: 0.1 TABLET ORAL at 17:05

## 2017-12-20 RX ADMIN — OYSTER SHELL CALCIUM WITH VITAMIN D 1 TABLET: 500; 200 TABLET, FILM COATED ORAL at 08:35

## 2017-12-20 RX ADMIN — CARVEDILOL 12.5 MG: 12.5 TABLET, FILM COATED ORAL at 08:34

## 2017-12-20 RX ADMIN — HYDRALAZINE HYDROCHLORIDE 10 MG: 10 TABLET, FILM COATED ORAL at 22:47

## 2017-12-20 RX ADMIN — CALCIUM ACETATE 667 MG: 667 CAPSULE ORAL at 11:58

## 2017-12-20 RX ADMIN — Medication 400 MG: at 17:05

## 2017-12-20 RX ADMIN — ASPIRIN 81 MG: 81 TABLET ORAL at 08:35

## 2017-12-20 RX ADMIN — AMLODIPINE BESYLATE 10 MG: 10 TABLET ORAL at 08:35

## 2017-12-20 RX ADMIN — ATORVASTATIN CALCIUM 10 MG: 10 TABLET, FILM COATED ORAL at 08:35

## 2017-12-20 RX ADMIN — INSULIN LISPRO 6 UNITS: 100 INJECTION, SOLUTION INTRAVENOUS; SUBCUTANEOUS at 17:06

## 2017-12-20 NOTE — PROGRESS NOTES
When  attempted to visit patient it was not a good time to visit. Patient was resting. Chaplains will provide spiritual care as needed, as requested. Khoa Gilbert MDiv.   Board Certified [employer] [phone]

## 2017-12-20 NOTE — PROGRESS NOTES
Problem: Mobility Impaired (Adult and Pediatric)  Goal: *Acute Goals and Plan of Care (Insert Text)  Patient presents to PT at functional baseline. No further acute PT indicated at present time. Will discharge PT. Outcome: Resolved/Met Date Met: 12/20/17  physical Therapy EVALUATION & Discharge    Patient: Iliana Bhatia (13 y.o. male)  Date: 12/20/2017  Primary Diagnosis: Febrile illness  Anemia  Renal failure  Anemia  Renal failure  Febrile illness  Community acquired pneumonia  Precautions:   Fall, Skin (B AKA)    ASSESSMENT AND RECOMMENDATIONS:  Based on the objective data described below, the patient presents to PT at functional baseline with level of activity. Patient is s/p B AKA ~ 12 yrs, reports that he has prosthetic LEs, however, he states that he no longer uses them and utilizes a power wc or manual wc for primary means of mobility. Patient reports that he requires assistance with ADLs PTA, has an aide daily from 9-3:30pm.  Patient states that he typically transfers himself in/out of wc from level surfaces, however, pt states he does have a slideboard that he utilizes for car transfers. Today, patient continues to demonstrate modified independence with bed mobility. Patient's power wc not currently at bedside, however, patient reports that he has had no difficulty with transfers. Patient reports that he is at his baseline. No further acute PT indicated at present time. Will discharge PT. Skilled physical therapy is not indicated at this time. Discharge Recommendations: None  Further Equipment Recommendations for Discharge: N/A      G-:CODES     Mobility  Current  CI= 1-19%   Goal  CI= 1-19%  D/C  CI= 1-19%. The severity rating is based on the Level of Assistance required for Functional Mobility and ADLs.       Evaluation Complexity     Eval Complexity: History: MEDIUM  Complexity : 1-2 comorbidities / personal factors will impact the outcome/ POC Exam:LOW Complexity : 1-2 Standardized tests and measures addressing body structure, function, activity limitation and / or participation in recreation  Presentation: LOW Complexity : Stable, uncomplicated  Overall Complexity:LOW     SUBJECTIVE:   Patient stated I am doing okay.     OBJECTIVE DATA SUMMARY:     Past Medical History:   Diagnosis Date    CAD (coronary artery disease)     Chronic combined systolic and diastolic heart failure ([facility] 3/17/2015    stable limited activity recent admsission     Chronic kidney disease, unspecified 3/17/2015    not on acei/arb     Coronary atherosclerosis of native coronary artery 3/17/2015    abnormal stress test medically managed no angina     Diabetes ([facility]     Diabetes mellitus     Essential hypertension     Essential hypertension, benign     On Hydralazine, Labetalol, clonidine and norvasc HX of ARF and hyperkalemia with ARB; stable    High cholesterol     Hypertension     Other and unspecified hyperlipidemia     On Zocor and Niaspan    Other primary cardiomyopathies 3/17/2015    ef 45%     Peripheral vascular disease, unspecified     Bilateral AKA    Peripheral vascular disease, unspecified     Bilateral AKA     Reflux     Renal insufficiency      Past Surgical History:   Procedure Laterality Date    HX ORTHOPAEDIC      double amputation,  hip replacement    HX PROSTATECTOMY       Barriers to Learning/Limitations: None  Compensate with: N/A  Prior Level of Function/Home Situation:   Home Situation  Home Environment: Apartment (5th floor, elevator to enter)  # Steps to Enter: 0  One/Two Story Residence: One story  Living Alone: No  Support Systems: Spouse/Significant Other/Partner, Home care staff  Patient Expects to be Discharged to[de-identified] Apartment  Current DME Used/Available at Home: Wheelchair, power, Wheelchair, Other (comment) (has trapezdominguez)  Critical Behavior:  Neurologic State: Alert; Appropriate for age  Orientation Level: Appropriate for age;Oriented X4  Cognition: Appropriate decision making; Appropriate for age attention/concentration; Appropriate safety awareness; Follows commands  Safety/Judgement: Awareness of environment; Fall prevention  Psychosocial  Patient Behaviors: Calm; Cooperative  Purposeful Interaction: Yes  Pt Identified Daily Priority: Clinical issues (comment)  Caritas Process: Nurture loving kindness; Teaching/learning; Attend basic human needs;Create healing environment;Supportive expression;Establish trust  Caring Interventions: Reassure; Therapeutic modalities  Reassure: Therapeutic listening; Informing;Caring rounds  Therapeutic Modalities: Intentional therapeutic touch; Deep breathing  Strength:    Strength: Within functional limits (B LE)  Tone & Sensation:   Tone: Normal (B LE)  Sensation: Intact (B LE)  Range Of Motion:  AROM: Within functional limits (B LE for residual limbs)  PROM: Within functional limits (B LE for residual limbs)  Functional Mobility:  Bed Mobility:  Rolling: Modified independent  Supine to Sit: Modified independent  Sit to Supine: Modified independent  Scooting: Modified independent  Transfers:  Sit to Stand:  (N/A-pt transfers only to wc)  Balance:   Sitting: Intact  Standing:  (N/A)  Pain:  Pt reports 0/10 pain or discomfort prior to treatment.    Pt reports 0/10 pain or discomfort post treatment. Activity Tolerance:   Good  Please refer to the flowsheet for vital signs taken during this treatment. After treatment:   []         Patient left in no apparent distress sitting up in chair  [x]         Patient left in no apparent distress in bed  []         Call bell left within reach  []         Nursing notified   []         Caregiver present  []         Bed alarm activated  []         SCDs applied to B LE    COMMUNICATION/EDUCATION:   [x]         Fall prevention education was provided and the patient/caregiver indicated understanding. []         Patient/family have participated as able in goal setting and plan of care.   []         Patient/family agree to work toward stated goals and plan of care. []         Patient understands intent and goals of therapy, but is neutral about his/her participation. []         Patient is unable to participate in goal setting and plan of care.     Thank you for this referral.  Omari Márquez, PT   Time Calculation: 10 mins

## 2017-12-20 NOTE — PROGRESS NOTES
Hospitalist Progress Note    Patient: Katharine Seaman MRN: [id number]  CSN: [id number]    YOB: 1935  Age: 80 y.o. Sex: male    DOA: 12/18/2017 LOS:  LOS: 2 days          Called by nsg for blood cx + GPC in pairs and chains. Did well with SLP. He reports he overall feels much better than at admission. Cough improving, now productive of scant sputum. He is making about the same amount of urine. Denies chest pain, denies constipation. Appetite good. Assessment/Plan     1. 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 Strict I/o. UA and renal US negative. Nephrology to consult. 2. Acute bronchitis - blood cx (12/18) ngtd. Sputum cx. On ceftri / azithro. 3. sepsis (POA) due to gram positive bloodstream infection - abx per ID  4. 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 Kyleigh Sanz in the past.   5. Proteinuria  6. Anemia of ckd - on epogen as outpatient per Dr. Christine Chavez  7. Iron deficiency anemia on venofer as outpatient per Dr. Christine Chavez  8. DM2 - lantus / ssi / ADA diet. Check A1c.   9. NAGMA in the setting of ARF. On home bicarb tabs  10. Hypertension fairly well controlled. 11. Echo 2/14/17 EF 55%, moderate LVH, mild DD. Mild MR. Trace TR with nl PA pressure. Mild TN. No change from previous 10/28/16. 12. Admitted June 2017 for acute on chronic diastolic CHF - he was discharge on torsemide on alternate days. 13. B.L bka. Hip replacement 2000. 14. ? Coronary artery disease with abnormal stress test in past - follows w/ Dr. Noel Galloway  15. dvt prophylaxis  16. Full code. Pt and ot. I discussed the case with Dr. Christy Holbrook, and Dr. James Martell.     Girish Jeter: [phone]    Additional Notes:      Case discussed with:  [x]Patient  []Family  [x]Nursing  []Case Management  DVT Prophylaxis:  []Lovenox  [x]Hep SQ  []SCDs  []Coumadin   []On Heparin gtt    Vital signs/Intake and Output:  Visit Vitals    /59 (BP 1 Location: Right arm, BP Patient Position: At rest)    Pulse 81    Temp 99.4 °F (37.4 °C)    Resp 18    Ht 6' (1.829 m)    Wt 73.5 kg (162 lb)    SpO2 94%    BMI 21.97 kg/m2     Current Shift:  12/20 0701 - 12/20 1900  In: 20 [I.V.:20]  Out: -   Last three shifts:  12/18 1901 - 12/20 0700  In: 440 [P.O.:440]  Out: 50 [Urine:50]    Awake alert and oriented. Ncat. Perrl. RRR  Coarse rhonchi at bases. No wheeze. Soft nt nd nabs  B.L bka, sites clean. No focal deficit  No rash    Medications Reviewed      Labs: Results:       Chemistry Recent Labs      12/20/17   0315 12/19/17   1728  12/19/17   1050  12/18/17   1240   GLU  159*   --   184*  288*   NA  137   --   140  138   K  4.7   --   4.0  4.3   CL  105   --   107  103   CO2  21   --   20*  25   BUN  80*   --   78*  91*   CREA  6.54*   --   6.35*  6.65*   CA  6.1*  6.4*  6.3*  7.5*   AGAP  11   --   13  10   BUCR  12   --   12  14   ALB   --   2.4*   --    --       CBC w/Diff Recent Labs      12/20/17   0315  12/19/17   1050  12/18/17   1951   WBC  4.7  5.7  6.7   RBC  2.74*  2.80*  2.98*   HGB  7.6*  7.6*  7.9*   HCT  23.4*  24.4*  26.3*   PLT  200  195  205   GRANS  50  52  60   LYMPH  25  28  18*   EOS  6*  3  3      Cardiac Enzymes No results for input(s): CPK, CKND1, ROSA in the last 72 hours. No lab exists for component: CKRMB, TROIP   Coagulation No results for input(s): PTP, INR, APTT in the last 72 hours. No lab exists for component: INREXT, INREXT    Lipid Panel Lab Results   Component Value Date/Time    Cholesterol, total 78 12/20/2017 03:15 AM    HDL Cholesterol 35 12/20/2017 03:15 AM    LDL, calculated 25.2 12/20/2017 03:15 AM    VLDL, calculated 17.8 12/20/2017 03:15 AM    Triglyceride 89 12/20/2017 03:15 AM    CHOL/HDL Ratio 2.2 12/20/2017 03:15 AM      BNP No results for input(s): BNPP in the last 72 hours.    Liver Enzymes Recent Labs      12/19/17   1728   ALB  2.4*      Thyroid Studies Lab Results   Component Value Date/Time    TSH 1.94 09/01/2010 11:15 AM        Procedures/imaging: see electronic medical records for all procedures/Xrays and details which were not copied into this note but were reviewed prior to creation of Plan.

## 2017-12-20 NOTE — DIABETES MGMT
GLYCEMIC CONTROL PLAN OF CARE    Assessment/Recommendations:  Pt postprandial BG above target range. Recommend add 3 units lispro with meals. Continue 10 units lantus and very insulin resistant scale lispro ACHS. Pt reports eating >75% of meals and had eaten >75% of lunch at time of visit. Encouraged pt to work with  for meal preferences as pt did not like the soup given with lunch today. Will continue to monitor inpatient for intervention.     Recent Glucose Results:   Lab Results   Component Value Date/Time     (H) 12/20/2017 03:15 AM    GLUCPOC 282 (H) 12/20/2017 03:20 PM    GLUCPOC 278 (H) 12/20/2017 11:38 AM    GLUCPOC 142 (H) 12/20/2017 07:53 AM     Current hospital diabetes medications:  10 units lantus, very insulin resistant scale lispro ACHS    Previous day Insulin TDD:  12/19: 10 units lantus, 18 units lispro    Diet:   Diabetic consistent carbohydrate 5043-2419 kcal, 2 gram sodium, no conc sweets, house renal 70-70-70    Goals:  Pt BG will be within target range by _12/23/17____    Education:  _x__  Refer to Diabetes Education Record             ___  Education not indicated at this time    Maile Carney [address], [address] [place] [address], [facility] [place]  Pager: [phone]

## 2017-12-20 NOTE — PROGRESS NOTES
Problem: Dysphagia (Adult)  Goal: *Speech Goal: (INSERT TEXT)  Patient will:  1. Tolerate PO trials with 0 s/s overt distress in 4/5 trials  2. Utilize compensatory swallow strategies/maneuvers (decrease bite/sip, size/rate, alt. liq/sol) with min cues in 4/5 trials  3. Perform oral-motor/laryngeal exercises to increase oropharyngeal swallow function with min cues      Rec:     Regular diet with thin liquids  Aspiration precautions  HOB >45 during po intake, remain >30 for 30-45 minutes after po   Small bites/sips; alternate liquid/solid with slow feeding rate   Oral care TID  Meds per pt preference         Outcome: Progressing Towards Goal  Speech language pathology dysphagia treatment    Patient: Sherri Lundborg (3 y.o. male)  Date: 12/20/2017  Diagnosis: Febrile illness  Anemia  Renal failure  Anemia  Renal failure  Febrile illness  Community acquired pneumonia <principal problem not specified>       Precautions: aspiration      ASSESSMENT:  Pt was seen at bedside for f/u dysphagia management. Pt tolerating meds whole 1 @ time with thin liquid wash from RN, consecutive swallows, without any overt s/sx of aspiration. Pt also tolerated reg solid trial with positive oral clearance and no overt s/sx of aspiration. Delayed cough noted ~5 min post PO as well as at baseline prior to PO trials. Laryngeal elevation appeared functional and timely to palpation. Rec to continue reg solid diet with thin liquids, aspiration precautions, oral care TID, and meds as tolerated. Pt may benefit from [facility], per MD discretion, if silent aspiration is a concern. ST to f/u x 1 more visit to ensure safety of PO.      Progression toward goals:  []         Improving appropriately and progressing toward goals  [x]         Improving slowly and progressing toward goals  []         Not making progress toward goals and plan of care will be adjusted     PLAN:  Recommendations and Planned Interventions: See above  Patient continues to benefit from skilled intervention to address the above impairments. Continue treatment per established plan of care. Discharge Recommendations:  Outpatient and To Be Determined     SUBJECTIVE:   Patient stated I'm feeling pretty good today, just trying to get home for the holidays. OBJECTIVE:   Cognitive and Communication Status:  Neurologic State: (P) Alert, Eyes open spontaneously  Orientation Level: (P) Oriented X4  Cognition: (P) Follows commands, Appropriate decision making, Appropriate for age attention/concentration, Appropriate safety awareness      Dysphagia Treatment:  Oral Assessment:  Oral Assessment  Labial: No impairment  Dentition: Upper & lower dentures  Lingual: Decreased strength  Velum: No impairment  Mandible: No impairment  P.O. Trials:   Patient Position: >70 degrees   Vocal quality prior to P.O.: No impairment   Consistency Presented: Solid, Thin liquid, Puree   How Presented: Self-fed/presented, Cup/sip, Straw, Successive swallows   Bolus Acceptance: No impairment   Propulsion: No impairment   Oral Residue: None   Initiation of Swallow: No impairment   Laryngeal Elevation: Functional   Aspiration Signs/Symptoms: Delayed cough/throat clear, cough also present at baseline   Pharyngeal Phase Characteristics: No impairment, issues, or problems    Effective Modifications: Cup/sip, Alternate liquids/solids   Cues for Modifications: Minimal       Oral Phase Severity: Mild   Pharyngeal Phase Severity : No impairment    PAIN:  Pt reports 0/10 pain or discomfort prior to tx. Pt reports 0/10 pain or discomfort post tx.      After treatment:   []              Patient left in no apparent distress sitting up in chair  [x]              Patient left in no apparent distress in bed  [x]              Call bell left within reach  [x]              Nursing notified  []              Caregiver present  []              Bed alarm activated      COMMUNICATION/EDUCATION:   [x]              SLP educated pt with regard to compensatory swallow strategies and        aspiration/reflux precautions including: small bites/sips,        alternate liquids/solids, decrease feeding rate, HOB > 45 with all po, and                   upright in bed at 30 degrees after po for at least 45 minutes.      Thank you for this referral.    Carlos Leavitt M.S. CCC-SLP/L  Speech-Language Pathologist

## 2017-12-20 NOTE — ROUTINE PROCESS
Bedside shift change report given to [employer] RN (oncoming nurse) by Brice Galicia RN (offgoing nurse). Report included the following information SBAR, ED Summary, Intake/Output, MAR, Recent Results and Med Rec Status.

## 2017-12-20 NOTE — PROGRESS NOTES
Care Management Interventions  PCP Verified by CM: Yes (Pt last saw his PCP in Nov. 2017 for follow-up)  Mode of Transport at Discharge: BLS  Transition of Care Consult (CM Consult): Other (Pt reported receiving services from [facility] ([phone]) 9 am to 3 pm - 7 days a week. )  [address] No  Reason Outside [place]: Patient already serviced by other home care/hospice agency  Discharge Durable Medical Equipment: No (Pt has wc, power chair, bedside toliet, shower chair and pull  bar at home. )  Physical Therapy Consult: Yes  Occupational Therapy Consult: Yes  Speech Therapy Consult: Yes  Current Support Network: Lives with Spouse (Pt resides with his wife in an apt on the 5th floor. The apt has elevators. )  Confirm Follow Up Transport: Other (see comment) (Pt and his spouse reported they use \"Medical Transport\")  Plan discussed with Pt/Family/Caregiver: Yes (ZAYRA spoke with pt and pt's spouse. )  Freedom of Choice Offered:  (Pt is already connected with an agency and desires to continue. )  Discharge Location  Discharge Placement: Home     SW met with pt and spoke with the pt's spouse via telephone with pt's permission to gather information for this initial assessment. Pt and his spouse confirmed his level of function prior to this admission was good. He reported he developed a cough, went to the dr and was sent to the hospital. Pt's spouse denied pt having a POA or a [address] Directive. ZAYRA explained the Advance Directive and will be available should the family wish to complete one. Pt's spouse provided her cell phone number to ZAYRA ([phone]). ZAYRA will continue to monitor for any discharge related needs.     Luiza Kessler, MSW LSW

## 2017-12-20 NOTE — PROGRESS NOTES
RENAL DAILY PROGRESS NOTE    Subjective:     Admitted for SOB and fever seen for CKD stage 5    Complaint: feels better, no N/V, appetite good, some cough but no CP.     Current Facility-Administered Medications   Medication Dose Route Frequency    oxyCODONE-acetaminophen (PERCOCET) 5-325 mg per tablet 1 Tab  1 Tab Oral Q8H PRN    levobunolol (BETAGAN) 0.5 % ophthalmic solution 1 Drop  1 Drop Both Eyes QHS    atorvastatin (LIPITOR) tablet 10 mg  10 mg Oral DAILY    aspirin delayed-release tablet 81 mg  81 mg Oral DAILY    amLODIPine (NORVASC) tablet 10 mg  10 mg Oral DAILY    cloNIDine HCl (CATAPRES) tablet 0.2 mg  0.2 mg Oral BID    carvedilol (COREG) tablet 12.5 mg  12.5 mg Oral BID WITH MEALS    hydrALAZINE (APRESOLINE) tablet 10 mg  10 mg Oral TID    calcitRIOL (ROCALTROL) capsule 0.25 mcg  0.25 mcg Oral DAILY    timolol (TIMOPTIC) 0.5 % ophthalmic solution 1 Drop  1 Drop Both Eyes BID    calcium acetate (PHOSLO) capsule 667 mg  1 Cap Oral TID WITH MEALS    sodium bicarbonate tablet 325 mg  325 mg Oral BID    isosorbide mononitrate ER (IMDUR) tablet 30 mg  30 mg Oral DAILY    calcium-vitamin D (OS-LINETTE) 500 mg-200 unit tablet  1 Tab Oral BID WITH MEALS    cefTRIAXone (ROCEPHIN) 1 g in sterile water (preservative free) 10 mL IV syringe  1 g IntraVENous Q24H    acetaminophen (TYLENOL) tablet 650 mg  650 mg Oral Q6H PRN    insulin lispro (HUMALOG) injection   SubCUTAneous AC&HS    glucose chewable tablet 16 g  4 Tab Oral PRN    glucagon (GLUCAGEN) injection 1 mg  1 mg IntraMUSCular PRN    dextrose (D50) infusion 12.5-25 g  25-50 mL IntraVENous PRN    insulin glargine (LANTUS) injection 10 Units  10 Units SubCUTAneous ACD    heparin (porcine) injection 5,000 Units  5,000 Units SubCUTAneous Q8H    magnesium oxide (MAG-OX) tablet 400 mg  400 mg Oral BID    epoetin reny (EPOGEN;PROCRIT) injection 10,000 Units  10,000 Units SubCUTAneous Q7D           Objective:   Patient Vitals for the past 24 hrs:   Temp Pulse Resp BP SpO2   12/20/17 1133 96.5 °F (35.8 °C) 71 18 118/49 94 %   12/20/17 0733 99.4 °F (37.4 °C) 81 18 140/59 94 %   12/20/17 0435 99.5 °F (37.5 °C) 74 20 137/64 96 %   12/19/17 2330 98.6 °F (37 °C) 60 20 139/65 96 %   12/19/17 2129 - 69 - 120/57 -   12/19/17 1927 99 °F (37.2 °C) 67 20 119/64 99 %   12/19/17 1535 98.2 °F (36.8 °C) 71 20 121/59 97 %        Weight change: 0.907 kg (2 lb)     12/18 1901 - 12/20 0700  In: 440 [P.O.:440]  Out: 50 [Urine:50]    Intake/Output Summary (Last 24 hours) at 12/20/17 1445  Last data filed at 12/20/17 0835   Gross per 24 hour   Intake              240 ml   Output               50 ml   Net              190 ml     Physical Exam: alert, oriented x 3 afebrile    HEENT: non icteric  Neck: No JVD  Cardiovascular: regular no rub  C/L: clear ant/lat  Abdomen: soft + BS  Ext: Bilateral AKA    Data Review:     LABS:   Hematology: Recent Labs      12/20/17   0315  12/19/17   1050  12/18/17   1951  12/18/17   1240   WBC  4.7  5.7  6.7  7.1   HGB  7.6*  7.6*  7.9*  7.5*   HCT  23.4*  24.4*  26.3*  25.0*   Pl 200K  Chemistry: Recent Labs      12/20/17   0315  12/19/17   1728  12/19/17   1050  12/18/17   1240   BUN  80*   --   78*  91*   CREA  6.54*   --   6.35*  6.65*   CA  6.1*  6.4*  6.3*  7.5*   ALB   --   2.4*   --    --    K  4.7   --   4.0  4.3   NA  137   --   140  138   CL  105   --   107  103   CO2  21   --   20*  25   PHOS   --    --   4.3   --    GLU  159*   --   184*  288*    Albumin 2.4  A1C 9.6  Fe 15 sat 11% benjamin 338  Mag 1.7  IPTH 229 Vit D 25     IMAGES: Renal U/S WNL    CULTURE: Blood c/s GP cocci in chains and pairs in 1 aerobic bottle    IMPRESSION AND PLAN:   CKD stage 5 remains asymptomatic, no signs of fluid overload. Still wants to hold off on dialysis at least after the holidays. Cont with bicarb tabs, no acute need for HD. 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  Will send off [address]  HTN controlled  Fever/bacteremia.  Defer to primary team  Hypocalcemia from [address] [place] [address], Inc calcitriol         Koko Miller MD  12/20/2017

## 2017-12-20 NOTE — DIABETES MGMT
Diabetes Patient/Family Education Record  Pt educated on 12/19, please see note for details. Factors That  May Influence Patients Ability  to Learn or  Comply with Recommendations   []   Language barrier    []   Cultural needs   [x]   Motivation    []   Cognitive limitation    []   Physical   [x]   Education    []   Physiological factors   []   Hearing/vision/speaking impairment   []   Sabianism beliefs    []   Financial factors   []  Other:   []  No factors identified at this time.      Person Instructed:   [x]   Patient   []   Family   []  Other     Preference for Learning:   [x]   Verbal   []   Written   []  Demonstration     Level of Comprehension & Competence:    []  Good                                      [x] Fair                                     []  Poor                             []  Needs Reinforcement   [x]  Teachback completed    Education Component:   [x]  Medication management, pt says he adjusts his own insulin including lantus based on his BG levels   [x]  Nutritional management    []  Exercise   []  Signs, symptoms, and treatment of hyperglycemia and hypoglycemia   [] Prevention, recognition and treatment of hyperglycemia and hypoglycemia   []  Importance of blood glucose monitoring and how to obtain a blood glucose meter    []  Instruction on use of the blood glucose meter   []  Discuss the importance of HbA1C monitoring    []  Sick day guidelines   []  Proper use and disposal of lancets, needles, syringes or insulin pens (if appropriate)   []  Potential long-term complications (retinopathy, kidney disease, neuropathy, foot care)   [x] Information about whom to contact in case of emergency or for more information    [x]  Goal:  Patient/family will demonstrate understanding of Diabetes Self Management Skills by: (date) _12/27/17______  Plan for post-discharge education or self-management support:    [] Outpatient class schedule provided            [] Patient Declined    [] Scheduled for outpatient classes (date) _______     Daina Osorio MS, RD, CDE  Pager: [phone]

## 2017-12-20 NOTE — CONSULTS
Infectious Disease Consultation Note    Requested by: Dr. Barron Letters    Reason: gram positive bacteremia    Current abx Prior abx   Ceftriaxone since 12/18/17      Lines:       Assessment :    80 y.o. male with multiple medical problems including type 2 DM ( last hgbA1C 9.6 on 12/20/17), chronic kidney disease, HTN , HLPD, PVD , chronic Anemia admitted to [facility] on 12/18/17 for further eval of cough , SOB , ARF & Anemia. Now with gram positive bacteremia    Clinical picture consistent with sepsis (POA) due to gram positive bloodstream infection. Exact source of bacteremia unclear. History is likely suggestive of respiratory tract infection/pneumonia as the source. CXR on admission didn't reveal pneumonia. I will repeat cxr to verify. abx management complicated by h/o vancomycin allergy. Patient is improving off coverage for resistant gram positives. Hence, will not escalate coverage    Recommendations:    1. Continue ceftriaxone  2. Repeat cxr  3. Obtain blood cultures  4. F/u ID of gpc in blood cultures  5. F/u cbc, clinically     Advance Care planning: full code: discussed  with patient/surrogate decision maker:Margot Emerson: [phone]    Thank you for consultation request. Above plan was discussed in details with patient. Please call me if any further questions or concerns. Will continue to participate in the care of this patient. HPI:    80 y.o. male with multiple medical problems including type 2 DM ( last hgbA1C 9.6 on 12/20/17), chronic kidney disease, HTN , HLPD, PVD , chronic Anemia admitted to [facility] on 12/18/17 for further eval of cough , SOB , ARF & Anemia. Pt presented to [facility] with several  days of  Non productive cough and SOB. He was seen in [facility] er on 12/17 at which time he had elevated Temp of 102.4 and was told he had pneumonia with worsening renal function. He was taken off his demadex, hydrated and sent here for admission. Here he was started on ceftriaxone.  His cxr on admission didn't reveal infiltrates. His blood cultures 12/18 one of two sets revealed gram positive cocci in pairs/chains. I have been consulted for further recommendations. Patient denies headaches, visual disturbances, sore throat, runny nose, earaches, cp, sob, chills, abdominal pain, diarrhea, burning micturition, pain or weakness in extremities. He denies back pain/flank pain. He denies recent sick contacts. No h/o recent travel. No known h/o MRSA colonization or infection in the past.        Past Medical History:   Diagnosis Date    CAD (coronary artery disease)     Chronic combined systolic and diastolic heart failure ([facility] 3/17/2015    stable limited activity recent admsission     Chronic kidney disease, unspecified 3/17/2015    not on acei/arb     Coronary atherosclerosis of native coronary artery 3/17/2015    abnormal stress test medically managed no angina     [facility]     Diabetes mellitus     Essential hypertension     Essential hypertension, benign     On Hydralazine, Labetalol, clonidine and norvasc HX of ARF and hyperkalemia with ARB; stable    High cholesterol     Hypertension     Other and unspecified hyperlipidemia     On Zocor and Niaspan    Other primary cardiomyopathies 3/17/2015    ef 45%     Peripheral vascular disease, unspecified     Bilateral AKA    Peripheral vascular disease, unspecified     Bilateral AKA     Reflux     Renal insufficiency        Past Surgical History:   Procedure Laterality Date    HX ORTHOPAEDIC      double amputation,  hip replacement    HX PROSTATECTOMY         home Medication List    Details   sodium bicarbonate 650 mg tablet Take  by mouth two (2) times a day. 2 tabs      isosorbide mononitrate ER (IMDUR) 30 mg tablet Take  by mouth daily. calcium-vitamin D ([place] CALCIUM-VIT D3) 500 mg(1,250mg) -200 unit per tablet Take 1 Tab by mouth two (2) times daily (with meals). torsemide (DEMADEX) 20 mg tablet Take  by mouth daily.  2 tabs every other day      glucose 4 gram chewable tablet Take 15 g by mouth as needed. 1-5 tabs as needed      oxyCODONE-acetaminophen (PERCOCET 10)  mg per tablet Take 1 Tab by mouth.      calcium acetate (PHOSLO) 667 mg cap Take  by mouth three (3) times daily (with meals). calcitRIOL (ROCALTROL) 0.25 mcg capsule Take 0.25 mcg by mouth daily. timolol (TIMOPTIC) 0.5 % ophthalmic solution 1 Drop two (2) times a day. hydrALAZINE (APRESOLINE) 10 mg tablet Take 10 mg by mouth. 2 1/2 tabs twice a day      cloNIDine (CATAPRESS) 0.3 mg tablet Take 0.2 mg by mouth two (2) times a day. carvedilol (COREG) 6.25 mg tablet Take 12.5 mg by mouth two (2) times daily (with meals). levobunolol (BETAGAN) 0.5 % ophthalmic solution Administer 1 Drop to both eyes nightly. insulin glargine (LANTUS) 100 unit/mL injection by SubCUTAneous route once. atorvastatin (LIPITOR) 10 mg tablet Take  by mouth daily. aspirin delayed-release 81 mg tablet Take  by mouth daily. insulin lispro (HUMALOG) 100 unit/mL injection by SubCUTAneous route. amLODIPine (NORVASC) 10 mg tablet Take  by mouth daily. ergocalciferol (ERGOCALCIFEROL) 50,000 unit capsule Take 50,000 Units by mouth.  2 times per week             Current Facility-Administered Medications   Medication Dose Route Frequency    oxyCODONE-acetaminophen (PERCOCET) 5-325 mg per tablet 1 Tab  1 Tab Oral Q8H PRN    levobunolol (BETAGAN) 0.5 % ophthalmic solution 1 Drop  1 Drop Both Eyes QHS    atorvastatin (LIPITOR) tablet 10 mg  10 mg Oral DAILY    aspirin delayed-release tablet 81 mg  81 mg Oral DAILY    amLODIPine (NORVASC) tablet 10 mg  10 mg Oral DAILY    cloNIDine HCl (CATAPRES) tablet 0.2 mg  0.2 mg Oral BID    carvedilol (COREG) tablet 12.5 mg  12.5 mg Oral BID WITH MEALS    hydrALAZINE (APRESOLINE) tablet 10 mg  10 mg Oral TID    calcitRIOL (ROCALTROL) capsule 0.25 mcg  0.25 mcg Oral DAILY    timolol (TIMOPTIC) 0.5 % ophthalmic solution 1 Drop  1 Drop Both Eyes BID    calcium acetate (PHOSLO) capsule 667 mg  1 Cap Oral TID WITH MEALS    sodium bicarbonate tablet 325 mg  325 mg Oral BID    isosorbide mononitrate ER (IMDUR) tablet 30 mg  30 mg Oral DAILY    calcium-vitamin D (OS-LINETTE) 500 mg-200 unit tablet  1 Tab Oral BID WITH MEALS    cefTRIAXone (ROCEPHIN) 1 g in sterile water (preservative free) 10 mL IV syringe  1 g IntraVENous Q24H    acetaminophen (TYLENOL) tablet 650 mg  650 mg Oral Q6H PRN    insulin lispro (HUMALOG) injection   SubCUTAneous AC&HS    glucose chewable tablet 16 g  4 Tab Oral PRN    glucagon (GLUCAGEN) injection 1 mg  1 mg IntraMUSCular PRN    dextrose (D50) infusion 12.5-25 g  25-50 mL IntraVENous PRN    insulin glargine (LANTUS) injection 10 Units  10 Units SubCUTAneous ACD    heparin (porcine) injection 5,000 Units  5,000 Units SubCUTAneous Q8H    magnesium oxide (MAG-OX) tablet 400 mg  400 mg Oral BID    epoetin reny (EPOGEN;PROCRIT) injection 10,000 Units  10,000 Units SubCUTAneous Q7D       Allergies: Vancomycin    Family History   Problem Relation Age of Onset    Diabetes Mother     Heart Disease Mother     Cancer Other      Social History     Social History    Marital status:      Spouse name: N/A    Number of children: N/A    Years of education: N/A     Occupational History    Not on file.      Social History Main Topics    Smoking status: Former Smoker     Packs/day: 2.00     Quit date: 1998    Smokeless tobacco: Never Used    Alcohol use No    Drug use: No    Sexual activity: Not on file     Other Topics Concern    Not on file     Social History Narrative     History   Smoking Status    Former Smoker    Packs/day: 2.00    Quit date: 1998   Smokeless Tobacco    Never Used        Temp (24hrs), Av.5 °F (36.9 °C), Min:96.5 °F (35.8 °C), Max:99.5 °F (37.5 °C)    Visit Vitals    /49 (BP 1 Location: Right arm, BP Patient Position: At rest)  Pulse 71    Temp 96.5 °F (35.8 °C)    Resp 18    Ht 6' (1.829 m)  Comment: prior to AKA    Wt 73.5 kg (162 lb)    SpO2 94%    BMI 21.97 kg/m2       ROS: 12 point ROS obtained in details. Pertinent positives as mentioned in HPI,   otherwise negative    Physical Exam:    General: Well developed, well nourished male laying on the bed AAOx3 in no acute distress. General:   awake alert and oriented   HEENT:  Normocephalic, atraumatic, PERRL, EOMI, no scleral icterus or pallor; no conjunctival hemmohage;  nasal and oral mucous are moist and without evidence of lesions. No thrush. Neck supple, no bruits. Lymph Nodes:   no cervical, axillary or inguinal adenopathy   Lungs:   non-labored, bilaterally clear to auscultation- no crackles wheezes rales or rhonchi   Heart:  RRR, s1 and s2; no  rubs or gallops, no edema, + pedal pulses   Abdomen:  soft, non-distended, active bowel sounds, no hepatomegaly, no splenomegaly. Non-tender   Genitourinary:  deferred   Extremities:   no clubbing, cyanosis; no joint effusions or swelling; Full ROM of all large joints to the upper and lower extremities; muscle mass appropriate for age   Neurologic:  No gross focal sensory abnormalities; 5/5 muscle strength to upper and lower extremities. Speech appropriate.  Cranial nerves intact                        Skin:  No rash or ulcers noted   Back:  no spinal or paraspinal muscle tenderness or rigidity, no CVA tenderness     Psychiatric:  No suicidal or homicidal ideations, appropriate mood and affect         Labs: Results:   Chemistry Recent Labs      12/20/17   0315  12/19/17   1728  12/19/17   1050  12/18/17   1240   GLU  159*   --   184*  288*   NA  137   --   140  138   K  4.7   --   4.0  4.3   CL  105   --   107  103   CO2  21   --   20*  25   BUN  80*   --   78*  91*   CREA  6.54*   --   6.35*  6.65*   CA  6.1*  6.4*  6.3*  7.5*   AGAP  11   --   13  10   BUCR  12   --   12  14   ALB   --   2.4*   --    --       CBC w/Diff Recent Labs      12/20/17   0315  12/19/17   1050  12/18/17   1951   WBC  4.7  5.7  6.7   RBC  2.74*  2.80*  2.98*   HGB  7.6*  7.6*  7.9*   HCT  23.4*  24.4*  26.3*   PLT  200  195  205   GRANS  50  52  60   LYMPH  25  28  18*   EOS  6*  3  3      Microbiology Recent Labs      12/18/17   1335  12/18/17   1310  12/18/17   1300   CULT  CULTURE IN PROGRESS,FURTHER UPDATES TO FOLLOW  NO GROWTH 2 DAYS  >100,000 COLONIES/mL MULTIPLE MICROORGANISMS PRESENT, POSSIBLE CONTAMINATION. PLEASE REPEAT CULTURE IF CLINICALLY INDICATED.           RADIOLOGY:    All available imaging studies/reports in [facility] for this admission were reviewed    Dr. Fili Thompson, Infectious Disease Specialist  [phone]  December 20, 2017  12:50 PM

## 2017-12-20 NOTE — ROUTINE PROCESS
Bedside and Verbal shift change report given to Vanessa Suarez RN (oncoming nurse) by Huong Hinds RN (offgoing nurse). Report included the following information SBAR, Kardex, MAR and Recent Results.     SITUATION:    Code Status: Full Code   Reason for Admission: Febrile illness   Anemia   Renal failure   Anemia   Renal failure   Febrile illness   Community acquired pneumonia    [facility] day: 2   Problem List:       Hospital Problems  Date Reviewed: 9/25/2017          Codes Class Noted POA    Community acquired pneumonia ICD-10-CM: J18.9  ICD-9-CM: 486  12/19/2017 Unknown        Renal failure ICD-10-CM: N19  ICD-9-CM: 586  12/18/2017 Unknown        Anemia ICD-10-CM: D64.9  ICD-9-CM: 285.9  12/18/2017 Unknown        Febrile illness ICD-10-CM: R50.9  ICD-9-CM: 780.60  12/18/2017 Unknown              BACKGROUND:    Past Medical History:   Past Medical History:   Diagnosis Date    CAD (coronary artery disease)     Chronic combined systolic and diastolic heart failure ([address] 3/17/2015    stable limited activity recent admsission     Chronic kidney disease, unspecified 3/17/2015    not on acei/arb     Coronary atherosclerosis of native coronary artery 3/17/2015    abnormal stress test medically managed no angina     Diabetes ([address]     Diabetes mellitus     Essential hypertension     Essential hypertension, benign     On Hydralazine, Labetalol, clonidine and norvasc HX of ARF and hyperkalemia with ARB; stable    High cholesterol     Hypertension     Other and unspecified hyperlipidemia     On Zocor and Niaspan    Other primary cardiomyopathies 3/17/2015    ef 45%     Peripheral vascular disease, unspecified     Bilateral AKA    Peripheral vascular disease, unspecified     Bilateral AKA     Reflux     Renal insufficiency          Patient taking anticoagulants yes     ASSESSMENT:    Changes in Assessment Throughout Shift:  none     Patient has Central Line: no Reasons if yes: na   Patient has Barraza Cath: no Reasons if yes: na      Last Vitals:     Vitals:    12/19/17 2330 12/20/17 0435 12/20/17 0450 12/20/17 0733   BP: 139/65 137/64  140/59   Pulse: 60 74  81   Resp: 20 20 18   Temp: 98.6 °F (37 °C) 99.5 °F (37.5 °C)  99.4 °F (37.4 °C)   SpO2: 96% 96%  94%   Weight:   73.5 kg (162 lb)    Height:            IV and DRAINS (will only show if present)   Peripheral IV 12/18/17-Site Assessment: Clean, dry, & intact     WOUND (if present)   Wound Type:  none   Dressing present Dressing Present : Changed   Wound Concerns/Notes:  none     PAIN    Pain Assessment    Pain Intensity 1: 0 (12/19/17 1200)    Pain Location 1: Foot    Pain Intervention(s) 1: Medication (see MAR)    Patient Stated Pain Goal: 0  o Interventions for Pain:  none, medication  o Intervention effective: yes  o Time of last intervention:  See Mar   o Reassessment Completed: yes      Last 3 Weights:  Last 3 Recorded Weights in this Encounter    12/18/17 1135 12/18/17 2352 12/20/17 0450   Weight: 72.6 kg (160 lb) 72.3 kg (159 lb 4.8 oz) 73.5 kg (162 lb)     Weight change: 0.907 kg (2 lb)     INTAKE/OUPUT    Current Shift:      Last three shifts: 12/18 1901 - 12/20 0700  In: 240 [P.O.:240]  Out: 50 [Urine:50]     LAB RESULTS     Recent Labs      12/20/17   0315  12/19/17   1050  12/18/17   1951   WBC  4.7  5.7  6.7   HGB  7.6*  7.6*  7.9*   HCT  23.4*  24.4*  26.3*   PLT  200  195  205        Recent Labs      12/20/17   0315  12/19/17   1728  12/19/17   1050  12/18/17   1240   NA  137   --   140  138   K  4.7   --   4.0  4.3   GLU  159*   --   184*  288*   BUN  80*   --   78*  91*   CREA  6.54*   --   6.35*  6.65*   CA  6.1*  6.4*  6.3*  7.5*   MG  1.7   --   1.5*   --        RECOMMENDATIONS AND DISCHARGE PLANNING     1. Pending tests/procedures/ Plan of Care or Other Needs:  labs     2. Discharge plan for patient and Needs/Barriers:  Return home    3. Estimated Discharge Date: pending Posted on Whiteboard in Patients Room: yes      4. The patient's care plan was reviewed with the oncoming nurse. \"HEALS\" SAFETY CHECK      Fall Risk    Total Score: 3    Safety Measures: Safety Measures: Bed/Chair-Wheels locked, Bed in low position, Call light within reach, Fall prevention (comment)    A safety check occurred in the patient's room between off going nurse and oncoming nurse listed above. The safety check included the below items  Area Items   H  High Alert Medications - Verify all high alert medication drips (heparin, PCA, etc.)   E  Equipment - Suction is set up for ALL patients (with brenda)  - Red plugs utilized for all equipment (IV pumps, etc.)  - WOWs wiped down at end of shift.  - Room stocked with oxygen, suction, and other unit-specific supplies   A  Alarms - Bed alarm is set for fall risk patients  - Ensure chair alarm is in place and activated if patient is up in a chair   L  Lines - Check IV for any infiltration  - Barraza bag is empty if patient has a Barraza   - Tubing and IV bags are labeled   S  Safety   - Room is clean, patient is clean, and equipment is clean. - Hallways are clear from equipment besides carts. - Fall bracelet on for fall risk patients  - Ensure room is clear and free of clutter  - Suction is set up for ALL patients (with brenda)  - Hallways are clear from equipment besides carts.    - Isolation precautions followed, supplies available outside room, sign posted     Kash Muhammad RN

## 2017-12-21 LAB
ALBUMIN SERPL-MCNC: 2.4 G/DL (ref 3.4–5)
ALBUMIN/GLOB SERPL: 0.6 {RATIO} (ref 0.8–1.7)
ALP SERPL-CCNC: 82 U/L (ref 45–117)
ALT SERPL-CCNC: 20 U/L (ref 16–61)
ANION GAP SERPL CALC-SCNC: 10 MMOL/L (ref 3–18)
AST SERPL-CCNC: 24 U/L (ref 15–37)
BASOPHILS # BLD: 0 K/UL (ref 0–0.1)
BASOPHILS NFR BLD: 1 % (ref 0–2)
BILIRUB DIRECT SERPL-MCNC: <0.1 MG/DL (ref 0–0.2)
BILIRUB SERPL-MCNC: 0.2 MG/DL (ref 0.2–1)
BUN SERPL-MCNC: 74 MG/DL (ref 7–18)
BUN/CREAT SERPL: 12 (ref 12–20)
CALCIUM SERPL-MCNC: 6.4 MG/DL (ref 8.5–10.1)
CHLORIDE SERPL-SCNC: 105 MMOL/L (ref 100–108)
CO2 SERPL-SCNC: 21 MMOL/L (ref 21–32)
CREAT SERPL-MCNC: 6.18 MG/DL (ref 0.6–1.3)
DIFFERENTIAL METHOD BLD: ABNORMAL
EOSINOPHIL # BLD: 0.3 K/UL (ref 0–0.4)
EOSINOPHIL NFR BLD: 6 % (ref 0–5)
ERYTHROCYTE [DISTWIDTH] IN BLOOD BY AUTOMATED COUNT: 15.2 % (ref 11.6–14.5)
GLOBULIN SER CALC-MCNC: 3.9 G/DL (ref 2–4)
GLUCOSE BLD STRIP.AUTO-MCNC: 126 MG/DL (ref 70–110)
GLUCOSE BLD STRIP.AUTO-MCNC: 145 MG/DL (ref 70–110)
GLUCOSE BLD STRIP.AUTO-MCNC: 155 MG/DL (ref 70–110)
GLUCOSE BLD STRIP.AUTO-MCNC: 171 MG/DL (ref 70–110)
GLUCOSE BLD STRIP.AUTO-MCNC: 99 MG/DL (ref 70–110)
GLUCOSE SERPL-MCNC: 159 MG/DL (ref 74–99)
HCT VFR BLD AUTO: 24.6 % (ref 36–48)
HGB BLD-MCNC: 8 G/DL (ref 13–16)
KAPPA LC FREE SER-MCNC: 169.9 MG/L (ref 3.3–19.4)
KAPPA LC FREE/LAMBDA FREE SER: 2.17 {RATIO} (ref 0.26–1.65)
LAMBDA LC FREE SERPL-MCNC: 78.3 MG/L (ref 5.7–26.3)
LYMPHOCYTES # BLD: 1.2 K/UL (ref 0.9–3.6)
LYMPHOCYTES NFR BLD: 27 % (ref 21–52)
MCH RBC QN AUTO: 27.5 PG (ref 24–34)
MCHC RBC AUTO-ENTMCNC: 32.5 G/DL (ref 31–37)
MCV RBC AUTO: 84.5 FL (ref 74–97)
MONOCYTES # BLD: 0.5 K/UL (ref 0.05–1.2)
MONOCYTES NFR BLD: 12 % (ref 3–10)
NEUTS SEG # BLD: 2.4 K/UL (ref 1.8–8)
NEUTS SEG NFR BLD: 54 % (ref 40–73)
PLATELET # BLD AUTO: 205 K/UL (ref 135–420)
PMV BLD AUTO: 10.8 FL (ref 9.2–11.8)
POTASSIUM SERPL-SCNC: 4.6 MMOL/L (ref 3.5–5.5)
PROT SERPL-MCNC: 6.3 G/DL (ref 6.4–8.2)
RBC # BLD AUTO: 2.91 M/UL (ref 4.7–5.5)
SODIUM SERPL-SCNC: 136 MMOL/L (ref 136–145)
WBC # BLD AUTO: 4.4 K/UL (ref 4.6–13.2)

## 2017-12-21 PROCEDURE — 74011250636 HC RX REV CODE- 250/636: Performed by: INTERNAL MEDICINE

## 2017-12-21 PROCEDURE — 74011250637 HC RX REV CODE- 250/637: Performed by: HOSPITALIST

## 2017-12-21 PROCEDURE — 80048 BASIC METABOLIC PNL TOTAL CA: CPT | Performed by: HOSPITALIST

## 2017-12-21 PROCEDURE — 74011250637 HC RX REV CODE- 250/637: Performed by: INTERNAL MEDICINE

## 2017-12-21 PROCEDURE — 82962 GLUCOSE BLOOD TEST: CPT

## 2017-12-21 PROCEDURE — 80076 HEPATIC FUNCTION PANEL: CPT | Performed by: HOSPITALIST

## 2017-12-21 PROCEDURE — 97165 OT EVAL LOW COMPLEX 30 MIN: CPT

## 2017-12-21 PROCEDURE — 77010033678 HC OXYGEN DAILY

## 2017-12-21 PROCEDURE — 65660000000 HC RM CCU STEPDOWN

## 2017-12-21 PROCEDURE — 74011636637 HC RX REV CODE- 636/637: Performed by: INTERNAL MEDICINE

## 2017-12-21 PROCEDURE — 74011250636 HC RX REV CODE- 250/636: Performed by: HOSPITALIST

## 2017-12-21 PROCEDURE — 74011636637 HC RX REV CODE- 636/637: Performed by: HOSPITALIST

## 2017-12-21 PROCEDURE — 85025 COMPLETE CBC W/AUTO DIFF WBC: CPT | Performed by: HOSPITALIST

## 2017-12-21 PROCEDURE — 36415 COLL VENOUS BLD VENIPUNCTURE: CPT | Performed by: HOSPITALIST

## 2017-12-21 PROCEDURE — 74011000250 HC RX REV CODE- 250: Performed by: HOSPITALIST

## 2017-12-21 RX ORDER — FLUTICASONE PROPIONATE 50 MCG
2 SPRAY, SUSPENSION (ML) NASAL DAILY
Status: DISCONTINUED | OUTPATIENT
Start: 2017-12-21 | End: 2017-12-22 | Stop reason: HOSPADM

## 2017-12-21 RX ORDER — AZITHROMYCIN 250 MG/1
500 TABLET, FILM COATED ORAL DAILY
Status: DISCONTINUED | OUTPATIENT
Start: 2017-12-21 | End: 2017-12-22 | Stop reason: HOSPADM

## 2017-12-21 RX ORDER — FLUTICASONE PROPIONATE 50 MCG
2 SPRAY, SUSPENSION (ML) NASAL DAILY
Status: DISCONTINUED | OUTPATIENT
Start: 2017-12-22 | End: 2017-12-21

## 2017-12-21 RX ADMIN — FLUTICASONE PROPIONATE 2 SPRAY: 50 SPRAY, METERED NASAL at 16:53

## 2017-12-21 RX ADMIN — HEPARIN SODIUM 5000 UNITS: 5000 INJECTION, SOLUTION INTRAVENOUS; SUBCUTANEOUS at 16:47

## 2017-12-21 RX ADMIN — ATORVASTATIN CALCIUM 10 MG: 10 TABLET, FILM COATED ORAL at 09:19

## 2017-12-21 RX ADMIN — SODIUM BICARBONATE 650 MG TABLET 325 MG: at 18:45

## 2017-12-21 RX ADMIN — HYDRALAZINE HYDROCHLORIDE 10 MG: 10 TABLET, FILM COATED ORAL at 09:18

## 2017-12-21 RX ADMIN — ASPIRIN 81 MG: 81 TABLET ORAL at 09:18

## 2017-12-21 RX ADMIN — HEPARIN SODIUM 5000 UNITS: 5000 INJECTION, SOLUTION INTRAVENOUS; SUBCUTANEOUS at 00:44

## 2017-12-21 RX ADMIN — WATER 1 G: 1 INJECTION INTRAMUSCULAR; INTRAVENOUS; SUBCUTANEOUS at 03:21

## 2017-12-21 RX ADMIN — INSULIN LISPRO 2 UNITS: 100 INJECTION, SOLUTION INTRAVENOUS; SUBCUTANEOUS at 17:18

## 2017-12-21 RX ADMIN — INSULIN LISPRO 3 UNITS: 100 INJECTION, SOLUTION INTRAVENOUS; SUBCUTANEOUS at 17:17

## 2017-12-21 RX ADMIN — LEVOBUNOLOL HYDROCHLORIDE 1 DROP: 5 SOLUTION/ DROPS OPHTHALMIC at 22:34

## 2017-12-21 RX ADMIN — IRON SUCROSE 300 MG: 20 INJECTION, SOLUTION INTRAVENOUS at 16:51

## 2017-12-21 RX ADMIN — TIMOLOL MALEATE 1 DROP: 5 SOLUTION OPHTHALMIC at 19:55

## 2017-12-21 RX ADMIN — AZITHROMYCIN 500 MG: 250 TABLET, FILM COATED ORAL at 09:19

## 2017-12-21 RX ADMIN — AMLODIPINE BESYLATE 10 MG: 10 TABLET ORAL at 09:18

## 2017-12-21 RX ADMIN — INSULIN LISPRO 3 UNITS: 100 INJECTION, SOLUTION INTRAVENOUS; SUBCUTANEOUS at 09:20

## 2017-12-21 RX ADMIN — CARVEDILOL 12.5 MG: 12.5 TABLET, FILM COATED ORAL at 09:19

## 2017-12-21 RX ADMIN — HEPARIN SODIUM 5000 UNITS: 5000 INJECTION, SOLUTION INTRAVENOUS; SUBCUTANEOUS at 23:51

## 2017-12-21 RX ADMIN — INSULIN LISPRO 3 UNITS: 100 INJECTION, SOLUTION INTRAVENOUS; SUBCUTANEOUS at 12:54

## 2017-12-21 RX ADMIN — CALCIUM ACETATE 667 MG: 667 CAPSULE ORAL at 18:45

## 2017-12-21 RX ADMIN — TIMOLOL MALEATE 1 DROP: 5 SOLUTION OPHTHALMIC at 12:55

## 2017-12-21 RX ADMIN — LEVOBUNOLOL HYDROCHLORIDE 1 DROP: 5 SOLUTION/ DROPS OPHTHALMIC at 00:46

## 2017-12-21 RX ADMIN — CLONIDINE HYDROCHLORIDE 0.2 MG: 0.1 TABLET ORAL at 09:19

## 2017-12-21 RX ADMIN — Medication 400 MG: at 09:20

## 2017-12-21 RX ADMIN — ISOSORBIDE MONONITRATE 30 MG: 30 TABLET, EXTENDED RELEASE ORAL at 09:19

## 2017-12-21 RX ADMIN — CARVEDILOL 12.5 MG: 12.5 TABLET, FILM COATED ORAL at 18:45

## 2017-12-21 RX ADMIN — INSULIN GLARGINE 10 UNITS: 100 INJECTION, SOLUTION SUBCUTANEOUS at 16:45

## 2017-12-21 RX ADMIN — HEPARIN SODIUM 5000 UNITS: 5000 INJECTION, SOLUTION INTRAVENOUS; SUBCUTANEOUS at 09:15

## 2017-12-21 RX ADMIN — HYDRALAZINE HYDROCHLORIDE 10 MG: 10 TABLET, FILM COATED ORAL at 22:34

## 2017-12-21 RX ADMIN — CALCIUM ACETATE 667 MG: 667 CAPSULE ORAL at 12:55

## 2017-12-21 RX ADMIN — OXYCODONE HYDROCHLORIDE AND ACETAMINOPHEN 1 TABLET: 5; 325 TABLET ORAL at 03:21

## 2017-12-21 RX ADMIN — CALCITRIOL 0.5 MCG: 0.25 CAPSULE, LIQUID FILLED ORAL at 09:17

## 2017-12-21 RX ADMIN — CLONIDINE HYDROCHLORIDE 0.2 MG: 0.1 TABLET ORAL at 19:50

## 2017-12-21 RX ADMIN — CALCIUM ACETATE 667 MG: 667 CAPSULE ORAL at 09:19

## 2017-12-21 RX ADMIN — HYDRALAZINE HYDROCHLORIDE 10 MG: 10 TABLET, FILM COATED ORAL at 16:45

## 2017-12-21 RX ADMIN — SODIUM BICARBONATE 650 MG TABLET 325 MG: at 09:19

## 2017-12-21 NOTE — ROUTINE PROCESS
Bedside report with off going nurse, patient quietly resting. resp even, no distress noted. Patient's call light in reach.

## 2017-12-21 NOTE — ROUTINE PROCESS
Bedside and Verbal shift change report given to Violeta Forbes RN (oncoming nurse) by Agatha Santiago RN (offgoing nurse). Report included the following information SBAR, Kardex, MAR and Recent Results.     SITUATION:    Code Status: Full Code  Reason for Admission: Febrile illness  Anemia  Renal failure  Anemia  Renal failure  Febrile illness   Community acquired pneumonia    Community Hospital day: 3   Problem List:       Hospital Problems  Date Reviewed: 9/25/2017          Codes Class Noted POA    Community acquired pneumonia ICD-10-CM: J18.9  ICD-9-CM: 486  12/19/2017 Unknown        Renal failure ICD-10-CM: N19  ICD-9-CM: 586  12/18/2017 Unknown        Anemia ICD-10-CM: D64.9  ICD-9-CM: 285.9  12/18/2017 Unknown        Febrile illness ICD-10-CM: R50.9  ICD-9-CM: 780.60  12/18/2017 Unknown              BACKGROUND:    Past Medical History:   Past Medical History:   Diagnosis Date    CAD (coronary artery disease)     Chronic combined systolic and diastolic heart failure ([address] 3/17/2015    stable limited activity recent admsission     Chronic kidney disease, unspecified 3/17/2015    not on acei/arb     Coronary atherosclerosis of native coronary artery 3/17/2015    abnormal stress test medically managed no angina     Diabetes ([address]     Diabetes mellitus     Essential hypertension     Essential hypertension, benign     On Hydralazine, Labetalol, clonidine and norvasc HX of ARF and hyperkalemia with ARB; stable    High cholesterol     Hypertension     Other and unspecified hyperlipidemia     On Zocor and Niaspan    Other primary cardiomyopathies 3/17/2015    ef 45%     Peripheral vascular disease, unspecified     Bilateral AKA    Peripheral vascular disease, unspecified     Bilateral AKA     Reflux     Renal insufficiency          Patient taking anticoagulants yes     ASSESSMENT:    Changes in Assessment Throughout Shift:  none     Patient has Central Line: no Reasons if yes: na   Patient has Barraza Cath: no Reasons if yes: na      Last Vitals:     Vitals:    12/20/17 1521 12/20/17 2012 12/20/17 2247 12/21/17 0028   BP: 125/54 114/55 134/61 128/66   Pulse: 72 69 73 72   Resp: 18 16 18   Temp: 97.1 °F (36.2 °C) 97.8 °F (36.6 °C)  99.1 °F (37.3 °C)   SpO2: 97%   94%   Weight:       Height:            IV and DRAINS (will only show if present)   Peripheral IV 12/18/17-Site Assessment: Clean, dry, & intact     WOUND (if present)   Wound Type:  none   Dressing present Dressing Present : Intact, not due to be changed   Wound Concerns/Notes:  none     PAIN    Pain Assessment    Pain Intensity 1: 0 (12/20/17 2012)    Pain Location 1: Leg (phantom leg pain)    Pain Intervention(s) 1: Medication (see MAR)    Patient Stated Pain Goal: 0  o Interventions for Pain:  none, medication  o Intervention effective: yes  o Time of last intervention:  See Mar   o Reassessment Completed: yes      Last 3 Weights:  Last 3 Recorded Weights in this Encounter    12/18/17 1135 12/18/17 2352 12/20/17 0450   Weight: 72.6 kg (160 lb) 72.3 kg (159 lb 4.8 oz) 73.5 kg (162 lb)     Weight change:      INTAKE/OUPUT    Current Shift:      Last three shifts: 12/19 0701 - 12/20 1900  In: 480 [P.O.:440; I.V.:40]  Out: 650 [Urine:650]     LAB RESULTS     Recent Labs      12/20/17   0315  12/19/17   1050  12/18/17   1951   WBC  4.7  5.7  6.7   HGB  7.6*  7.6*  7.9*   HCT  23.4*  24.4*  26.3*   PLT  200  195  205        Recent Labs      12/20/17   0315  12/19/17   1728  12/19/17   1050  12/18/17   1240   NA  137   --   140  138   K  4.7   --   4.0  4.3   GLU  159*   --   184*  288*   BUN  80*   --   78*  91*   CREA  6.54*   --   6.35*  6.65*   CA  6.1*  6.4*  6.3*  7.5*   MG  1.7   --   1.5*   --        RECOMMENDATIONS AND DISCHARGE PLANNING     1. Pending tests/procedures/ Plan of Care or Other Needs:  labs     2. Discharge plan for patient and Needs/Barriers:  Return home    3.  Estimated Discharge Date: pending Posted on Whiteboard in [address] [place] [address] Room: yes      4. The patient's care plan was reviewed with the oncoming nurse. \"HEALS\" SAFETY CHECK      Fall Risk    Total Score: 2    Safety Measures: Safety Measures: Bed/Chair alarm on, Bed/Chair-Wheels locked, Bed in low position, Call light within reach, Fall prevention (comment)    A safety check occurred in the patient's room between off going nurse and oncoming nurse listed above. The safety check included the below items  Area Items   H  High Alert Medications - Verify all high alert medication drips (heparin, PCA, etc.)   E  Equipment - Suction is set up for ALL patients (with brenda)  - Red plugs utilized for all equipment (IV pumps, etc.)  - WOWs wiped down at end of shift.  - Room stocked with oxygen, suction, and other unit-specific supplies   A  Alarms - Bed alarm is set for fall risk patients  - Ensure chair alarm is in place and activated if patient is up in a chair   L  Lines - Check IV for any infiltration  - Barraza bag is empty if patient has a Barraza   - Tubing and IV bags are labeled   S  Safety   - Room is clean, patient is clean, and equipment is clean. - Hallways are clear from equipment besides carts. - Fall bracelet on for fall risk patients  - Ensure room is clear and free of clutter  - Suction is set up for ALL patients (with brenda)  - Hallways are clear from equipment besides carts.    - Isolation precautions followed, supplies available outside room, sign posted     Boone Nowak RN

## 2017-12-21 NOTE — PROGRESS NOTES
6734  Received report. Patient A/OX4. Denies pain. No acute distress noted. Call light within reach. 1110  No acute distress noted. 1135  Complete bed bath given. No acute distress noted. Call light within reach. 1500  No acute distress noted. Bedside and Verbal shift change report given to [address] [place] [address] [address] (oncoming nurse) by Holli Madison (offgoing nurse). Report included the following information SBAR and Kardex.

## 2017-12-21 NOTE — PROGRESS NOTES
Problem: Falls - Risk of  Goal: *Absence of Falls  Document Hudson Fall Risk and appropriate interventions in the flowsheet.    Outcome: Progressing Towards Goal  Fall Risk Interventions:  Mobility Interventions: Patient to call before getting OOB         Medication Interventions: Patient to call before getting OOB, Teach patient to arise slowly    Elimination Interventions: Call light in reach, Patient to call for help with toileting needs

## 2017-12-21 NOTE — ROUTINE PROCESS
Bedside shift change report given to Patricia Lackey RN (oncoming nurse) by Sandee Mcginnis RN (offgoing nurse). Report included the following information SBAR, Kardex, Intake/Output, MAR, Recent Results and Med Rec Status.

## 2017-12-21 NOTE — PROGRESS NOTES
RENAL DAILY PROGRESS NOTE    Subjective:     Admitted for SOB and fever seen for CKD stage 5    Complaint: feels better, no N/V, appetite good, some cough but no CP.     Current Facility-Administered Medications   Medication Dose Route Frequency    azithromycin (ZITHROMAX) tablet 500 mg  500 mg Oral DAILY    fluticasone (FLONASE) 50 mcg/actuation nasal spray 2 Spray  2 Spray Both Nostrils DAILY    calcitRIOL (ROCALTROL) capsule 0.5 mcg  0.5 mcg Oral DAILY    insulin lispro (HUMALOG) injection 3 Units  3 Units SubCUTAneous TIDAC    oxyCODONE-acetaminophen (PERCOCET) 5-325 mg per tablet 1 Tab  1 Tab Oral Q8H PRN    levobunolol (BETAGAN) 0.5 % ophthalmic solution 1 Drop  1 Drop Both Eyes QHS    atorvastatin (LIPITOR) tablet 10 mg  10 mg Oral DAILY    aspirin delayed-release tablet 81 mg  81 mg Oral DAILY    amLODIPine (NORVASC) tablet 10 mg  10 mg Oral DAILY    cloNIDine HCl (CATAPRES) tablet 0.2 mg  0.2 mg Oral BID    carvedilol (COREG) tablet 12.5 mg  12.5 mg Oral BID WITH MEALS    hydrALAZINE (APRESOLINE) tablet 10 mg  10 mg Oral TID    timolol (TIMOPTIC) 0.5 % ophthalmic solution 1 Drop  1 Drop Both Eyes BID    calcium acetate (PHOSLO) capsule 667 mg  1 Cap Oral TID WITH MEALS    sodium bicarbonate tablet 325 mg  325 mg Oral BID    isosorbide mononitrate ER (IMDUR) tablet 30 mg  30 mg Oral DAILY    cefTRIAXone (ROCEPHIN) 1 g in sterile water (preservative free) 10 mL IV syringe  1 g IntraVENous Q24H    acetaminophen (TYLENOL) tablet 650 mg  650 mg Oral Q6H PRN    insulin lispro (HUMALOG) injection   SubCUTAneous AC&HS    glucose chewable tablet 16 g  4 Tab Oral PRN    glucagon (GLUCAGEN) injection 1 mg  1 mg IntraMUSCular PRN    dextrose (D50) infusion 12.5-25 g  25-50 mL IntraVENous PRN    insulin glargine (LANTUS) injection 10 Units  10 Units SubCUTAneous ACD    heparin (porcine) injection 5,000 Units  5,000 Units SubCUTAneous Q8H    epoetin reny (EPOGEN;PROCRIT) injection 10,000 Units 10,000 Units SubCUTAneous Q7D           Objective:     Patient Vitals for the past 24 hrs:   Temp Pulse Resp BP SpO2   12/21/17 1118 98 °F (36.7 °C) 83 22 154/66 96 %   12/21/17 0801 99.8 °F (37.7 °C) 80 20 171/66 92 %   12/21/17 0338 100.1 °F (37.8 °C) 77 18 160/76 94 %   12/21/17 0028 99.1 °F (37.3 °C) 72 18 128/66 94 %   12/20/17 2247 - 73 - 134/61 -   12/20/17 2012 97.8 °F (36.6 °C) 69 16 114/55 -   12/20/17 1521 97.1 °F (36.2 °C) 72 18 125/54 97 %        Weight change: 3.493 kg (7 lb 11.2 oz)     12/19 1901 - 12/21 0700  In: 240 [P.O.:200; I.V.:40]  Out: 850 [Urine:850]    Intake/Output Summary (Last 24 hours) at 12/21/17 1314  Last data filed at 12/21/17 1258   Gross per 24 hour   Intake              370 ml   Output              800 ml   Net             -430 ml     Physical Exam: alert, oriented x 3 afebrile    HEENT: non icteric  Neck: No JVD  Cardiovascular: regular no rub  C/L: clear ant/lat  Abdomen: soft + BS  Ext: Bilateral AKA    Data Review:     LABS:   Hematology:   Recent Labs      12/21/17   0355  12/20/17   0315  12/19/17   1050  12/18/17   1951   WBC  4.4*  4.7  5.7  6.7   HGB  8.0*  7.6*  7.6*  7.9*   HCT  24.6*  23.4*  24.4*  26.3*   Pl 200K  Chemistry:   Recent Labs      12/21/17   0355  12/20/17   0315  12/19/17   1728  12/19/17   1050   BUN  74*  80*   --   78*   CREA  6.18*  6.54*   --   6.35*   CA  6.4*  6.1*  6.4*  6.3*   ALB  2.4*   --   2.4*   --    K  4.6  4.7   --   4.0   NA  136  137   --   140   CL  105  105   --   107   CO2  21  21   --   20*   PHOS   --    --    --   4.3   GLU  159*  159*   --   184*    Albumin 2.4  A1C 9.6  SFLC pend  Fe 15 sat 11% benjamin 338  Mag 1.7  IPTH 229 Vit D 25     IMAGES: Renal U/S WNL  CXR + retrocardiac opacity    CULTURE: Blood c/s GP cocci in chains and pairs in 1 aerobic bottle    IMPRESSION AND PLAN:   CKD stage 5 remains asymptomatic some improvement in BUN crea off diuretics, no signs of fluid overload.  Still wants to hold off on dialysis at least after the holidays. 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 SFLC pend  HTN controlled  Fever/bacteremia 2 to pneumonia.   Defer to primary team  Hypocalcemia from [address] [place] [address], [employer] calcitriol         Emma Rolon MD  12/21/2017

## 2017-12-21 NOTE — PROGRESS NOTES
Hospitalist Progress Note    Patient: Marleni Talbert MRN: [id number]  CSN: [id number]    YOB: 1935  Age: 80 y.o. Sex: male    DOA: 12/18/2017 LOS:  LOS: 3 days           blood cx + alpha hemolytic strep 1/4. Repeat blood cx ngtd. CXR yesterday revealed retrocardiac opacity. he states cough is improving. He is able to cough and expectorate sputum, and states he has been spitting it out. Appetite okay. Denies constipation. Denies chest pain. Breathing fine. Assessment/Plan     1. 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 UA and renal US negative. Nephrology input appreciated. 2. Acute respiratory tract infection / pneumonia - did well with SLP. - blood cx (12/18) ngtd. Sputum cx. On ceftri / azithro. 3. sepsis (POA) due to alpha hemolytic strep 1/4 bottles - source m/l respiratory tract infection/pneumonia. Repeat blood cx (12/21) ngtd. abx per ID.   4. 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 Raciel Dumont in the past.   5. 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 Anemia of ckd - on epogen as outpatient per Dr. Jose Juan Small  7. Iron deficiency anemia on venofer as outpatient per Dr. Jose Juan Small  8. DM2 - lantus / ssi / ADA diet. Check A1c.   9. NAGMA in the setting of ARF. On home bicarb tabs  10. Hypertension fairly well controlled. 11. Echo 2/14/17 EF 55%, moderate LVH, mild DD. Mild MR. Trace TR with nl PA pressure. Mild NC. No change from previous 10/28/16. 12. Admitted June 2017 for acute on chronic diastolic CHF - he was discharge on torsemide on alternate days. 13. B.L bka. Hip replacement 2000. 14. ? Coronary artery disease with abnormal stress test in past - follows w/ Dr. Simran Archibald  15. 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 [facility] sent per nephrology. 16. dvt prophylaxis  16. Full code. Per pt, he is at baseline functional status.      Gisselle Espinoza: [phone]    Additional Notes: Case discussed with:  [x]Patient  []Family  [x]Nursing  []Case Management  DVT Prophylaxis:  []Lovenox  [x]Hep SQ  []SCDs  []Coumadin   []On Heparin gtt    Vital signs/Intake and Output:  Visit Vitals    /66 (BP 1 Location: Right arm, BP Patient Position: At rest)    Pulse 80    Temp 99.8 °F (37.7 °C)    Resp 20    Ht 6' (1.829 m)    Wt 77 kg (169 lb 11.2 oz)    SpO2 92%    BMI 23.02 kg/m2     Current Shift:  12/21 0701 - 12/21 1900  In: 250 [P.O.:240; I.V.:10]  Out: -   Last three shifts:  12/19 1901 - 12/21 0700  In: 240 [P.O.:200; I.V.:40]  Out: 850 [Urine:850]    Awake alert and oriented. Productive cough during interview. Ncat. Perrl. RRR  Coarse rhonchi at left base. No wheeze. Soft nt nd nabs  B.L bka, sites clean. No focal deficit  No rash    Medications Reviewed      Labs: Results:       Chemistry Recent Labs      12/21/17 0355 12/20/17 0315 12/19/17   1728  12/19/17   1050   GLU  159*  159*   --   184*   NA  136  137   --   140   K  4.6  4.7   --   4.0   CL  105  105   --   107   CO2  21 21   --   20*   BUN  74*  80*   --   78*   CREA  6.18*  6.54*   --   6.35*   CA  6.4*  6.1*  6.4*  6.3*   AGAP  10  11   --   13   BUCR  12  12   --   12   AP  82   --    --    --    TP  6.3*   --    --    --    ALB  2.4*   --   2.4*   --    GLOB  3.9   --    --    --    AGRAT  0.6*   --    --    --       CBC w/Diff Recent Labs      12/21/17 0355 12/20/17 0315  12/19/17   1050   WBC  4.4*  4.7  5.7   RBC  2.91*  2.74*  2.80*   HGB  8.0*  7.6*  7.6*   HCT  24.6*  23.4*  24.4*   PLT  205  200  195   GRANS  54  50  52   LYMPH  27  25  28   EOS  6*  6*  3      Cardiac Enzymes No results for input(s): CPK, CKND1, ROSA in the last 72 hours. No lab exists for component: CKRMB, TROIP   Coagulation No results for input(s): PTP, INR, APTT in the last 72 hours.     No lab exists for component: INREXT, INREXT    Lipid Panel Lab Results   Component Value Date/Time    Cholesterol, total 78 12/20/2017 03:15 AM    HDL Cholesterol 35 12/20/2017 03:15 AM    LDL, calculated 25.2 12/20/2017 03:15 AM    VLDL, calculated 17.8 12/20/2017 03:15 AM    Triglyceride 89 12/20/2017 03:15 AM    CHOL/HDL Ratio 2.2 12/20/2017 03:15 AM      BNP No results for input(s): BNPP in the last 72 hours. Liver Enzymes Recent Labs      12/21/17   0355   TP  6.3*   ALB  2.4*   AP  82   SGOT  24      Thyroid Studies Lab Results   Component Value Date/Time    TSH 1.94 09/01/2010 11:15 AM        Procedures/imaging: see electronic medical records for all procedures/Xrays and details which were not copied into this note but were reviewed prior to creation of Plan.

## 2017-12-21 NOTE — CDMP QUERY
Please clarify if this patient is being treated/managed for:    =>Pneumonia  =>Other Explanation of clinical findings  =>Unable to Determine (no explanation of clinical findings)    The medical record reflects the following:    Risk:  Dx sepsis and ARF on CKD5  Clinical Indicators:  --81 yo presented to ER for cough with O2 sat 88%, dx hypoxemia  --H&P:  \"cough w/sOB & prod cough - ? CAP-iv abx\"  --12/19 PN  \"2. acute bronchitis\"  --12/21 ID PN \" (sepsis), History is likely suggestive of respiratory tract infection/pneumonia as the source. CXR on admission didn't reveal pneumonia. repeat cxr 12/20 reveals left retrocardiac infiltrate. I will add atypical pneumonia pathogen coverage\"  --12/21 PN:  \"Acute respiratory tract infection - did well with SLP. - blood cx (12/18) ngtd. Sputum cx. On ceftri / azithro\"  Treatment: IV abx    Please clarify and document your clinical opinion in the progress notes and discharge summary including the definitive and/or presumptive diagnosis, (suspected or probable), related to the above clinical findings. Please include clinical findings supporting your diagnosis. If you DECLINE this query or would like to communicate with [facility], please utilize the \"FID3 message box\" at the TOP of the Progress Note on the right.       Thank you,  Rola Hsu RN BSN CCDS [phone]

## 2017-12-21 NOTE — PROGRESS NOTES
Infectious Disease Consultation Note    Requested by: Dr. Gama Pac    Reason: gram positive bacteremia    Current abx Prior abx   Ceftriaxone since 12/18/17      Lines:       Assessment :    80 y.o. male with multiple medical problems including type 2 DM ( last hgbA1C 9.6 on 12/20/17), chronic kidney disease, HTN , HLPD, PVD , chronic Anemia admitted to [facility] on 12/18/17 for further eval of cough , SOB , ARF & Anemia. Now with gram positive bacteremia    Clinical picture consistent with sepsis (POA) due to gram positive bloodstream infection. Exact source of bacteremia unclear. History is likely suggestive of respiratory tract infection/pneumonia as the source. CXR on admission didn't reveal pneumonia. repeat cxr 12/20 reveals left retrocardiac infiltrate. I will add atypical pneumonia pathogen coverage. abx management complicated by h/o vancomycin allergy. Patient is improving off coverage for resistant gram positives. Hence, will not escalate coverage    Recommendations:    1. Continue ceftriaxone  2. Start azithromycin  3. F/u blood cultures 12/20  4. F/u ID of gpc in blood cultures  5. Will switch to po antibiotics in am if repeat blood cultures negative and continued improvement. Advance Care planning: full code: discussed  with patient/surrogate decision maker:Margot Emerson: [phone]     Above plan was discussed in details with patient. Please call me if any further questions or concerns. Will continue to participate in the care of this patient. Subjective:    Feels fine. Improved cough. Patient denies headaches, visual disturbances, sore throat, runny nose, earaches, cp, sob, chills, abdominal pain, diarrhea, burning micturition, pain or weakness in extremities. He denies back pain/flank pain. home Medication List    Details   sodium bicarbonate 650 mg tablet Take  by mouth two (2) times a day. 2 tabs      isosorbide mononitrate ER (IMDUR) 30 mg tablet Take  by mouth daily. calcium-vitamin D (OYSTER SHELL CALCIUM-VIT D3) 500 mg(1,250mg) -200 unit per tablet Take 1 Tab by mouth two (2) times daily (with meals). torsemide (DEMADEX) 20 mg tablet Take  by mouth daily. 2 tabs every other day      glucose 4 gram chewable tablet Take 15 g by mouth as needed. 1-5 tabs as needed      oxyCODONE-acetaminophen (PERCOCET 10)  mg per tablet Take 1 Tab by mouth.      calcium acetate (PHOSLO) 667 mg cap Take  by mouth three (3) times daily (with meals). calcitRIOL (ROCALTROL) 0.25 mcg capsule Take 0.25 mcg by mouth daily. timolol (TIMOPTIC) 0.5 % ophthalmic solution 1 Drop two (2) times a day. hydrALAZINE (APRESOLINE) 10 mg tablet Take 10 mg by mouth. 2 1/2 tabs twice a day      cloNIDine (CATAPRESS) 0.3 mg tablet Take 0.2 mg by mouth two (2) times a day. carvedilol (COREG) 6.25 mg tablet Take 12.5 mg by mouth two (2) times daily (with meals). levobunolol (BETAGAN) 0.5 % ophthalmic solution Administer 1 Drop to both eyes nightly. insulin glargine (LANTUS) 100 unit/mL injection by SubCUTAneous route once. atorvastatin (LIPITOR) 10 mg tablet Take  by mouth daily. aspirin delayed-release 81 mg tablet Take  by mouth daily. insulin lispro (HUMALOG) 100 unit/mL injection by SubCUTAneous route. amLODIPine (NORVASC) 10 mg tablet Take  by mouth daily. ergocalciferol (ERGOCALCIFEROL) 50,000 unit capsule Take 50,000 Units by mouth.  2 times per week             Current Facility-Administered Medications   Medication Dose Route Frequency    calcitRIOL (ROCALTROL) capsule 0.5 mcg  0.5 mcg Oral DAILY    insulin lispro (HUMALOG) injection 3 Units  3 Units SubCUTAneous TIDAC    oxyCODONE-acetaminophen (PERCOCET) 5-325 mg per tablet 1 Tab  1 Tab Oral Q8H PRN    levobunolol (BETAGAN) 0.5 % ophthalmic solution 1 Drop  1 Drop Both Eyes QHS    atorvastatin (LIPITOR) tablet 10 mg  10 mg Oral DAILY    aspirin delayed-release tablet 81 mg  81 mg Oral DAILY    amLODIPine (NORVASC) tablet 10 mg  10 mg Oral DAILY    cloNIDine HCl (CATAPRES) tablet 0.2 mg  0.2 mg Oral BID    carvedilol (COREG) tablet 12.5 mg  12.5 mg Oral BID WITH MEALS    hydrALAZINE (APRESOLINE) tablet 10 mg  10 mg Oral TID    timolol (TIMOPTIC) 0.5 % ophthalmic solution 1 Drop  1 Drop Both Eyes BID    calcium acetate (PHOSLO) capsule 667 mg  1 Cap Oral TID WITH MEALS    sodium bicarbonate tablet 325 mg  325 mg Oral BID    isosorbide mononitrate ER (IMDUR) tablet 30 mg  30 mg Oral DAILY    cefTRIAXone (ROCEPHIN) 1 g in sterile water (preservative free) 10 mL IV syringe  1 g IntraVENous Q24H    acetaminophen (TYLENOL) tablet 650 mg  650 mg Oral Q6H PRN    insulin lispro (HUMALOG) injection   SubCUTAneous AC&HS    glucose chewable tablet 16 g  4 Tab Oral PRN    glucagon (GLUCAGEN) injection 1 mg  1 mg IntraMUSCular PRN    dextrose (D50) infusion 12.5-25 g  25-50 mL IntraVENous PRN    insulin glargine (LANTUS) injection 10 Units  10 Units SubCUTAneous ACD    heparin (porcine) injection 5,000 Units  5,000 Units SubCUTAneous Q8H    magnesium oxide (MAG-OX) tablet 400 mg  400 mg Oral BID    epoetin reny (EPOGEN;PROCRIT) injection 10,000 Units  10,000 Units SubCUTAneous Q7D       Allergies: Vancomycin    Temp (24hrs), Av.4 °F (36.9 °C), Min:96.5 °F (35.8 °C), Max:100.1 °F (37.8 °C)    Visit Vitals    /66 (BP 1 Location: Right arm, BP Patient Position: At rest)    Pulse 80    Temp 99.8 °F (37.7 °C)    Resp 20    Ht 6' (1.829 m)  Comment: prior to AKA    Wt 77 kg (169 lb 11.2 oz)    SpO2 92%    BMI 23.02 kg/m2       ROS: 12 point ROS obtained in details. Pertinent positives as mentioned in HPI,   otherwise negative    Physical Exam:    General: Well developed, well nourished male laying on the bed AAOx3 in no acute distress.     General:   awake alert and oriented   HEENT:  Normocephalic, atraumatic, PERRL, EOMI, no scleral icterus or pallor; no conjunctival hemmohage;  nasal and oral mucous are moist and without evidence of lesions. No thrush. Neck supple, no bruits. Lymph Nodes:   no cervical, axillary or inguinal adenopathy   Lungs:   non-labored, bilaterally clear to auscultation- no crackles wheezes rales or rhonchi   Heart:  RRR, s1 and s2; no  rubs or gallops, no edema, + pedal pulses   Abdomen:  soft, non-distended, active bowel sounds, no hepatomegaly, no splenomegaly. Non-tender   Genitourinary:  deferred   Extremities:   no clubbing, cyanosis; no joint effusions or swelling; Full ROM of all large joints to the upper and lower extremities; muscle mass appropriate for age   Neurologic:  No gross focal sensory abnormalities; 5/5 muscle strength to upper and lower extremities. Speech appropriate.  Cranial nerves intact                        Skin:  No rash or ulcers noted   Back:  no spinal or paraspinal muscle tenderness or rigidity, no CVA tenderness     Psychiatric:  No suicidal or homicidal ideations, appropriate mood and affect         Labs: Results:   Chemistry Recent Labs      12/21/17 0355 12/20/17 0315 12/19/17   1728  12/19/17   1050   GLU  159*  159*   --   184*   NA  136  137   --   140   K  4.6  4.7   --   4.0   CL  105  105   --   107   CO2  21 21   --   20*   BUN  74*  80*   --   78*   CREA  6.18*  6.54*   --   6.35*   CA  6.4*  6.1*  6.4*  6.3*   AGAP  10  11   --   13   BUCR  12  12   --   12   AP  82   --    --    --    TP  6.3*   --    --    --    ALB  2.4*   --   2.4*   --    GLOB  3.9   --    --    --    AGRAT  0.6*   --    --    --       CBC w/Diff Recent Labs      12/21/17   0355 12/20/17   0315  12/19/17   1050   WBC  4.4*  4.7  5.7   RBC  2.91*  2.74*  2.80*   HGB  8.0*  7.6*  7.6*   HCT  24.6*  23.4*  24.4*   PLT  205  200  195   GRANS  54  50  52   LYMPH  27  25  28   EOS  6*  6*  3      Microbiology Recent Labs      12/20/17   1409  12/18/17   1335  12/18/17   1310  12/18/17   1300   CULT  NO GROWTH AFTER 16 HOURS  AEROBIC BOTTLE POSSIBLE STREPTOCOCCI, ALPHA HEMOLYTIC*  NO GROWTH 3 DAYS  >100,000 COLONIES/mL MULTIPLE MICROORGANISMS PRESENT, POSSIBLE CONTAMINATION. PLEASE REPEAT CULTURE IF CLINICALLY INDICATED.           RADIOLOGY:    All available imaging studies/reports in [facility] for this admission were reviewed    Dr. Amanuel Madsen, Infectious Disease Specialist  [phone]  December 21, 2017  12:50 PM

## 2017-12-21 NOTE — PROGRESS NOTES
Problem: Self Care Deficits Care Plan (Adult)  Goal: *Acute Goals and Plan of Care (Insert Text)  Outcome: Resolved/Met Date Met: 12/21/17  Occupational Therapy EVALUATION/discharge    Patient: Marleni Talbert (28 y.o. male)  Date: 12/21/2017  Primary Diagnosis: Febrile illness  Anemia  Renal failure  Anemia  Renal failure  Febrile illness  Community acquired pneumonia        Precautions:   Fall, Skin    ASSESSMENT AND RECOMMENDATIONS:  Based on the objective data described below, the patient presents at his PLOF with basic self care tasks and functional transfers. He has all needed DME for home safety and aides/spouse to assist him daily. Skilled occupational therapy is not indicated at this time. Discharge Recommendations: None  Further Equipment Recommendations for Discharge: N/A      Barriers to Learning/Limitations: None  Compensate with: visual, verbal, tactile, kinesthetic cues/model     COMPLEXITY     Eval Complexity: History: LOW Complexity : Brief history review ; Examination: LOW Complexity : 1-3 performance deficits relating to physical, cognitive , or psychosocial skils that result in activity limitations and / or participation restrictions ; Decision Making:LOW Complexity : No comorbidities that affect functional and no verbal or physical assistance needed to complete eval tasks  Assessment: Low Complexity        G-CODES:     Self Care  Current  CI= 1-19%   Goal  CI= 1-19%   D/C  CI= 1-19%. The severity rating is based on the Level of Assistance required for Functional Mobility and ADLs. SUBJECTIVE:   Patient stated I get dressed in the bed or in the chair.     OBJECTIVE DATA SUMMARY:     Past Medical History:   Diagnosis Date    CAD (coronary artery disease)     Chronic combined systolic and diastolic heart failure ([facility] [address] 3/17/2015    stable limited activity recent admsission     Chronic kidney disease, unspecified 3/17/2015    not on acei/arb     Coronary atherosclerosis of native coronary artery 3/17/2015    abnormal stress test medically managed no angina     Diabetes ([facility] [address]     Diabetes mellitus     Essential hypertension     Essential hypertension, benign     On Hydralazine, Labetalol, clonidine and norvasc HX of ARF and hyperkalemia with ARB; stable    High cholesterol     Hypertension     Other and unspecified hyperlipidemia     On Zocor and Niaspan    Other primary cardiomyopathies 3/17/2015    ef 45%     Peripheral vascular disease, unspecified     Bilateral AKA    Peripheral vascular disease, unspecified     Bilateral AKA     Reflux     Renal insufficiency      Past Surgical History:   Procedure Laterality Date    HX ORTHOPAEDIC      double amputation,  hip replacement    HX PROSTATECTOMY       Prior Level of Function/Home Situation: Pt required setup/supervision with basic self care tasks and used a power wheelchair for functional mobility PTA. Home Situation  Home Environment: Apartment (5th floor, elevator to enter)  # Steps to Enter: 0  One/Two Story Residence: One story  Living Alone: No  Support Systems: Spouse/Significant Other/Partner, Home care staff  Patient Expects to be Discharged to[de-identified] Apartment  Current DME Used/Available at Home: Wheelchair, power, Wheelchair, Other (comment) (has trapeze)  Tub or Shower Type:  (Pt sponge bathes at home.)  [x]     Right hand dominant   []     Left hand dominant  Cognitive/Behavioral Status:  Neurologic State: Alert  Orientation Level: Oriented X4  Cognition: Appropriate decision making; Follows commands  Safety/Judgement: Awareness of environment; Fall prevention    Skin: Intact on UEs    Edema: None noted in UEs    Vision/Perceptual:    Acuity: Within Defined Limits    Corrective Lenses: Reading glasses    Coordination:  Fine Motor Skills-Upper: Left Intact; Right Intact    Gross Motor Skills-Upper: Left Intact; Right Intact    Balance:  Sitting: Intact    Strength:  Strength:  Within functional limits (UEs)    Tone & Sensation:  Tone: Normal (UEs)  Sensation: Intact (UEs)    Range of Motion:  AROM: Within functional limits (UEs)  PROM: Within functional limits (UEs)    Functional Mobility and Transfers for ADLs:  Bed Mobility:  Rolling: Modified independent  Supine to Sit: Modified independent  Sit to Supine: Modified independent  Transfers: Toilet Transfer :  (NT)    ADL Assessment:  Feeding: Independent    Oral Facial Hygiene/Grooming: Independent    Bathing: Setup;Supervision    Upper Body Dressing: Setup    Lower Body Dressing: Setup;Supervision    Toileting: Setup;Supervision    Pain:  Pt reports 0/10 pain or discomfort prior to treatment.    Pt reports 0/10 pain or discomfort post treatment. Activity Tolerance:   Good    Please refer to the flowsheet for vital signs taken during this treatment. After treatment:   []  Patient left in no apparent distress sitting up in chair  [x]  Patient left in no apparent distress in bed  [x]  Call bell left within reach  []  Nursing notified  []  Caregiver present  []  Bed alarm activated    COMMUNICATION/EDUCATION:   Communication/Collaboration:  [x]      Home safety education was provided and the patient/caregiver indicated understanding. [x]      Patient/family have participated as able and agree with findings and recommendations. []      Patient is unable to participate in plan of care at this time.     Danna Middleton MS OTR/L  Time Calculation: 15 mins

## 2017-12-22 VITALS
SYSTOLIC BLOOD PRESSURE: 122 MMHG | RESPIRATION RATE: 16 BRPM | HEIGHT: 72 IN | HEART RATE: 72 BPM | DIASTOLIC BLOOD PRESSURE: 59 MMHG | TEMPERATURE: 97.7 F | WEIGHT: 179.1 LBS | BODY MASS INDEX: 24.26 KG/M2 | OXYGEN SATURATION: 95 %

## 2017-12-22 PROBLEM — J18.9 COMMUNITY ACQUIRED PNEUMONIA: Status: RESOLVED | Noted: 2017-12-19 | Resolved: 2017-12-22

## 2017-12-22 PROBLEM — R50.9 FEBRILE ILLNESS: Status: RESOLVED | Noted: 2017-12-18 | Resolved: 2017-12-22

## 2017-12-22 LAB
ANION GAP SERPL CALC-SCNC: 9 MMOL/L (ref 3–18)
BACTERIA SPEC CULT: ABNORMAL
BASOPHILS # BLD: 0 K/UL (ref 0–0.1)
BASOPHILS NFR BLD: 0 % (ref 0–2)
BUN SERPL-MCNC: 66 MG/DL (ref 7–18)
BUN/CREAT SERPL: 11 (ref 12–20)
CALCIUM SERPL-MCNC: 6.7 MG/DL (ref 8.5–10.1)
CHLORIDE SERPL-SCNC: 109 MMOL/L (ref 100–108)
CO2 SERPL-SCNC: 22 MMOL/L (ref 21–32)
CREAT SERPL-MCNC: 6 MG/DL (ref 0.6–1.3)
DIFFERENTIAL METHOD BLD: ABNORMAL
EOSINOPHIL # BLD: 0.3 K/UL (ref 0–0.4)
EOSINOPHIL NFR BLD: 5 % (ref 0–5)
ERYTHROCYTE [DISTWIDTH] IN BLOOD BY AUTOMATED COUNT: 15.6 % (ref 11.6–14.5)
GLUCOSE BLD STRIP.AUTO-MCNC: 120 MG/DL (ref 70–110)
GLUCOSE BLD STRIP.AUTO-MCNC: 206 MG/DL (ref 70–110)
GLUCOSE SERPL-MCNC: 130 MG/DL (ref 74–99)
GRAM STN SPEC: ABNORMAL
GRAM STN SPEC: ABNORMAL
HCT VFR BLD AUTO: 24.5 % (ref 36–48)
HGB BLD-MCNC: 7.8 G/DL (ref 13–16)
LYMPHOCYTES # BLD: 1.3 K/UL (ref 0.9–3.6)
LYMPHOCYTES NFR BLD: 28 % (ref 21–52)
MCH RBC QN AUTO: 27 PG (ref 24–34)
MCHC RBC AUTO-ENTMCNC: 31.8 G/DL (ref 31–37)
MCV RBC AUTO: 84.8 FL (ref 74–97)
MONOCYTES # BLD: 0.6 K/UL (ref 0.05–1.2)
MONOCYTES NFR BLD: 12 % (ref 3–10)
NEUTS SEG # BLD: 2.6 K/UL (ref 1.8–8)
NEUTS SEG NFR BLD: 55 % (ref 40–73)
PLATELET # BLD AUTO: 213 K/UL (ref 135–420)
PMV BLD AUTO: 10.9 FL (ref 9.2–11.8)
POTASSIUM SERPL-SCNC: 5.1 MMOL/L (ref 3.5–5.5)
RBC # BLD AUTO: 2.89 M/UL (ref 4.7–5.5)
SERVICE CMNT-IMP: ABNORMAL
SODIUM SERPL-SCNC: 140 MMOL/L (ref 136–145)
WBC # BLD AUTO: 4.8 K/UL (ref 4.6–13.2)

## 2017-12-22 PROCEDURE — 74011250636 HC RX REV CODE- 250/636: Performed by: HOSPITALIST

## 2017-12-22 PROCEDURE — 74011636637 HC RX REV CODE- 636/637: Performed by: HOSPITALIST

## 2017-12-22 PROCEDURE — 74011250637 HC RX REV CODE- 250/637: Performed by: INTERNAL MEDICINE

## 2017-12-22 PROCEDURE — 77010033678 HC OXYGEN DAILY

## 2017-12-22 PROCEDURE — 94760 N-INVAS EAR/PLS OXIMETRY 1: CPT

## 2017-12-22 PROCEDURE — 74011636637 HC RX REV CODE- 636/637: Performed by: INTERNAL MEDICINE

## 2017-12-22 PROCEDURE — 80048 BASIC METABOLIC PNL TOTAL CA: CPT | Performed by: HOSPITALIST

## 2017-12-22 PROCEDURE — 82962 GLUCOSE BLOOD TEST: CPT

## 2017-12-22 PROCEDURE — 74011000250 HC RX REV CODE- 250: Performed by: HOSPITALIST

## 2017-12-22 PROCEDURE — 36415 COLL VENOUS BLD VENIPUNCTURE: CPT | Performed by: HOSPITALIST

## 2017-12-22 PROCEDURE — 85025 COMPLETE CBC W/AUTO DIFF WBC: CPT | Performed by: HOSPITALIST

## 2017-12-22 PROCEDURE — 74011250637 HC RX REV CODE- 250/637: Performed by: HOSPITALIST

## 2017-12-22 RX ORDER — FLUTICASONE PROPIONATE 50 MCG
2 SPRAY, SUSPENSION (ML) NASAL DAILY
Qty: 1 BOTTLE | Refills: 1 | Status: SHIPPED | OUTPATIENT
Start: 2017-12-22

## 2017-12-22 RX ORDER — AZITHROMYCIN 250 MG/1
250 TABLET, FILM COATED ORAL DAILY
Qty: 5 TAB | Refills: 0 | Status: SHIPPED | OUTPATIENT
Start: 2017-12-22 | End: 2017-12-27

## 2017-12-22 RX ADMIN — CALCITRIOL 0.5 MCG: 0.25 CAPSULE, LIQUID FILLED ORAL at 09:41

## 2017-12-22 RX ADMIN — CALCIUM ACETATE 667 MG: 667 CAPSULE ORAL at 18:05

## 2017-12-22 RX ADMIN — AMLODIPINE BESYLATE 10 MG: 10 TABLET ORAL at 09:41

## 2017-12-22 RX ADMIN — HYDRALAZINE HYDROCHLORIDE 10 MG: 10 TABLET, FILM COATED ORAL at 18:05

## 2017-12-22 RX ADMIN — TIMOLOL MALEATE 1 DROP: 5 SOLUTION OPHTHALMIC at 13:03

## 2017-12-22 RX ADMIN — ASPIRIN 81 MG: 81 TABLET ORAL at 09:41

## 2017-12-22 RX ADMIN — CARVEDILOL 12.5 MG: 12.5 TABLET, FILM COATED ORAL at 18:05

## 2017-12-22 RX ADMIN — SODIUM BICARBONATE 650 MG TABLET 325 MG: at 19:19

## 2017-12-22 RX ADMIN — HYDRALAZINE HYDROCHLORIDE 10 MG: 10 TABLET, FILM COATED ORAL at 09:41

## 2017-12-22 RX ADMIN — HEPARIN SODIUM 5000 UNITS: 5000 INJECTION, SOLUTION INTRAVENOUS; SUBCUTANEOUS at 18:09

## 2017-12-22 RX ADMIN — INSULIN LISPRO 3 UNITS: 100 INJECTION, SOLUTION INTRAVENOUS; SUBCUTANEOUS at 09:45

## 2017-12-22 RX ADMIN — ATORVASTATIN CALCIUM 10 MG: 10 TABLET, FILM COATED ORAL at 09:41

## 2017-12-22 RX ADMIN — INSULIN GLARGINE 10 UNITS: 100 INJECTION, SOLUTION SUBCUTANEOUS at 18:06

## 2017-12-22 RX ADMIN — CALCIUM ACETATE 667 MG: 667 CAPSULE ORAL at 13:00

## 2017-12-22 RX ADMIN — FLUTICASONE PROPIONATE 2 SPRAY: 50 SPRAY, METERED NASAL at 13:03

## 2017-12-22 RX ADMIN — INSULIN LISPRO 4 UNITS: 100 INJECTION, SOLUTION INTRAVENOUS; SUBCUTANEOUS at 13:01

## 2017-12-22 RX ADMIN — INSULIN LISPRO 3 UNITS: 100 INJECTION, SOLUTION INTRAVENOUS; SUBCUTANEOUS at 18:05

## 2017-12-22 RX ADMIN — CLONIDINE HYDROCHLORIDE 0.2 MG: 0.1 TABLET ORAL at 09:41

## 2017-12-22 RX ADMIN — SODIUM BICARBONATE 650 MG TABLET 325 MG: at 09:42

## 2017-12-22 RX ADMIN — CLONIDINE HYDROCHLORIDE 0.2 MG: 0.1 TABLET ORAL at 19:19

## 2017-12-22 RX ADMIN — HEPARIN SODIUM 5000 UNITS: 5000 INJECTION, SOLUTION INTRAVENOUS; SUBCUTANEOUS at 09:42

## 2017-12-22 RX ADMIN — CARVEDILOL 12.5 MG: 12.5 TABLET, FILM COATED ORAL at 09:42

## 2017-12-22 RX ADMIN — TIMOLOL MALEATE 1 DROP: 5 SOLUTION OPHTHALMIC at 19:21

## 2017-12-22 RX ADMIN — CALCIUM ACETATE 667 MG: 667 CAPSULE ORAL at 09:42

## 2017-12-22 RX ADMIN — INSULIN LISPRO 3 UNITS: 100 INJECTION, SOLUTION INTRAVENOUS; SUBCUTANEOUS at 12:59

## 2017-12-22 RX ADMIN — ISOSORBIDE MONONITRATE 30 MG: 30 TABLET, EXTENDED RELEASE ORAL at 09:41

## 2017-12-22 RX ADMIN — WATER 1 G: 1 INJECTION INTRAMUSCULAR; INTRAVENOUS; SUBCUTANEOUS at 03:42

## 2017-12-22 RX ADMIN — AZITHROMYCIN 500 MG: 250 TABLET, FILM COATED ORAL at 09:41

## 2017-12-22 NOTE — PROGRESS NOTES
Infectious Disease progress Note    Requested by: Dr. Dianna Kovacs    Reason: gram positive bacteremia    Current abx Prior abx   Ceftriaxone since 12/18/17  Azithromycin since 12/21      Lines:       Assessment :    80 y.o. male with multiple medical problems including type 2 DM ( last hgbA1C 9.6 on 12/20/17), chronic kidney disease, HTN , HLPD, PVD , chronic Anemia admitted to [facility] on 12/18/17 for further eval of cough , SOB , ARF & Anemia. Now with gram positive bacteremia    Clinical picture consistent with sepsis (POA) due to LLL CAP. repeat cxr 12/20 reveals left retrocardiac infiltrate. Single positive blood culture for staph sanguis likely contaminant. negative repeat blood cultures 12/20  I will add atypical pneumonia pathogen coverage. abx management complicated by h/o vancomycin allergy. Patient is improving off coverage for resistant gram positives. Hence, will not escalate coverage    Recommendations:    1. discontinue ceftriaxone  2. cont azithromycin for 5 doses  3. Ok to d/c patient from [address] [place] [address]: full code: discussed  with patient/surrogate decision maker:Margot Emerson: [phone]     Above plan was discussed in details with patient. Please call me if any further questions or concerns. Will continue to participate in the care of this patient. Subjective:    Feels fine. Improved cough. Patient denies headaches, visual disturbances, sore throat, runny nose, earaches, cp, sob, chills, abdominal pain, diarrhea, burning micturition, pain or weakness in extremities. He denies back pain/flank pain. home Medication List    Details   sodium bicarbonate 650 mg tablet Take  by mouth two (2) times a day. 2 tabs      isosorbide mononitrate ER (IMDUR) 30 mg tablet Take  by mouth daily. calcium-vitamin D (OYSTER SHELL CALCIUM-VIT D3) 500 mg(1,250mg) -200 unit per tablet Take 1 Tab by mouth two (2) times daily (with meals).       torsemide (DEMADEX) 20 mg tablet Take  by mouth daily. 2 tabs every other day      glucose 4 gram chewable tablet Take 15 g by mouth as needed. 1-5 tabs as needed      oxyCODONE-acetaminophen (PERCOCET 10)  mg per tablet Take 1 Tab by mouth.      calcium acetate (PHOSLO) 667 mg cap Take  by mouth three (3) times daily (with meals). calcitRIOL (ROCALTROL) 0.25 mcg capsule Take 0.25 mcg by mouth daily. timolol (TIMOPTIC) 0.5 % ophthalmic solution 1 Drop two (2) times a day. hydrALAZINE (APRESOLINE) 10 mg tablet Take 10 mg by mouth. 2 1/2 tabs twice a day      cloNIDine (CATAPRESS) 0.3 mg tablet Take 0.2 mg by mouth two (2) times a day. carvedilol (COREG) 6.25 mg tablet Take 12.5 mg by mouth two (2) times daily (with meals). levobunolol (BETAGAN) 0.5 % ophthalmic solution Administer 1 Drop to both eyes nightly. insulin glargine (LANTUS) 100 unit/mL injection by SubCUTAneous route once. atorvastatin (LIPITOR) 10 mg tablet Take  by mouth daily. aspirin delayed-release 81 mg tablet Take  by mouth daily. insulin lispro (HUMALOG) 100 unit/mL injection by SubCUTAneous route. amLODIPine (NORVASC) 10 mg tablet Take  by mouth daily. ergocalciferol (ERGOCALCIFEROL) 50,000 unit capsule Take 50,000 Units by mouth.  2 times per week             Current Facility-Administered Medications   Medication Dose Route Frequency    azithromycin (ZITHROMAX) tablet 500 mg  500 mg Oral DAILY    fluticasone (FLONASE) 50 mcg/actuation nasal spray 2 Spray  2 Spray Both Nostrils DAILY    calcitRIOL (ROCALTROL) capsule 0.5 mcg  0.5 mcg Oral DAILY    insulin lispro (HUMALOG) injection 3 Units  3 Units SubCUTAneous TIDAC    oxyCODONE-acetaminophen (PERCOCET) 5-325 mg per tablet 1 Tab  1 Tab Oral Q8H PRN    levobunolol (BETAGAN) 0.5 % ophthalmic solution 1 Drop  1 Drop Both Eyes QHS    atorvastatin (LIPITOR) tablet 10 mg  10 mg Oral DAILY    aspirin delayed-release tablet 81 mg  81 mg Oral DAILY    amLODIPine (NORVASC) tablet 10 mg  10 mg Oral DAILY    cloNIDine HCl (CATAPRES) tablet 0.2 mg  0.2 mg Oral BID    carvedilol (COREG) tablet 12.5 mg  12.5 mg Oral BID WITH MEALS    hydrALAZINE (APRESOLINE) tablet 10 mg  10 mg Oral TID    timolol (TIMOPTIC) 0.5 % ophthalmic solution 1 Drop  1 Drop Both Eyes BID    calcium acetate (PHOSLO) capsule 667 mg  1 Cap Oral TID WITH MEALS    sodium bicarbonate tablet 325 mg  325 mg Oral BID    isosorbide mononitrate ER (IMDUR) tablet 30 mg  30 mg Oral DAILY    cefTRIAXone (ROCEPHIN) 1 g in sterile water (preservative free) 10 mL IV syringe  1 g IntraVENous Q24H    acetaminophen (TYLENOL) tablet 650 mg  650 mg Oral Q6H PRN    insulin lispro (HUMALOG) injection   SubCUTAneous AC&HS    glucose chewable tablet 16 g  4 Tab Oral PRN    glucagon (GLUCAGEN) injection 1 mg  1 mg IntraMUSCular PRN    dextrose (D50) infusion 12.5-25 g  25-50 mL IntraVENous PRN    insulin glargine (LANTUS) injection 10 Units  10 Units SubCUTAneous ACD    heparin (porcine) injection 5,000 Units  5,000 Units SubCUTAneous Q8H    epoetin reny (EPOGEN;PROCRIT) injection 10,000 Units  10,000 Units SubCUTAneous Q7D       Allergies: Vancomycin    Temp (24hrs), Av.3 °F (36.8 °C), Min:97.5 °F (36.4 °C), Max:98.7 °F (37.1 °C)    Visit Vitals    /66 (BP 1 Location: Right arm, BP Patient Position: At rest)    Pulse 74    Temp 97.5 °F (36.4 °C)    Resp 16    Ht 6' (1.829 m)  Comment: prior to AKA    Wt 81.2 kg (179 lb 1.6 oz)    SpO2 95%    BMI 24.29 kg/m2       ROS: 12 point ROS obtained in details. Pertinent positives as mentioned in HPI,   otherwise negative    Physical Exam:    General: Well developed, well nourished male laying on the bed AAOx3 in no acute distress. General:   awake alert and oriented   HEENT:  Normocephalic, atraumatic, PERRL, EOMI, no scleral icterus or pallor; no conjunctival hemmohage;  nasal and oral mucous are moist and without evidence of lesions.  No thrush. Neck supple, no bruits. Lymph Nodes:   no cervical, axillary or inguinal adenopathy   Lungs:   non-labored, bilaterally clear to auscultation- no crackles wheezes rales or rhonchi   Heart:  RRR, s1 and s2; no  rubs or gallops, no edema, + pedal pulses   Abdomen:  soft, non-distended, active bowel sounds, no hepatomegaly, no splenomegaly. Non-tender   Genitourinary:  deferred   Extremities:   no clubbing, cyanosis; no joint effusions or swelling; Full ROM of all large joints to the upper and lower extremities; muscle mass appropriate for age   Neurologic:  No gross focal sensory abnormalities; 5/5 muscle strength to upper and lower extremities. Speech appropriate.  Cranial nerves intact                        Skin:  No rash or ulcers noted   Back:  no spinal or paraspinal muscle tenderness or rigidity, no CVA tenderness     Psychiatric:  No suicidal or homicidal ideations, appropriate mood and affect         Labs: Results:   Chemistry Recent Labs      12/22/17   0520 12/21/17   0355  12/20/17 0315  12/19/17   1728   GLU  130*  159*  159*   --    NA  140  136  137   --    K  5.1  4.6  4.7   --    CL  109*  105  105   --    CO2  22  21 21   --    BUN  66*  74*  80*   --    CREA  6.00*  6.18*  6.54*   --    CA  6.7*  6.4*  6.1*  6.4*   AGAP  9  10  11   --    BUCR  11*  12  12   --    AP   --   82   --    --    TP   --   6.3*   --    --    ALB   --   2.4*   --   2.4*   GLOB   --   3.9   --    --    AGRAT   --   0.6*   --    --       CBC w/Diff Recent Labs      12/22/17   0520  12/21/17   0355  12/20/17   0315   WBC  4.8  4.4*  4.7   RBC  2.89*  2.91*  2.74*   HGB  7.8*  8.0*  7.6*   HCT  24.5*  24.6*  23.4*   PLT  213  205  200   GRANS  55  54  50   LYMPH  28  27  25   EOS  5  6*  6*      Microbiology Recent Labs      12/20/17   1409   CULT  NO GROWTH 2 DAYS          RADIOLOGY:    All available imaging studies/reports in [facility] for this admission were reviewed    Dr. Nighat Stallworth, Infectious Disease \[facility]\""  [phone]  December 22, 2017  12:50 PM

## 2017-12-22 NOTE — PROGRESS NOTES
ZAYRA NOTE: SW met with the pt to check in with him today. One of the physicians had talked with the pt about dialysis. Pt reported that he and his wife planned to discuss it after the holidays. ZAYRA provided information to the pt about dialysis, as well as, the contact information for two facilities located near his home. SW will be available for discharge purposes.         Cynthia Mcnally, DAVY PEREZW

## 2017-12-22 NOTE — DISCHARGE SUMMARY
Discharge Summary    Patient: Nicole Bain MRN: [id number]  CSN: [id number]    YOB: 1935  Age: 80 y.o.   Sex: male    DOA: 12/18/2017 LOS:  LOS: 4 days   Discharge Date:      Admission Diagnoses: Febrile illness  Anemia  Renal failure  Anemia  Renal failure  Febrile illness  Community acquired pneumonia    Discharge Diagnoses:    Problem List as of 12/22/2017  Date Reviewed: 9/25/2017          Codes Class Noted - Resolved    Renal failure ICD-10-CM: N19  ICD-9-CM: 304  12/18/2017 - Present        Anemia ICD-10-CM: D64.9  ICD-9-CM: 285.9  12/18/2017 - Present        Chronic combined systolic and diastolic [facility] ([facility] ICD-10-CM: I50.42  ICD-9-CM: 428.42  3/17/2015 - Present    Overview Signed 3/17/2015 12:12 PM by Carmen Reyes MD     stable  limited activity  recent admsission             Coronary atherosclerosis of native coronary artery ICD-10-CM: I25.10  ICD-9-CM: 414.01  3/17/2015 - Present    Overview Signed 3/17/2015 12:12 PM by Carmen Reyes MD     abnormal stress test  medically managed  no angina             Cardiomyopathy ([facility] ICD-10-CM: I42.9  ICD-9-CM: 425.4  3/17/2015 - Present    Overview Signed 3/17/2015 12:12 PM by Carmen Reyes MD     ef 45%             Chronic kidney disease, unspecified ICD-10-CM: N18.9  ICD-9-CM: 585.9  3/17/2015 - Present    Overview Signed 3/17/2015 12:13 PM by Carmen Reyes MD     not on acei/arb             Essential hypertension, benign ICD-10-CM: I10  ICD-9-CM: 401.1  Unknown - Present    Overview Signed 11/26/2013 11:45 AM by Margaux Ramirez     On Hydralazine, Labetalol, clonidine and norvasc HX of ARF and hyperkalemia with ARB; stable             Hyperlipidemia ICD-10-CM: E78.5  ICD-9-CM: 272.4  Unknown - Present    Overview Signed 11/26/2013 11:45 AM by Margaux Ramirez     On Zocor and Niaspan             Peripheral vascular disease ([facility] ICD-10-CM: I73.9  ICD-9-CM: 443.9  Unknown - Present    Overview Signed 11/26/2013 11:46 AM by Arnold Foy     Bilateral AKA             RESOLVED: Community acquired pneumonia ICD-10-CM: J18.9  ICD-9-CM: 486  12/19/2017 - 12/22/2017        RESOLVED: Febrile illness ICD-10-CM: R50.9  ICD-9-CM: 780.60  12/18/2017 - 12/22/2017            Reason for Admission  79 yo AA male admitted for several days of non productive cough and SOB. Denied any fever, chest pain, N/V/D. He went to [facility] ER, had elevated Temp of 102.4 and was told he had pneumonia with worsening renal function. He was taken off his demadex, hydrated and sent to [facility] for admission. He follows with Dr Fany Bliss and was last seen in early Nov of this year. most recent creatinine in October was 5.6 ( baseline ). He has also been dx with anemia and on Procrit at the [facility] weekly. Also had BT in the past.  According to office records he has declined to consider dialysis but at the time of admission, he stated he was aware he needs to start but wants to hold off as long as possible. He denied any urinary voiding complaints, appetite remained fair, no significant weight changes. Discharge Condition: Good    PHYSICAL EXAM at discharge. Visit Vitals    /71 (BP 1 Location: Right arm, BP Patient Position: At rest)    Pulse 77    Temp 99.1 °F (37.3 °C)    Resp 17    Ht 6' (1.829 m)    Wt 81.2 kg (179 lb 1.6 oz)    SpO2 95%    BMI 24.29 kg/m2      Awake alert and oriented x4. Ncat. Perrl. RRR  CTA b.l  Soft nt nd nabs  B.L bka, sites clean. No focal deficit  No rash    Hospital Course:   1. 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 UA and renal US negative. Nephrology followed in consult, with close outpatient f/u recommended. 2. Acute respiratory tract infection / pneumonia - did well with SLP. 3. sepsis (POA) due to LLL CAP. repeat cxr 12/20 reveals left retrocardiac infiltrate. Single positive blood culture for staph sanguis likely contaminant. negative repeat blood cultures 12/20. atypical pneumonia pathogen coverage added per ID. abx management complicated by h/o vancomycin allergy. Patient was improving off coverage for resistant gram positives. He received ceftriaxone in house, and was discharged on azithromycin per ID recs. 4. 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 Sancho Dobson in the past.   5. 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 Anemia of ckd - on epogen as outpatient per Dr. Billy Gonsalez  7. Iron deficiency anemia on venofer as outpatient per Dr. Billy Gonsalez  8. DM2 with hyperglycemia - lantus / ssi / ADA diet. A1c 9.6.   9. NAGMA in the setting of ARF / CKD. On home bicarb tabs  10. Hypertension fairly well controlled. 11. Echo 2/14/17 EF 55%, moderate LVH, mild DD. Mild MR. Trace TR with nl PA pressure. Mild ID. No change from previous 10/28/16. 12. Admitted June 2017 for acute on chronic diastolic CHF - he was discharged on torsemide on alternate days. 13. B.L bka. Hip replacement 2000. 14. ? Coronary artery disease with abnormal stress test in past - follows w/ Dr. Lupe Lewis  15. 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 [facility] sent per nephrology. 12. 2ndary hyperparathyroidism of ckd - per nephrology. 17. dvt prophylaxis was given in the form of heparin subcut tid. 18. Full code. Per pt, he is at baseline functional status. He did well with pt and ot.  Discharge to home; patient agrees, all questions answered to the best of my ability.      Darnell Chandler: [phone]    Consults: Nephrology Dr. Gomez [facility]  ID Dr. Faby Delgado: labs:   Recent Results (from the past 24 hour(s))   GLUCOSE, POC    Collection Time: 12/21/17  3:43 PM   Result Value Ref Range    Glucose (POC) 171 (H) 70 - 110 mg/dL   GLUCOSE, POC    Collection Time: 12/21/17 11:11 PM   Result Value Ref Range    Glucose (POC) 99 70 - 110 mg/dL   CBC WITH AUTOMATED DIFF    Collection Time: 12/22/17  5:20 AM   Result Value Ref Range    WBC 4.8 4.6 - 13.2 K/uL    RBC 2.89 (L) 4.70 - 5.50 M/uL    HGB 7.8 (L) 13.0 - 16.0 g/dL    HCT 24.5 (L) 36.0 - 48.0 %    MCV 84.8 74.0 - 97.0 FL    MCH 27.0 24.0 - 34.0 PG    MCHC 31.8 31.0 - 37.0 g/dL    RDW 15.6 (H) 11.6 - 14.5 %    PLATELET 214 535 - 695 K/uL    MPV 10.9 9.2 - 11.8 FL    NEUTROPHILS 55 40 - 73 %    LYMPHOCYTES 28 21 - 52 %    MONOCYTES 12 (H) 3 - 10 %    EOSINOPHILS 5 0 - 5 %    BASOPHILS 0 0 - 2 %    ABS. NEUTROPHILS 2.6 1.8 - 8.0 K/UL    ABS. LYMPHOCYTES 1.3 0.9 - 3.6 K/UL    ABS. MONOCYTES 0.6 0.05 - 1.2 K/UL    ABS. EOSINOPHILS 0.3 0.0 - 0.4 K/UL    ABS.  BASOPHILS 0.0 0.0 - 0.1 K/UL    DF AUTOMATED     METABOLIC PANEL, BASIC    Collection Time: 12/22/17  5:20 AM   Result Value Ref Range    Sodium 140 136 - 145 mmol/L    Potassium 5.1 3.5 - 5.5 mmol/L    Chloride 109 (H) 100 - 108 mmol/L    CO2 22 21 - 32 mmol/L    Anion gap 9 3.0 - 18 mmol/L    Glucose 130 (H) 74 - 99 mg/dL    BUN 66 (H) 7.0 - 18 MG/DL    Creatinine 6.00 (H) 0.6 - 1.3 MG/DL    BUN/Creatinine ratio 11 (L) 12 - 20      GFR est AA 11 (L) >60 ml/min/1.73m2    GFR est non-AA 9 (L) >60 ml/min/1.73m2    Calcium 6.7 (L) 8.5 - 10.1 MG/DL   GLUCOSE, POC    Collection Time: 12/22/17  8:09 AM   Result Value Ref Range    Glucose (POC) 120 (H) 70 - 110 mg/dL   GLUCOSE, POC    Collection Time: 12/22/17 12:02 PM   Result Value Ref Range    Glucose (POC) 206 (H) 70 - 110 mg/dL     All Micro Results     Procedure Component Value Units Date/Time    CULTURE, BLOOD [[id number]] Collected:  12/20/17 1409    Order Status:  Completed Specimen:  Whole Blood from Blood Updated:  12/22/17 1018     Special Requests: NO SPECIAL REQUESTS        Culture result: NO GROWTH 2 DAYS       CULTURE, BLOOD [[id number]] Collected:  12/18/17 1310    Order Status:  Completed Specimen:  Blood from Blood Updated:  12/22/17 1018     Special Requests: NO SPECIAL REQUESTS        Culture result: NO GROWTH 4 DAYS       CULTURE, BLOOD [[id number]]  (Abnormal) (Susceptibility) Collected:  12/18/17 1335    Order Status:  Completed Specimen:  Blood from Blood Updated:  12/22/17 0818     Special Requests: NO SPECIAL REQUESTS        GRAM STAIN         AEROBIC BOTTLE GRAM POSITIVE COCCI IN PAIRS IN CHAINS              SMEAR CALLED TO AND CORRECTLY REPEATED BY: SAMI RN 2S 0856 12/20/17 TO [facility]     Culture result:         AEROBIC BOTTLE STREPTOCOCCUS SANGUINIS (A)    CULTURE, URINE [[id number]] Collected:  12/18/17 1300    Order Status:  Completed Specimen:  Urine from Clean catch Updated:  12/19/17 1035     Special Requests: NO SPECIAL REQUESTS        Culture result:         >100,000 COLONIES/mL MULTIPLE MICROORGANISMS PRESENT, POSSIBLE CONTAMINATION. PLEASE REPEAT CULTURE IF CLINICALLY INDICATED. CULTURE, RESPIRATORY/SPUTUM/BRONCH Henry Dhruv [[id number]] Collected:  12/19/17 1030    Order Status:  Canceled Specimen:  Sputum from Sputum     INFLUENZA A & B AG (RAPID TEST) [[id number]] Collected:  12/18/17 1205    Order Status:  Completed Specimen:  Nasopharyngeal from Nasal washing Updated:  12/18/17 1228     Influenza A Antigen NEGATIVE          A negative result does not exclude influenza virus infection, seasonal or H1N1 due to suboptimal sensitivity. If influenza is circulating in your community, a diagnosis of influenza should be considered based on a patients clinical presentation and empiric antiviral treatment should be considered, if indicated. Influenza B Antigen NEGATIVE            IMAGING  XR Results (most recent):    Results from Hospital Encounter encounter on 12/18/17   XR CHEST PORT   Narrative Chest    Indication: evaluate for pneumonia     Comparison: December 18, 2017    Findings: Single view. Instrumentation stable. No pneumothorax. No pleural  effusion. Bilateral lower lung zone atelectasis with patchy retrocardiac opacity  Cardiomediastinal silhouette and osseous structures grossly unchanged.          Impression Impression: Retrocardiac opacity suspicious for pneumonia in the appropriate  clinical setting.    -      US Results (most recent):    Results from Hospital Encounter encounter on 12/18/17   US RETROPERITONEUM COMP   Narrative PROCEDURE:  Ultrasound Retroperitoneal Scan. CPT code 91185. INDICATION:  Renal failure - chronic, febrile illness. COMPARISON:  CT 3/5/12, ultrasound 3/19/10. FINDINGS:    Right kidney size:  9.2 x 4.7 x 4.9 cm. Left kidney size:  9.2 x 4.7 x 4.3 cm. The renal cortical parenchymal echogenicity is unremarkable. No evidence of  solid or cystic renal mass, hydronephrosis or intrarenal stone is detected in  the kidneys. There are subtle curvilinear echogenic foci in both renal sinuses  which may represent vascular calcifications. The bladder is not optimally distended the prevoid urinary bladder volume is  estimated at 56 mL. No free intraperitoneal fluid is seen. Spleen measures  10.7 x 4.4 x 11.0 cm. The visualized portions of the liver and spleen are  unremarkable. The inferior vena cava and aorta are not well visualized. Impression IMPRESSION:    1. No acute renal findings. 2.  Poor evaluation of the bladder due to contracted state.         EKG Results     Procedure 720 Value Units Date/Time    SCANNED CARDIAC RHYTHM STRIP [[id number]] Collected:  12/26/17 1344    Order Status:  Completed Updated:  12/26/17 1416    SCANNED CARDIAC RHYTHM STRIP [[id number]] Collected:  12/22/17 0823    Order Status:  Completed Updated:  12/22/17 1017    SCANNED CARDIAC RHYTHM STRIP [[id number]] Collected:  12/21/17 1150    Order Status:  Completed Updated:  12/21/17 1238    EKG, 12 LEAD, INITIAL [[id number]] Collected:  12/18/17 1245    Order Status:  Completed Updated:  12/18/17 2027     Ventricular Rate 96 BPM      Atrial Rate 96 BPM      P-R Interval 182 ms      QRS Duration 92 ms      Q-T Interval 364 ms      QTC Calculation (Bezet) 459 ms      Calculated P Axis 52 degrees      Calculated T Axis 105 degrees Diagnosis --     Normal sinus rhythm  T wave abnormality, consider lateral ischemia  Abnormal ECG  When compared with ECG of 08-JUL-2011 10:06,  No significant change was found  Confirmed by Scott Tate ([id number]) on 12/18/2017 8:26:55 PM          Discharge Medications:     Current Discharge Medication List      START taking these medications    Details   fluticasone (FLONASE) 50 mcg/actuation nasal spray 2 Sprays by Both Nostrils route daily. Qty: 1 Bottle, Refills: 1      azithromycin (ZITHROMAX) 250 mg tablet Take 1 Tab by mouth daily for 5 days. Qty: 5 Tab, Refills: 0         CONTINUE these medications which have NOT CHANGED    Details   sodium bicarbonate 650 mg tablet Take  by mouth two (2) times a day. 2 tabs      isosorbide mononitrate ER (IMDUR) 30 mg tablet Take  by mouth daily. glucose 4 gram chewable tablet Take 15 g by mouth as needed. 1-5 tabs as needed      oxyCODONE-acetaminophen (PERCOCET 10)  mg per tablet Take 1 Tab by mouth.      calcium acetate (PHOSLO) 667 mg cap Take  by mouth three (3) times daily (with meals). calcitRIOL (ROCALTROL) 0.25 mcg capsule Take 0.25 mcg by mouth daily. timolol (TIMOPTIC) 0.5 % ophthalmic solution 1 Drop two (2) times a day. hydrALAZINE (APRESOLINE) 10 mg tablet Take 10 mg by mouth. 2 1/2 tabs twice a day      cloNIDine (CATAPRESS) 0.3 mg tablet Take 0.2 mg by mouth two (2) times a day. carvedilol (COREG) 6.25 mg tablet Take 12.5 mg by mouth two (2) times daily (with meals). levobunolol (BETAGAN) 0.5 % ophthalmic solution Administer 1 Drop to both eyes nightly. insulin glargine (LANTUS) 100 unit/mL injection by SubCUTAneous route once. atorvastatin (LIPITOR) 10 mg tablet Take  by mouth daily. aspirin delayed-release 81 mg tablet Take  by mouth daily. insulin lispro (HUMALOG) 100 unit/mL injection by SubCUTAneous route. amLODIPine (NORVASC) 10 mg tablet Take  by mouth daily. ergocalciferol (ERGOCALCIFEROL) 50,000 unit capsule Take 50,000 Units by mouth. 2 times per week         STOP taking these medications       calcium-vitamin D (OYSTER SHELL CALCIUM-VIT D3) 500 mg(1,250mg) -200 unit per tablet Comments:   Reason for Stopping:         torsemide (DEMADEX) 20 mg tablet Comments:   Reason for Stopping:               Activity: Activity as tolerated    Diet: Diabetic Diet and Renal Diet    Wound Care: None needed    Follow-up:   1. Dr Becca Putnam 7 - 10 days  2.  Dr. Billy Gonsalez 2 weeks    Minutes spent on discharge: greater than 30

## 2017-12-22 NOTE — PROGRESS NOTES
RENAL DAILY PROGRESS NOTE    80y M with CKD 5, following for worsening of renal function   IMPRESSION:   Acute on chronic renal failure, creatinine remain on higher side,   Anemia, on epo and iron   Sepsis, gram positive bacteremia on abx, source lung  Metabolic acidosis, on bicarb supplement  Secondary hyperparathyroid , on calcitriol  Elevated phos on phoslo  HTN, well control  Proteinuria, light chain ratio relatively higher but hard to interpret in setting of CKD, relatively low suspicion for myloma   PLAN:    From renal stand point, no urgent indication for dialysis. Continue current treatment, from renal stand point, no further input, plan to follow up outpatient closely. Mr. Marisue Nissen will need to start dialysis in very near future, he understands and agree to follow up outpatient very closely. Subjective:       Complaint:  Overnight events noted  no nausea, vomiting, chest pain, short of breath, cough, seizure.      Current Facility-Administered Medications   Medication Dose Route Frequency    azithromycin (ZITHROMAX) tablet 500 mg  500 mg Oral DAILY    fluticasone (FLONASE) 50 mcg/actuation nasal spray 2 Spray  2 Spray Both Nostrils DAILY    calcitRIOL (ROCALTROL) capsule 0.5 mcg  0.5 mcg Oral DAILY    insulin lispro (HUMALOG) injection 3 Units  3 Units SubCUTAneous TIDAC    oxyCODONE-acetaminophen (PERCOCET) 5-325 mg per tablet 1 Tab  1 Tab Oral Q8H PRN    levobunolol (BETAGAN) 0.5 % ophthalmic solution 1 Drop  1 Drop Both Eyes QHS    atorvastatin (LIPITOR) tablet 10 mg  10 mg Oral DAILY    aspirin delayed-release tablet 81 mg  81 mg Oral DAILY    amLODIPine (NORVASC) tablet 10 mg  10 mg Oral DAILY    cloNIDine HCl (CATAPRES) tablet 0.2 mg  0.2 mg Oral BID    carvedilol (COREG) tablet 12.5 mg  12.5 mg Oral BID WITH MEALS    hydrALAZINE (APRESOLINE) tablet 10 mg  10 mg Oral TID    timolol (TIMOPTIC) 0.5 % ophthalmic solution 1 Drop  1 Drop Both Eyes BID    calcium acetate (PHOSLO) capsule 667 mg  1 Cap Oral TID WITH MEALS    sodium bicarbonate tablet 325 mg  325 mg Oral BID    isosorbide mononitrate ER (IMDUR) tablet 30 mg  30 mg Oral DAILY    cefTRIAXone (ROCEPHIN) 1 g in sterile water (preservative free) 10 mL IV syringe  1 g IntraVENous Q24H    acetaminophen (TYLENOL) tablet 650 mg  650 mg Oral Q6H PRN    insulin lispro (HUMALOG) injection   SubCUTAneous AC&HS    glucose chewable tablet 16 g  4 Tab Oral PRN    glucagon (GLUCAGEN) injection 1 mg  1 mg IntraMUSCular PRN    dextrose (D50) infusion 12.5-25 g  25-50 mL IntraVENous PRN    insulin glargine (LANTUS) injection 10 Units  10 Units SubCUTAneous ACD    heparin (porcine) injection 5,000 Units  5,000 Units SubCUTAneous Q8H    epoetin reny (EPOGEN;PROCRIT) injection 10,000 Units  10,000 Units SubCUTAneous Q7D       Review of Symptoms: comprehensive ROS negative except above. Objective:   Patient Vitals for the past 24 hrs:   Temp Pulse Resp BP SpO2   12/22/17 0752 97.5 °F (36.4 °C) 74 16 144/66 95 %   12/22/17 0316 98.7 °F (37.1 °C) 71 17 153/69 93 %   12/22/17 0125 - 71 - 140/61 -   12/21/17 2343 98.2 °F (36.8 °C) 79 17 171/65 96 %   12/21/17 2234 - 72 - 129/63 -   12/21/17 2032 98.6 °F (37 °C) 70 18 131/60 92 %   12/21/17 1839 98.5 °F (36.9 °C) 70 18 133/62 97 %   12/21/17 1540 98.5 °F (36.9 °C) 70 18 134/64 93 %   12/21/17 1118 98 °F (36.7 °C) 83 22 154/66 96 %        Weight change: 4.264 kg (9 lb 6.4 oz)     12/20 1901 - 12/22 0700  In: 649 [P.O.:720;  I.V.:10]  Out: 200 [Urine:200]    Intake/Output Summary (Last 24 hours) at 12/22/17 1022  Last data filed at 12/22/17 0630   Gross per 24 hour   Intake              480 ml   Output                0 ml   Net              480 ml     Physical Exam:   General: comfortable, no acute distress   HEENT  no thyromegaly  CVS: S1S2 heard,  no rub  RS: + air entry b/l,   Abd: Soft, Non tender,  Neuro: non focal, awake, alert ,   Extrm:b/l amputee  Skin: no visible Rash          Data Review:     LABS:   Hematology: Recent Labs      12/22/17   0520  12/21/17   0355  12/20/17   0315  12/19/17   1050   WBC  4.8  4.4*  4.7  5.7   HGB  7.8*  8.0*  7.6*  7.6*   HCT  24.5*  24.6*  23.4*  24.4*     Chemistry: Recent Labs      12/22/17   0520  12/21/17   0355  12/20/17 0315  12/19/17   1728  12/19/17   1050   BUN  66*  74*  80*   --   78*   CREA  6.00*  6.18*  6.54*   --   6.35*   CA  6.7*  6.4*  6.1*  6.4*  6.3*   ALB   --   2.4*   --   2.4*   --    K  5.1  4.6  4.7   --   4.0   NA  140  136  137   --   140   CL  109*  105  105   --   107   CO2  22  21  21   --   20*   PHOS   --    --    --    --   4.3   GLU  130*  159*  159*   --   184*              Procedures/imaging: see electronic medical records for all procedures, Xrays and details which were not copied into this note but were reviewed prior to creation of Plan          Assessment & Plan:     As above         Michael Jessica MD  12/22/2017  10:22 AM

## 2017-12-22 NOTE — PROGRESS NOTES
Problem: Falls - Risk of  Goal: *Absence of Falls  Document Hudson Fall Risk and appropriate interventions in the flowsheet.    Outcome: Progressing Towards Goal  Fall Risk Interventions:  Mobility Interventions: Patient to call before getting OOB, Bed/chair exit alarm         Medication Interventions: Patient to call before getting OOB, Teach patient to arise slowly    Elimination Interventions: Call light in reach, Patient to call for help with toileting needs

## 2017-12-22 NOTE — ROUTINE PROCESS
Bedside and Verbal shift change report given to Simone Snider (oncoming nurse) by Orlando Motta (offgoing nurse). Report included the following information SBAR, Kardex, Recent Results and Cardiac Rhythm NSR.

## 2017-12-22 NOTE — ROUTINE PROCESS
Bedside and Verbal shift change report given to Denver Haskell (oncoming nurse) by Anjali Tinsley (offgoing nurse). Report included the following information SBAR, Kardex and MAR.

## 2017-12-22 NOTE — DISCHARGE INSTRUCTIONS
Pneumonia: Care Instructions  Your Care Instructions    Pneumonia is an infection of the lungs. Most cases are caused by infections from bacteria or viruses. Pneumonia may be mild or very severe. If it is caused by bacteria, you will be treated with antibiotics. It may take a few weeks to a few months to recover fully from pneumonia, depending on how sick you were and whether your overall health is good. Follow-up care is a key part of your treatment and safety. Be sure to make and go to all appointments, and call your doctor if you are having problems. It's also a good idea to know your test results and keep a list of the medicines you take. How can you care for yourself at home? · Take your antibiotics exactly as directed. Do not stop taking the medicine just because you are feeling better. You need to take the full course of antibiotics. · Take your medicines exactly as prescribed. Call your doctor if you think you are having a problem with your medicine. · Get plenty of rest and sleep. You may feel weak and tired for a while, but your energy level will improve with time. · To prevent dehydration, drink plenty of fluids, enough so that your urine is light yellow or clear like water. Choose water and other caffeine-free clear liquids until you feel better. If you have kidney, heart, or liver disease and have to limit fluids, talk with your doctor before you increase the amount of fluids you drink. · Take care of your cough so you can rest. A cough that brings up mucus from your lungs is common with pneumonia. It is one way your body gets rid of the infection. But if coughing keeps you from resting or causes severe fatigue and chest-wall pain, talk to your doctor. He or she may suggest that you take a medicine to reduce the cough. · Use a vaporizer or humidifier to add moisture to your bedroom. Follow the directions for cleaning the machine. · Do not smoke or allow others to smoke around you.  Smoke will make your cough last longer. If you need help quitting, talk to your doctor about stop-smoking programs and medicines. These can increase your chances of quitting for good. · Take an over-the-counter pain medicine, such as acetaminophen (Tylenol), ibuprofen (Advil, Motrin), or naproxen (Aleve). Read and follow all instructions on the label. · Do not take two or more pain medicines at the same time unless the doctor told you to. Many pain medicines have acetaminophen, which is Tylenol. Too much acetaminophen (Tylenol) can be harmful. · If you were given a spirometer to measure how well your lungs are working, use it as instructed. This can help your doctor tell how your recovery is going. · To prevent pneumonia in the future, talk to your doctor about getting a flu vaccine (once a year) and a pneumococcal vaccine (one time only for most people). When should you call for help? Call 911 anytime you think you may need emergency care. For example, call if:  ? · You have severe trouble breathing. ?Call your doctor now or seek immediate medical care if:  ? · You cough up dark brown or bloody mucus (sputum). ? · You have new or worse trouble breathing. ? · You are dizzy or lightheaded, or you feel like you may faint. ? Watch closely for changes in your health, and be sure to contact your doctor if:  ? · You have a new or higher fever. ? · You are coughing more deeply or more often. ? · You are not getting better after 2 days (48 hours). ? · You do not get better as expected. Where can you learn more? Go to http://aletha-marino.info/. Enter [id number] in the search box to learn more about \"Pneumonia: Care Instructions. \"  Current as of: May 12, 2017  Content Version: 11.4  © 9381-3286 [employer]. Care instructions adapted under license by [employer] (which disclaims liability or warranty for this information).  If you have questions about a medical condition or this instruction, always ask your healthcare professional. [place] [address] any warranty or liability for your use of this information. Acute Kidney Injury: Care Instructions  Your Care Instructions    Acute kidney injury (NALINI) is a sudden decrease in kidney function. This can happen over a period of hours, days or, in some cases, weeks. NALINI used to be called acute renal failure, but kidney failure doesn't always happen with NALINI. Common causes of NALINI are dehydration, blood loss, and medicines. When NALINI happens, the kidneys have trouble removing waste and excess fluids from the body. The waste and fluids build up and become harmful. Kidney function may return to normal if the cause of NALINI is treated quickly. Your chance of a full recovery depends on what caused the problem, how quickly the cause was treated, and what other medical problems you have. A machine may be used to help your kidneys remove waste and fluids for a short period of time. This is called dialysis. Follow-up care is a key part of your treatment and safety. Be sure to make and go to all appointments, and call your doctor if you are having problems. It's also a good idea to know your test results and keep a list of the medicines you take. How can you care for yourself at home? · Talk to your doctor about how much fluid you should drink. · Eat a balanced diet. Talk to your doctor or a dietitian about what type of diet may be best for you. You may need to limit sodium, potassium, and phosphorus. · If you need dialysis, follow the instructions and schedule for dialysis that your doctor gives you. · Do not smoke. Smoking can make your condition worse. If you need help quitting, talk to your doctor about stop-smoking programs and medicines. These can increase your chances of quitting for good. · Do not drink alcohol. · Review all of your medicines with your doctor.  Do not take any medicines, including nonsteroidal anti-inflammatory drugs (NSAIDs) such as ibuprofen (Advil, Motrin) or naproxen (Aleve), unless your doctor says it is safe for you to do so. · Make sure that anyone treating you for any health problem knows that you have had NALINI. When should you call for help? Call 911 anytime you think you may need emergency care. For example, call if:  ? · You passed out (lost consciousness). ?Call your doctor now or seek immediate medical care if:  ? · You have new or worse nausea and vomiting. ? · You have much less urine than normal, or you have no urine. ? · You are feeling confused or cannot think clearly. ? · You have new or more blood in your urine. ? · You have new swelling. ? · You are dizzy or lightheaded, or feel like you may faint. ? Watch closely for changes in your health, and be sure to contact your doctor if:  ? · You do not get better as expected. Where can you learn more? Go to http://aletha-marino.info/. Enter F239 in the search box to learn more about \"Acute Kidney Injury: Care Instructions. \"  Current as of: May 12, 2017  Content Version: 11.4  © 3103-4207 [employer]. Care instructions adapted under license by [employer] (which disclaims liability or warranty for this information). If you have questions about a medical condition or this instruction, always ask your healthcare professional. [place] [address] any warranty or liability for your use of this information. Anemia: Care Instructions  Your Care Instructions    Anemia is a low level of red blood cells, which carry oxygen throughout your body. Many things can cause anemia. Lack of iron is one of the most common causes. Your body needs iron to make hemoglobin, a substance in red blood cells that carries oxygen from the lungs to your body's cells. Without enough iron, the body produces fewer and smaller red blood cells.  As a result, your body's cells do not get enough oxygen, and you feel tired and weak. And you may have trouble concentrating. Bleeding is the most common cause of a lack of iron. You may have heavy menstrual bleeding or bleeding caused by conditions such as ulcers, hemorrhoids, or cancer. Regular use of aspirin or other anti-inflammatory medicines (such as ibuprofen) also can cause bleeding in some people. A lack of iron in your diet also can cause anemia, especially at times when the body needs more iron, such as during pregnancy, infancy, and the teen years. Your doctor may have prescribed iron pills. It may take several months of treatment for your iron levels to return to normal. Your doctor also may suggest that you eat foods that are rich in iron, such as meat and beans. There are many other causes of anemia. It is not always due to a lack of iron. Finding the specific cause of your anemia will help your doctor find the right treatment for you. Follow-up care is a key part of your treatment and safety. Be sure to make and go to all appointments, and call your doctor if you are having problems. It's also a good idea to know your test results and keep a list of the medicines you take. How can you care for yourself at home? · Take your medicines exactly as prescribed. Call your doctor if you think you are having a problem with your medicine. · If your doctor recommends iron pills, take them as directed:  ¨ Try to take the pills on an empty stomach about 1 hour before or 2 hours after meals. But you may need to take iron with food to avoid an upset stomach. ¨ Do not take antacids or drink milk or caffeine drinks (such as coffee, tea, or cola) at the same time or within 2 hours of the time that you take your iron. They can make it hard for your body to absorb the iron. ¨ Vitamin C (from food or supplements) helps your body absorb iron.  Try taking iron pills with a glass of orange juice or some other food that is high in vitamin C, such as citrus fruits. ¨ Iron pills may cause stomach problems, such as heartburn, nausea, diarrhea, constipation, and cramps. Be sure to drink plenty of fluids, and include fruits, vegetables, and fiber in your diet each day. Iron pills often make your bowel movements dark or green. ¨ If you forget to take an iron pill, do not take a double dose of iron the next time you take a pill. ¨ Keep iron pills out of the reach of small children. An overdose of iron can be very dangerous. · Follow your doctor's advice about eating iron-rich foods. These include red meat, shellfish, poultry, eggs, beans, raisins, whole-grain bread, and leafy green vegetables. · Steam vegetables to help them keep their iron content. When should you call for help? Call 911 anytime you think you may need emergency care. For example, call if:  ? · You have symptoms of a heart attack. These may include:  ¨ Chest pain or pressure, or a strange feeling in the chest.  ¨ Sweating. ¨ Shortness of breath. ¨ Nausea or vomiting. ¨ Pain, pressure, or a strange feeling in the back, neck, jaw, or upper belly or in one or both shoulders or arms. ¨ Lightheadedness or sudden weakness. ¨ A fast or irregular heartbeat. After you call 911, the  may tell you to chew 1 adult-strength or 2 to 4 low-dose aspirin. Wait for an ambulance. Do not try to drive yourself. ? · You passed out (lost consciousness). ?Call your doctor now or seek immediate medical care if:  ? · You have new or increased shortness of breath. ? · You are dizzy or lightheaded, or you feel like you may faint. ? · Your fatigue and weakness continue or get worse. ? · You have any abnormal bleeding, such as:  ¨ Nosebleeds. ¨ Vaginal bleeding that is different (heavier, more frequent, at a different time of the month) than what you are used to. ¨ Bloody or black stools, or rectal bleeding. ¨ Bloody or pink urine. ? Watch closely for changes in your health, and be sure to contact your doctor if:  ? · You do not get better as expected. Where can you learn more? Go to http://aletha-marino.info/. Enter R301 in the search box to learn more about \"Anemia: Care Instructions. \"  Current as of: October 13, 2016  Content Version: 11.4  © 6151-2774 [employer]. Care instructions adapted under license by [employer] (which disclaims liability or warranty for this information). If you have questions about a medical condition or this instruction, always ask your healthcare professional. [address] any warranty or liability for your use of this information. Patient armband removed and shredded      DISCHARGE SUMMARY from Nurse    PATIENT INSTRUCTIONS:    After general anesthesia or intravenous sedation, for 24 hours or while taking prescription Narcotics:  · Limit your activities  · Do not drive and operate hazardous machinery  · Do not make important personal or business decisions  · Do  not drink alcoholic beverages  · If you have not urinated within 8 hours after discharge, please contact your surgeon on call. Report the following to your surgeon:  · Excessive pain, swelling, redness or odor of or around the surgical area  · Temperature over 100.5  · Nausea and vomiting lasting longer than 4 hours or if unable to take medications  · Any signs of decreased circulation or nerve impairment to extremity: change in color, persistent  numbness, tingling, coldness or increase pain  · Any questions    What to do at Home:  Recommended activity: Activity as tolerated    If you experience any of the following symptoms Nausea, vomiting, diarrhea, fever greater than 100.5, dizziness, severe headache, shortness of breath, chest pain, increased pain, please follow up with PCP. *  Please give a list of your current medications to your Primary Care Provider.     *  Please update this list whenever your medications are discontinued, doses are changed, or new medications (including over-the-counter products) are added. *  Please carry medication information at all times in case of emergency situations. These are general instructions for a healthy lifestyle:    No smoking/ No tobacco products/ Avoid exposure to second hand smoke  Surgeon General's Warning:  Quitting smoking now greatly reduces serious risk to your health. Obesity, smoking, and sedentary lifestyle greatly increases your risk for illness    A healthy diet, regular physical exercise & weight monitoring are important for maintaining a healthy lifestyle    You may be retaining fluid if you have a history of heart failure or if you experience any of the following symptoms:  Weight gain of 3 pounds or more overnight or 5 pounds in a week, increased swelling in our hands or feet or shortness of breath while lying flat in bed. Please call your doctor as soon as you notice any of these symptoms; do not wait until your next office visit. Recognize signs and symptoms of STROKE:    F-face looks uneven    A-arms unable to move or move unevenly    S-speech slurred or non-existent    T-time-call 911 as soon as signs and symptoms begin-DO NOT go       Back to bed or wait to see if you get better-TIME IS BRAIN. Warning Signs of HEART ATTACK     Call 911 if you have these symptoms:   Chest discomfort. Most heart attacks involve discomfort in the center of the chest that lasts more than a few minutes, or that goes away and comes back. It can feel like uncomfortable pressure, squeezing, fullness, or pain.  Discomfort in other areas of the upper body. Symptoms can include pain or discomfort in one or both arms, the back, neck, jaw, or stomach.  Shortness of breath with or without chest discomfort.  Other signs may include breaking out in a cold sweat, nausea, or lightheadedness. Don't wait more than five minutes to call 911 - MINUTES MATTER! Fast action can save your life.  Calling 911 is almost always the fastest way to get lifesaving treatment. Emergency Medical Services staff can begin treatment when they arrive -- up to an hour sooner than if someone gets to the hospital by car. The discharge information has been reviewed with the patient. The patient verbalized understanding. Discharge medications reviewed with the patient and appropriate educational materials and side effects teaching were provided.   ___________________________________________________________________________________________________________________________________

## 2017-12-23 NOTE — PROGRESS NOTES
D/C pt home, explained d/c instructions and who he needs follow-up care with. Removed IV and will keep tele box until family arrives. Transportation being set up by family for tonight. No c/o of pain at this time noted and all questions answered.

## 2017-12-24 LAB
BACTERIA SPEC CULT: NORMAL
SERVICE CMNT-IMP: NORMAL

## 2017-12-26 LAB
BACTERIA SPEC CULT: NORMAL
SERVICE CMNT-IMP: NORMAL

## 2018-02-20 ENCOUNTER — OFFICE VISIT (OUTPATIENT)
Dept: ORTHOPEDIC SURGERY | Age: 83
End: 2018-02-20

## 2018-02-20 VITALS
BODY MASS INDEX: 21.7 KG/M2 | DIASTOLIC BLOOD PRESSURE: 60 MMHG | WEIGHT: 160 LBS | SYSTOLIC BLOOD PRESSURE: 161 MMHG | HEART RATE: 87 BPM

## 2018-02-20 DIAGNOSIS — M18.0 PRIMARY OSTEOARTHRITIS OF BOTH FIRST CARPOMETACARPAL JOINTS: Primary | ICD-10-CM

## 2018-02-20 DIAGNOSIS — M18.0 ARTHRITIS OF CARPOMETACARPAL (CMC) JOINTS OF BOTH THUMBS: ICD-10-CM

## 2018-02-20 DIAGNOSIS — G56.01 CARPAL TUNNEL SYNDROME OF RIGHT WRIST: ICD-10-CM

## 2018-02-20 RX ORDER — TORSEMIDE 20 MG/1
TABLET ORAL DAILY
COMMUNITY

## 2018-02-20 RX ORDER — OMEPRAZOLE 20 MG/1
20 CAPSULE, DELAYED RELEASE ORAL DAILY
COMMUNITY
End: 2018-09-10

## 2018-02-20 RX ORDER — TRIAMCINOLONE ACETONIDE 40 MG/ML
40 INJECTION, SUSPENSION INTRA-ARTICULAR; INTRAMUSCULAR ONCE
Qty: 1 ML | Refills: 0
Start: 2018-02-20 | End: 2018-02-20

## 2018-02-20 NOTE — PROGRESS NOTES
HISTORY OF PRESENT ILLNESS: Mr. Janel Saleh is an 80-year-old, diabetic patient who is here for consultation regarding bilateral hand symptoms. He is a long-time patient of Dr. Kirt Solis who has done a Cortisone injection to his hands before. He does not remember exactly where. He does have some x-rays which are reviewed today. He is status post a left carpal tunnel release surgery many years ago, which did help his numbness in that hand at that time. He also has paresthesias in the right hand, but is deemed a nonsurgical candidate in view of his diabetes. 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 He notes just diffuse pain in both hands. With reference to his left hand he points more to the region of the basal joint of the left thumb however. PHYSICAL EXAMINATION:  Reveals an elderly, 80year-old gentleman in minimal discomfort. With reference to the left hand he has some swelling and fullness over the basilar joint of the left thumb CMC joint. He has a positive compression test across this joint. He does have slight hyperextension of the IP joint of this thumb when he abducts the [facility] joint. He has only fair  strength of the left hand. He has a well-healed carpal tunnel incision volarly. With reference to his right hand, he has fairly significant thenar atrophy of the right hand. He has poor  strength of the right hand. He has mild palpable tenderness of the right thumb CMC joint as well as a moderately positive compression test across this joint. He states however the paresthesias in the right hand bother him more than the arthritis pain. RADIOGRAPHS:  Previous x-rays were reviewed and reveal moderate osteoarthritis of the [facility] joint of both hands and thumbs, left slightly worse than the right. He has diffuse osteoarthritis of the small joints of both hands.       PROCEDURE:  Under sterile technique with the patients permission and a time-out, the left thumb CMC joint is injected with Kenalog as well as the right carpal tunnel with Kenalog. The patient tolerated the procedures well. IMPRESSION:   1. 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 2. Right carpal tunnel syndrome. RECOMMENDATIONS:  This patient is not a surgical candidate. I did recommend a Cortisone injection as these have helped in the past.  He will let me know if these do not help him in the future. Again he is deemed a nonsurgical candidate. All of his and his wifes questions were answered today. Vitals:    02/20/18 1255   BP: 161/60   Pulse: 87   Weight: 72.6 kg (160 lb)   PainSc:  10 - Worst pain ever   PainLoc: Hand       Patient Active Problem List   Diagnosis Code    Essential hypertension, benign I10    Hyperlipidemia E78.5    Peripheral vascular disease ([facility] I73.9    Chronic combined systolic and diastolic heart failure (HCC) I50.42    Coronary atherosclerosis of native coronary artery I25.10    Cardiomyopathy ([facility] [address] I42.9    Chronic kidney disease, unspecified N18.9    Renal failure N19    Anemia D64.9     Patient Active Problem List    Diagnosis Date Noted    Renal failure 12/18/2017    Anemia 12/18/2017    Chronic combined systolic and diastolic heart failure ([facility] [address] 03/17/2015    Coronary atherosclerosis of native coronary artery 03/17/2015    Cardiomyopathy ([email or web address] 03/17/2015    Chronic kidney disease, unspecified 03/17/2015    Essential hypertension, benign     Hyperlipidemia     Peripheral vascular disease (HCC)      Current Outpatient Prescriptions   Medication Sig Dispense Refill    torsemide (DEMADEX) 20 mg tablet Take  by mouth daily.  omeprazole (PRILOSEC) 20 mg capsule Take 20 mg by mouth daily.  sodium bicarbonate 650 mg tablet Take  by mouth two (2) times a day. 2 tabs      isosorbide mononitrate ER (IMDUR) 30 mg tablet Take  by mouth daily.  glucose 4 gram chewable tablet Take 15 g by mouth as needed.  1-5 tabs as needed  calcium acetate (PHOSLO) 667 mg cap Take  by mouth three (3) times daily (with meals).  calcitRIOL (ROCALTROL) 0.25 mcg capsule Take 0.25 mcg by mouth daily.  hydrALAZINE (APRESOLINE) 10 mg tablet Take 10 mg by mouth. 2 1/2 tabs twice a day      cloNIDine (CATAPRESS) 0.3 mg tablet Take 0.2 mg by mouth two (2) times a day.  carvedilol (COREG) 6.25 mg tablet Take 12.5 mg by mouth two (2) times daily (with meals).  levobunolol (BETAGAN) 0.5 % ophthalmic solution Administer 1 Drop to both eyes nightly.  insulin glargine (LANTUS) 100 unit/mL injection by SubCUTAneous route once.  atorvastatin (LIPITOR) 10 mg tablet Take  by mouth daily.  aspirin delayed-release 81 mg tablet Take  by mouth daily.  ergocalciferol (ERGOCALCIFEROL) 50,000 unit capsule Take 50,000 Units by mouth. 2 times per week      insulin lispro (HUMALOG) 100 unit/mL injection by SubCUTAneous route.  amLODIPine (NORVASC) 10 mg tablet Take  by mouth daily.  fluticasone (FLONASE) 50 mcg/actuation nasal spray 2 Sprays by Both Nostrils route daily. 1 Bottle 1    oxyCODONE-acetaminophen (PERCOCET 10)  mg per tablet Take 1 Tab by mouth.  timolol (TIMOPTIC) 0.5 % ophthalmic solution 1 Drop two (2) times a day.        Allergies   Allergen Reactions    Vancomycin Vertigo     Past Medical History:   Diagnosis Date    CAD (coronary artery disease)     Chronic combined systolic and diastolic heart failure ([address] 3/17/2015    stable limited activity recent admsission     Chronic kidney disease, unspecified 3/17/2015    not on acei/arb     Coronary atherosclerosis of native coronary artery 3/17/2015    abnormal stress test medically managed no angina     Diabetes ([address]     Diabetes mellitus     Essential hypertension     Essential hypertension, benign     On Hydralazine, Labetalol, clonidine and norvasc HX of ARF and hyperkalemia with ARB; stable    High cholesterol     Hypertension     Other and unspecified hyperlipidemia     On Zocor and Niaspan    Other primary cardiomyopathies 3/17/2015    ef 45%     Peripheral vascular disease, unspecified     Bilateral AKA    Peripheral vascular disease, unspecified     Bilateral AKA     Reflux     Renal insufficiency      Past Surgical History:   Procedure Laterality Date    HX ORTHOPAEDIC      double amputation,  hip replacement    HX PROSTATECTOMY       Family History   Problem Relation Age of Onset    Diabetes Mother     Heart Disease Mother     Cancer Other      Social History   Substance Use Topics    Smoking status: Former Smoker     Packs/day: 2.00     Quit date: 2/27/1998    Smokeless tobacco: Never Used    Alcohol use No

## 2018-03-15 ENCOUNTER — OFFICE VISIT (OUTPATIENT)
Dept: CARDIOLOGY CLINIC | Age: 83
End: 2018-03-15

## 2018-03-15 VITALS — DIASTOLIC BLOOD PRESSURE: 62 MMHG | HEART RATE: 91 BPM | SYSTOLIC BLOOD PRESSURE: 156 MMHG | HEIGHT: 72 IN

## 2018-03-15 DIAGNOSIS — I50.42 CHRONIC COMBINED SYSTOLIC AND DIASTOLIC HEART FAILURE (HCC): Primary | ICD-10-CM

## 2018-03-15 DIAGNOSIS — I10 ESSENTIAL HYPERTENSION, BENIGN: ICD-10-CM

## 2018-03-15 DIAGNOSIS — I25.10 ATHEROSCLEROSIS OF NATIVE CORONARY ARTERY OF NATIVE HEART WITHOUT ANGINA PECTORIS: ICD-10-CM

## 2018-03-15 DIAGNOSIS — N18.5 STAGE 5 CHRONIC KIDNEY DISEASE NOT ON CHRONIC DIALYSIS (HCC): ICD-10-CM

## 2018-03-15 DIAGNOSIS — E78.5 HYPERLIPIDEMIA, UNSPECIFIED HYPERLIPIDEMIA TYPE: ICD-10-CM

## 2018-03-15 NOTE — MR AVS SNAPSHOT
[address] [facility] 87 200 [facility] 
[phone] Patient: Radha Hernandez MRN: [id number] YEO:[birth year] Visit Information Date & Time Provider Department Dept. Phone Encounter #  
 3/15/2018 11:00 AM July Blair MD [facility] [phone] [id number] Follow-up Instructions Return in about 6 months (around 9/15/2018). Upcoming Health Maintenance Date Due DTaP/Tdap/Td series (1 - Tdap) 10/15/1956 ZOSTER VACCINE AGE 60> 8/15/1995 GLAUCOMA SCREENING Q2Y 10/15/2000 Pneumococcal 65+ High/Highest Risk (1 of 2 - PCV13) 10/15/2000 MEDICARE YEARLY EXAM 10/15/2000 Influenza Age 5 to Adult 8/1/2017 Allergies as of 3/15/2018  Review Complete On: 3/15/2018 By: July Blair MD  
  
 Severity Noted Reaction Type Reactions Vancomycin  03/20/2013   Systemic Vertigo Current Immunizations  Never Reviewed No immunizations on file. Not reviewed this visit You Were Diagnosed With   
  
 Codes Comments Chronic combined systolic and diastolic heart failure (HCC)    -  Primary ICD-10-CM: I50.42 
ICD-9-CM: 428.42 stable Atherosclerosis of native coronary artery of native heart without angina pectoris     ICD-10-CM: I25.10 ICD-9-CM: 414.01 stable 
no angina Essential hypertension, benign     ICD-10-CM: I10 
ICD-9-CM: 401.1 mildly elevated 
monitor Hyperlipidemia, unspecified hyperlipidemia type     ICD-10-CM: E78.5 ICD-9-CM: 272.4 stable Stage 5 chronic kidney disease not on chronic dialysis [facility])     ICD-10-CM: N18.5 ICD-9-CM: 585.5 monitored Vitals BP Pulse Height(growth percentile) Smoking Status 156/62 91 6' (1.829 m) Former Smoker Vitals History Preferred Pharmacy Pharmacy Name Phone [phone] [facility], [address] Your Updated Medication List  
  
   
 This list is accurate as of 3/15/18 11:22 AM.  Always use your most recent med list. amLODIPine 10 mg tablet Commonly known as:  Lynnell Manual Take  by mouth daily. aspirin delayed-release 81 mg tablet Take  by mouth daily. calcitRIOL 0.25 mcg capsule Commonly known as:  ROCALTROL Take 0.25 mcg by mouth daily. calcium acetate 667 mg Cap Commonly known as:  PHOSLO Take  by mouth three (3) times daily (with meals). carvedilol 6.25 mg tablet Commonly known as:  Eyad Astoria Take 12.5 mg by mouth two (2) times daily (with meals). cloNIDine HCl 0.3 mg tablet Commonly known as:  CATAPRES Take 0.2 mg by mouth two (2) times a day. ergocalciferol 50,000 unit capsule Commonly known as:  ERGOCALCIFEROL Take 50,000 Units by mouth. 2 times per week  
  
 fluticasone 50 mcg/actuation nasal spray Commonly known as:  Emmanuelle Golds 2 Sprays by Both Nostrils route daily. glucose 4 gram chewable tablet Take 15 g by mouth as needed. 1-5 tabs as needed HumaLOG U-100 Insulin 100 unit/mL injection Generic drug:  insulin lispro  
by SubCUTAneous route. hydrALAZINE 10 mg tablet Commonly known as:  APRESOLINE Take 10 mg by mouth. 2 1/2 tabs twice a day  
  
 isosorbide mononitrate ER 30 mg tablet Commonly known as:  IMDUR Take  by mouth daily. LANTUS U-100 INSULIN 100 unit/mL injection Generic drug:  insulin glargine  
by SubCUTAneous route once. levobunolol 0.5 % ophthalmic solution Commonly known as:  Juan Antonio Neal Administer 1 Drop to both eyes nightly. LIPITOR 10 mg tablet Generic drug:  atorvastatin Take  by mouth daily. oxyCODONE-acetaminophen  mg per tablet Commonly known as:  PERCOCET 10 Take 1 Tab by mouth. PriLOSEC 20 mg capsule Generic drug:  omeprazole Take 20 mg by mouth daily. sodium bicarbonate 650 mg tablet Take  by mouth two (2) times a day. 2 tabs timolol 0.5 % ophthalmic solution Commonly known as:  TIMOPTIC  
1 Drop two (2) times a day. torsemide 20 mg tablet Commonly known as:  DEMADEX Take  by mouth daily. Follow-up Instructions Return in about 6 months (around 9/15/2018). Introducing [facility]! [facility] introduces [employer] patient portal. Now you can access parts of your medical record, email your doctor's office, and request medication refills online. 1. In your internet browser, go to [email or web address] 2. Click on the First Time User? Click Here link in the Sign In box. You will see the New Member Sign Up page. 3. Enter your [employer] Access Code exactly as it appears below. You will not need to use this code after youve completed the sign-up process. If you do not sign up before the expiration date, you must request a new code. · [employer] Access Code: [email or web address] Expires: 5/21/2018 12:49 PM 
 
4. Enter the last four digits of your Social Security Number (xxxx) and Date of Birth (mm/dd/yyyy) as indicated and click Submit. You will be taken to the next sign-up page. 5. Create a [employer] ID. This will be your [employer] login ID and cannot be changed, so think of one that is secure and easy to remember. 6. Create a [employer] password. You can change your password at any time. 7. Enter your Password Reset Question and Answer. This can be used at a later time if you forget your password. 8. Enter your e-mail address. You will receive e-mail notification when new information is available in [address] 9. Click Sign Up. You can now view and download portions of your medical record. 10. Click the Download Summary menu link to download a portable copy of your medical information. If you have questions, please visit the Frequently Asked Questions section of the [employer] website. Remember, [employer] is NOT to be used for urgent needs. For medical emergencies, dial 911. Now available from your iPhone and Android! Please provide this summary of care documentation to your next provider. Your primary care clinician is listed as [address] [place] [address]. If you have any questions after today's visit, please call [phone].

## 2018-03-15 NOTE — PROGRESS NOTES
HISTORY OF PRESENT ILLNESS  Iliana Bhatia is a 80 y.o. male. HPI Comments: Patient with chf,cmp,htn,ckd. On follow up patient denies any chest pains,sob, palpitation or other significant symptoms. Recent admission with  chf-6/2017  Feels better now  Refusing dialysis    Hypertension   The history is provided by the patient. This is a chronic problem. The problem occurs constantly. The problem has not changed since onset. Pertinent negatives include no chest pain, no abdominal pain, no headaches and no shortness of breath. Cholesterol Problem   Pertinent negatives include no chest pain, no abdominal pain, no headaches and no shortness of breath. Cardiomyopathy   The history is provided by the patient. This is a chronic problem. The problem occurs constantly. The problem has not changed since onset. Pertinent negatives include no chest pain, no abdominal pain, no headaches and no shortness of breath. CHF   The history is provided by the patient. This is a chronic problem. The problem occurs constantly. The problem has not changed since onset. Pertinent negatives include no chest pain, no abdominal pain, no headaches and no shortness of breath. Shortness of Breath   Pertinent negatives include no fever, no headaches, no cough, no sputum production, no hemoptysis, no wheezing, no PND, no orthopnea, no chest pain, no vomiting, no abdominal pain, no rash, no leg swelling and no claudication. Review of Systems   Constitutional: Negative for chills and fever. HENT: Negative for nosebleeds. Eyes: Negative for blurred vision and double vision. Respiratory: Negative for cough, hemoptysis, sputum production, shortness of breath and wheezing. Cardiovascular: Negative for chest pain, palpitations, orthopnea, claudication, leg swelling and PND. Gastrointestinal: Negative for abdominal pain, heartburn, nausea and vomiting. Musculoskeletal: Negative for myalgias. Skin: Negative for rash. Neurological: Negative for dizziness, weakness and headaches. Endo/Heme/Allergies: Does not bruise/bleed easily. Family History   Problem Relation Age of Onset    Diabetes Mother     Heart Disease Mother     Cancer Other        Past Medical History:   Diagnosis Date    CAD (coronary artery disease)     Chronic combined systolic and diastolic heart failure ([facility] [address] 3/17/2015    stable limited activity recent admsission     Chronic kidney disease, unspecified 3/17/2015    not on acei/arb     Coronary atherosclerosis of native coronary artery 3/17/2015    abnormal stress test medically managed no angina     Diabetes ([facility] [address]     Diabetes mellitus     Essential hypertension     Essential hypertension, benign     On Hydralazine, Labetalol, clonidine and norvasc HX of ARF and hyperkalemia with ARB; stable    High cholesterol     Hypertension     Other and unspecified hyperlipidemia     On Zocor and Niaspan    Other primary cardiomyopathies 3/17/2015    ef 45%     Peripheral vascular disease, unspecified     Bilateral AKA    Peripheral vascular disease, unspecified     Bilateral AKA     Reflux     Renal insufficiency        Past Surgical History:   Procedure Laterality Date    HX ORTHOPAEDIC      double amputation,  hip replacement    HX PROSTATECTOMY         Social History   Substance Use Topics    Smoking status: Former Smoker     Packs/day: 2.00     Quit date: 1998    Smokeless tobacco: Never Used    Alcohol use No       Allergies   Allergen Reactions    Vancomycin Vertigo       Outpatient Prescriptions Marked as Taking for the 3/15/18 encounter (Office Visit) with Roger Ascencio MD   Medication Sig Dispense Refill    torsemide (DEMADEX) 20 mg tablet Take  by mouth daily.  omeprazole (PRILOSEC) 20 mg capsule Take 20 mg by mouth daily.  fluticasone (FLONASE) 50 mcg/actuation nasal spray 2 Sprays by Both Nostrils route daily.  1 Bottle 1    sodium bicarbonate 650 mg tablet Take by mouth two (2) times a day. 2 tabs      isosorbide mononitrate ER (IMDUR) 30 mg tablet Take  by mouth daily.  glucose 4 gram chewable tablet Take 15 g by mouth as needed. 1-5 tabs as needed      oxyCODONE-acetaminophen (PERCOCET 10)  mg per tablet Take 1 Tab by mouth.  calcium acetate (PHOSLO) 667 mg cap Take  by mouth three (3) times daily (with meals).  calcitRIOL (ROCALTROL) 0.25 mcg capsule Take 0.25 mcg by mouth daily.  timolol (TIMOPTIC) 0.5 % ophthalmic solution 1 Drop two (2) times a day.  hydrALAZINE (APRESOLINE) 10 mg tablet Take 10 mg by mouth. 2 1/2 tabs twice a day      cloNIDine (CATAPRESS) 0.3 mg tablet Take 0.2 mg by mouth two (2) times a day.  carvedilol (COREG) 6.25 mg tablet Take 12.5 mg by mouth two (2) times daily (with meals).  levobunolol (BETAGAN) 0.5 % ophthalmic solution Administer 1 Drop to both eyes nightly.  insulin glargine (LANTUS) 100 unit/mL injection by SubCUTAneous route once.  atorvastatin (LIPITOR) 10 mg tablet Take  by mouth daily.  aspirin delayed-release 81 mg tablet Take  by mouth daily.  ergocalciferol (ERGOCALCIFEROL) 50,000 unit capsule Take 50,000 Units by mouth. 2 times per week      insulin lispro (HUMALOG) 100 unit/mL injection by SubCUTAneous route.  amLODIPine (NORVASC) 10 mg tablet Take  by mouth daily. Visit Vitals    /62    Pulse 91    Ht 6' (1.829 m)         Physical Exam   Constitutional: He is oriented to person, place, and time. He appears well-developed and well-nourished. HENT:   Head: Normocephalic and atraumatic. Eyes: Conjunctivae are normal.   Neck: Neck supple. No JVD present. No tracheal deviation present. No thyromegaly present. Cardiovascular: Normal rate, regular rhythm and normal heart sounds. Exam reveals no gallop and no friction rub. No murmur heard. Pulmonary/Chest: Breath sounds normal. No respiratory distress. He has no wheezes.  He has no rales. He exhibits no tenderness. Abdominal: Soft. There is no tenderness. Musculoskeletal: He exhibits no edema. bilat amputation   Neurological: He is alert and oriented to person, place, and time. Skin: Skin is warm and dry. Psychiatric: He has a normal mood and affect. Mr. Kavita Flores has a reminder for a \"due or due soon\" health maintenance. I have asked that he contact his primary care provider for follow-up on this health maintenance. CARDIOLOGY STUDIES 10/1/2010 11/1/2008   Myocardial Perfusion Scan Result - abn scan, suggestive Diapharagmatic attenuation with no reversible ischemia, EF 50%   Echocardiogram - Complete Result EF 60%, mild MR -   Some recent data might be hidden     01/09/15 1343 ECHOCARDIOGRAM COMPLETE   REDUCED GLOBAL LEFT VENTRICULAR SYSTOLIC FUNCTION. GLOBAL HYPOKINESIS WITH A VISUALLY  ESTIMATED EJECTION FRACTION OF 45%. MILD DIASTOLIC DYSFUNCTION. MILD CONCENTRIC LEFT VENTRICULAR HYPERTROPHY. MILDLY DILATED LEFT ATRIUM. NO HEMODYNAMICALLY SIGNIFICANT VALVULAR PATHOLOGY. NORMAL PULMONARY ARTERY PRESSURE OF 17 MMHG. CIRCUMFERENTIAL PERICARDIAL EFFUSION WITH NO HEMODYNAMIC COMPROMISE. COMPARED TO PREVIOUS REPORT ON 10/08/2010; EJECTION FRACTION HAS REDUCED FROM 60% TO 45%  Stress test:1/2015  PHARMACOLOGIC NUCLEAR STRESS TEST IS ABNORMAL. REST/STRESS SPECT IMAGES INDICATE PERFUSION DEFECTS ARE PRESENT. A SMALL REVERSIBLE DEFECT IS NOTED IN THE BASAL INFERIOR AREA OF MILD TO MODERATE INTENSITY. A  SMALL REVERSIBLE DEFECT IS NOTED IN THE MID INFERIOR AREA OF MILD TO MODERATE INTENSITY. A SMALL  REVERSIBLE DEFECT IS NOTED IN THE APICAL INFERIOR AREA OF MILD TO MODERATE INTENSITY. LEFT  VENTRICLE APPEARS NORMAL ON BOTH STRESS AND REST. RIGHT VENTRICLE APPEARS NORMAL ON BOTH STRESS AND REST.   GLOBAL HYPOKINESIS WITH EF 33%  I have personally reviewed patient's records available from hospital and other providers and incorporated findings in patient care.  obici-7/2015  I Have personally reviewed recent relevant labs available and discussed with patient  Vania,cbc-7/2015  Impression   :echo-2/2017  NORMAL LEFT VENTRICULAR CAVITY SIZE AND SYSTOLIC FUNCTION WITH AN EJECTION FRACTION OF 55 %. MODERATE LEFT VENTRICULAR HYPERTROPHY PRESENT. MILD DIASTOLIC DYSFUNCTION. MODERATELY DILATED LEFT ATRIUM. MITRAL ANNULAR CALCIFICATION WITH MILD MITRAL REGURGITATION. SCLEROTIC TRILEAFLET AORTIC VALVE WITHOUT EVIDENCE OF STENOSIS. TRACE OF TRICUSPID REGURGITATION WITH A NORMAL PULMONARY ARTERY PRESSURE  STRUCTURALLY NORMAL PULMONIC VALVE WITH MILD PULMONIC REGURGITATION. TRIVIAL ANTERIOR AND SMALL POSTERIOR PERICARDIAL EFFUSION. NO MASSES, SHUNTS OR THROMBI SEEN. I have personally reviewed patient's records available from hospital and other providers and incorporated findings in patient care. 6/2017-notes,lab  Assessment         ICD-10-CM ICD-9-CM    1. Chronic combined systolic and diastolic heart failure (HCC) I50.42 428.42     stable   2. Atherosclerosis of native coronary artery of native heart without angina pectoris I25.10 414.01     stable  no angina   3. Essential hypertension, benign I10 401.1     mildly elevated  monitor   4. Hyperlipidemia, unspecified hyperlipidemia type E78.5 272.4     stable   5. Stage 5 chronic kidney disease not on chronic dialysis (HCC) N18.5 585.5     monitored       There are no discontinued medications. No orders of the defined types were placed in this encounter. Follow-up Disposition:  Return in about 6 months (around 9/15/2018).

## 2018-03-15 NOTE — PROGRESS NOTES
1. Have you been to the ER, urgent care clinic since your last visit? Hospitalized since your last visit? Yes atul    2. Have you seen or consulted any other health care providers outside of the [address] [place] [address] since your last visit? Include any pap smears or colon screening. No     3. Since your last visit, have you had any of the following symptoms?    no

## 2018-03-15 NOTE — LETTER
Sherren Mar 1935 3/15/2018 Dear Lidia Bernstein MD 
 
I had the pleasure of evaluating  Mr. Kate Tipton in office today. Below are the relevant portions of my assessment and plan of care. ICD-10-CM ICD-9-CM 1. Chronic combined systolic and diastolic heart failure (HCC) I50.42 428.42   
 stable 2. Atherosclerosis of native coronary artery of native heart without angina pectoris I25.10 414.01   
 stable 
no angina 3. Essential hypertension, benign I10 401.1   
 mildly elevated 
monitor 4. Hyperlipidemia, unspecified hyperlipidemia type E78.5 272.4   
 stable 5. Stage 5 chronic kidney disease not on chronic dialysis (HCC) N18.5 585.5   
 monitored Current Outpatient Prescriptions Medication Sig Dispense Refill  torsemide (DEMADEX) 20 mg tablet Take  by mouth daily.  omeprazole (PRILOSEC) 20 mg capsule Take 20 mg by mouth daily.  fluticasone (FLONASE) 50 mcg/actuation nasal spray 2 Sprays by Both Nostrils route daily. 1 Bottle 1  
 sodium bicarbonate 650 mg tablet Take  by mouth two (2) times a day. 2 tabs  isosorbide mononitrate ER (IMDUR) 30 mg tablet Take  by mouth daily.  glucose 4 gram chewable tablet Take 15 g by mouth as needed. 1-5 tabs as needed  oxyCODONE-acetaminophen (PERCOCET 10)  mg per tablet Take 1 Tab by mouth.  calcium acetate (PHOSLO) 667 mg cap Take  by mouth three (3) times daily (with meals).  calcitRIOL (ROCALTROL) 0.25 mcg capsule Take 0.25 mcg by mouth daily.  timolol (TIMOPTIC) 0.5 % ophthalmic solution 1 Drop two (2) times a day.  hydrALAZINE (APRESOLINE) 10 mg tablet Take 10 mg by mouth. 2 1/2 tabs twice a day  cloNIDine (CATAPRESS) 0.3 mg tablet Take 0.2 mg by mouth two (2) times a day.  carvedilol (COREG) 6.25 mg tablet Take 12.5 mg by mouth two (2) times daily (with meals).  levobunolol (BETAGAN) 0.5 % ophthalmic solution Administer 1 Drop to both eyes nightly.  insulin glargine (LANTUS) 100 unit/mL injection by SubCUTAneous route once.  atorvastatin (LIPITOR) 10 mg tablet Take  by mouth daily.  aspirin delayed-release 81 mg tablet Take  by mouth daily.  ergocalciferol (ERGOCALCIFEROL) 50,000 unit capsule Take 50,000 Units by mouth. 2 times per week  insulin lispro (HUMALOG) 100 unit/mL injection by SubCUTAneous route.  amLODIPine (NORVASC) 10 mg tablet Take  by mouth daily. No orders of the defined types were placed in this encounter. If you have questions, please do not hesitate to call me. I look forward to following  Cyril Thorpe along with you. Sincerely, Sterling Thompson MD

## 2018-07-10 ENCOUNTER — OFFICE VISIT (OUTPATIENT)
Dept: ORTHOPEDIC SURGERY | Age: 83
End: 2018-07-10

## 2018-07-10 VITALS
DIASTOLIC BLOOD PRESSURE: 57 MMHG | BODY MASS INDEX: 21.67 KG/M2 | HEIGHT: 72 IN | HEART RATE: 87 BPM | OXYGEN SATURATION: 98 % | SYSTOLIC BLOOD PRESSURE: 153 MMHG | WEIGHT: 160 LBS

## 2018-07-10 DIAGNOSIS — M18.0 ARTHRITIS OF CARPOMETACARPAL (CMC) JOINTS OF BOTH THUMBS: Primary | ICD-10-CM

## 2018-07-10 RX ORDER — METHYLPREDNISOLONE ACETATE 40 MG/ML
40 INJECTION, SUSPENSION INTRA-ARTICULAR; INTRALESIONAL; INTRAMUSCULAR; SOFT TISSUE ONCE
Qty: 1 VIAL | Refills: 0
Start: 2018-07-10 | End: 2018-07-10

## 2018-07-10 NOTE — PROGRESS NOTES
HISTORY OF PRESENT ILLNESS:  Melchor Krabbe is here for follow-up for some pain in his left hand. I last him 02/20/2018. At that time, he had known osteoarthritis of both carpometacarpal joints, right and left. He is right-hand dominant. He states his left hand did very well following a cortisone injection back in 02/2018. He now has some recurrence of symptoms. He has been deemed a nonsurgical candidate. In addition, I injected his right carpal tunnel at that time which he has done well with that injection. 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 With reference to his hand, he has mild soft tissue swelling of the hand dorsally. He has palpable tenderness over the left thumb 1st carpometacarpal joint. Compression test is positive across this joint. He does not have hyperextension of the left thumb at the IP joint. IMPRESSION:  Osteoarthritis, left thumb carpometacarpal joint    RECOMMENDATIONS:  Under sterile technique, I injected the left 1st carpometacarpal joint with 40 mg of Depo-Medrol. He tolerated the procedure well. He will let me know if he has significant recurrent symptoms, although he does have known significant arthritis and this will be an ongoing problem for him. Again, he has been deemed a nonsurgical candidate per the family. All of his questions were answered today.                  Vitals:    07/10/18 1313   BP: 153/57   Pulse: 87   SpO2: 98%   Weight: 160 lb (72.6 kg)   Height: 6' (1.829 m)   PainSc:   4   PainLoc: Hand       Patient Active Problem List   Diagnosis Code    Essential hypertension, benign I10    Hyperlipidemia E78.5    Peripheral vascular disease (HCC) I73.9    Chronic combined systolic and diastolic heart failure (HCC) I50.42    Coronary atherosclerosis of native coronary artery I25.10    Cardiomyopathy ([facility] I42.9    Chronic kidney disease, unspecified N18.9    Renal failure N19    Anemia D64.9     Patient Active Problem List    Diagnosis Date Noted    Renal failure 12/18/2017    Anemia 12/18/2017    Chronic combined systolic and diastolic heart failure ([facility] 03/17/2015    Coronary atherosclerosis of native coronary artery 03/17/2015    Cardiomyopathy ([facility] 03/17/2015    Chronic kidney disease, unspecified 03/17/2015    Essential hypertension, benign     Hyperlipidemia     Peripheral vascular disease ([facility]      Current Outpatient Prescriptions   Medication Sig Dispense Refill    torsemide (DEMADEX) 20 mg tablet Take  by mouth daily.  fluticasone (FLONASE) 50 mcg/actuation nasal spray 2 Sprays by Both Nostrils route daily. 1 Bottle 1    sodium bicarbonate 650 mg tablet Take  by mouth two (2) times a day. 2 tabs      isosorbide mononitrate ER (IMDUR) 30 mg tablet Take  by mouth daily.  glucose 4 gram chewable tablet Take 15 g by mouth as needed. 1-5 tabs as needed      oxyCODONE-acetaminophen (PERCOCET 10)  mg per tablet Take 1 Tab by mouth.  calcium acetate (PHOSLO) 667 mg cap Take  by mouth three (3) times daily (with meals).  calcitRIOL (ROCALTROL) 0.25 mcg capsule Take 0.25 mcg by mouth daily.  timolol (TIMOPTIC) 0.5 % ophthalmic solution 1 Drop two (2) times a day.  hydrALAZINE (APRESOLINE) 10 mg tablet Take 10 mg by mouth. 2 1/2 tabs twice a day      cloNIDine (CATAPRESS) 0.3 mg tablet Take 0.2 mg by mouth two (2) times a day.  carvedilol (COREG) 6.25 mg tablet Take 12.5 mg by mouth two (2) times daily (with meals).  levobunolol (BETAGAN) 0.5 % ophthalmic solution Administer 1 Drop to both eyes nightly.  insulin glargine (LANTUS) 100 unit/mL injection by SubCUTAneous route once.  atorvastatin (LIPITOR) 10 mg tablet Take  by mouth daily.  aspirin delayed-release 81 mg tablet Take  by mouth daily.  ergocalciferol (ERGOCALCIFEROL) 50,000 unit capsule Take 50,000 Units by mouth.  2 times per week      insulin lispro (HUMALOG) 100 unit/mL injection by SubCUTAneous route.  amLODIPine (NORVASC) 10 mg tablet Take  by mouth daily.  omeprazole (PRILOSEC) 20 mg capsule Take 20 mg by mouth daily.        Allergies   Allergen Reactions    Vancomycin Vertigo     Past Medical History:   Diagnosis Date    CAD (coronary artery disease)     Chronic combined systolic and diastolic heart failure ([facility] [address] 3/17/2015    stable limited activity recent admsission     Chronic kidney disease, unspecified 3/17/2015    not on acei/arb     Coronary atherosclerosis of native coronary artery 3/17/2015    abnormal stress test medically managed no angina     Diabetes ([facility] [address]     Diabetes mellitus     Essential hypertension     Essential hypertension, benign     On Hydralazine, Labetalol, clonidine and norvasc HX of ARF and hyperkalemia with ARB; stable    High cholesterol     Hypertension     Other and unspecified hyperlipidemia     On Zocor and Niaspan    Other primary cardiomyopathies 3/17/2015    ef 45%     Peripheral vascular disease, unspecified (HCC)     Bilateral AKA    Peripheral vascular disease, unspecified ([facility] [address]     Bilateral AKA     Reflux     Renal insufficiency      Past Surgical History:   Procedure Laterality Date    HX ORTHOPAEDIC      double amputation,  hip replacement    HX PROSTATECTOMY       Family History   Problem Relation Age of Onset    Diabetes Mother     Heart Disease Mother     Cancer Other      Social History   Substance Use Topics    Smoking status: Former Smoker     Packs/day: 2.00     Quit date: 2/27/1998    Smokeless tobacco: Never Used    Alcohol use No

## 2018-09-10 ENCOUNTER — OFFICE VISIT (OUTPATIENT)
Dept: CARDIOLOGY CLINIC | Age: 83
End: 2018-09-10

## 2018-09-10 VITALS — HEIGHT: 72 IN | HEART RATE: 88 BPM | SYSTOLIC BLOOD PRESSURE: 162 MMHG | DIASTOLIC BLOOD PRESSURE: 60 MMHG

## 2018-09-10 DIAGNOSIS — N18.5 STAGE 5 CHRONIC KIDNEY DISEASE NOT ON CHRONIC DIALYSIS (HCC): ICD-10-CM

## 2018-09-10 DIAGNOSIS — I42.9 CARDIOMYOPATHY, UNSPECIFIED TYPE (HCC): ICD-10-CM

## 2018-09-10 DIAGNOSIS — I50.42 CHRONIC COMBINED SYSTOLIC AND DIASTOLIC HEART FAILURE (HCC): ICD-10-CM

## 2018-09-10 DIAGNOSIS — I25.10 ATHEROSCLEROSIS OF NATIVE CORONARY ARTERY OF NATIVE HEART WITHOUT ANGINA PECTORIS: Primary | ICD-10-CM

## 2018-09-10 DIAGNOSIS — I10 ESSENTIAL HYPERTENSION, BENIGN: ICD-10-CM

## 2018-09-10 DIAGNOSIS — E78.5 HYPERLIPIDEMIA, UNSPECIFIED HYPERLIPIDEMIA TYPE: ICD-10-CM

## 2018-09-10 NOTE — LETTER
[facility] 1935 
 
9/10/2018 Dear Nico Segura MD 
 
I had the pleasure of evaluating  Mr. Lola Negron in office today. Below are the relevant portions of my assessment and plan of care. ICD-10-CM ICD-9-CM 1. Atherosclerosis of native coronary artery of native heart without angina pectoris I25.10 414.01 Stable. Continue medical management abnormal nuclear scan in past  
2. Chronic combined systolic and diastolic heart failure (HCC) I50.42 428.42 Stable. Limited activity. Patient had normal ejection fraction on last echo which is improved 3. Cardiomyopathy, unspecified type ([address] I42.9 425.4 Improving ejection fraction 4. Essential hypertension, benign I10 401.1 Continue treatment. Stable 5. Hyperlipidemia, unspecified hyperlipidemia type E78.5 272.4 Lab with PCP-last 9/2017-ldl controlled 6. Stage 5 chronic kidney disease not on chronic dialysis (HCC) N18.5 585.5 Current Outpatient Prescriptions Medication Sig Dispense Refill  torsemide (DEMADEX) 20 mg tablet Take  by mouth daily.  fluticasone (FLONASE) 50 mcg/actuation nasal spray 2 Sprays by Both Nostrils route daily. 1 Bottle 1  
 sodium bicarbonate 650 mg tablet Take  by mouth two (2) times a day. 2 tabs  glucose 4 gram chewable tablet Take 15 g by mouth as needed. 1-5 tabs as needed  oxyCODONE-acetaminophen (PERCOCET 10)  mg per tablet Take 1 Tab by mouth.  calcium acetate (PHOSLO) 667 mg cap Take  by mouth three (3) times daily (with meals).  calcitRIOL (ROCALTROL) 0.25 mcg capsule Take 0.25 mcg by mouth daily.  timolol (TIMOPTIC) 0.5 % ophthalmic solution 1 Drop two (2) times a day.  hydrALAZINE (APRESOLINE) 10 mg tablet Take 10 mg by mouth three (3) times daily.  carvedilol (COREG) 6.25 mg tablet Take 12.5 mg by mouth two (2) times daily (with meals).     
 levobunolol (BETAGAN) 0.5 % ophthalmic solution Administer 1 Drop to both eyes nightly.  insulin glargine (LANTUS) 100 unit/mL injection by SubCUTAneous route once.  atorvastatin (LIPITOR) 10 mg tablet Take  by mouth daily.  aspirin delayed-release 81 mg tablet Take  by mouth daily.  ergocalciferol (ERGOCALCIFEROL) 50,000 unit capsule Take 50,000 Units by mouth. 2 times per week  insulin lispro (HUMALOG) 100 unit/mL injection by SubCUTAneous route.  amLODIPine (NORVASC) 10 mg tablet Take  by mouth daily. No orders of the defined types were placed in this encounter. If you have questions, please do not hesitate to call me. I look forward to following Mr. Stephanie Velazquez along with you. Sincerely, Nadia Atkinson MD

## 2018-09-10 NOTE — PROGRESS NOTES
1. Have you been to the ER, urgent care clinic since your last visit? Hospitalized since your last visit?     no2. Have you seen or consulted any other health care providers outside of the [address] [place] [address] since your last visit? Include any pap smears or colon screening. Yes Where: pcp     3. Since your last visit, have you had any of the following symptoms?      swelling in legs/arms.

## 2018-09-10 NOTE — PROGRESS NOTES
HISTORY OF PRESENT ILLNESS  Manfred Bhatia is a 80 y.o. male. HPI Comments: Patient with chf,cmp,htn,ckd. On follow up patient denies any chest pains,sob, palpitation or other significant symptoms. Recent admission with  chf-6/2017  Feels better now  Refusing dialysis    Hypertension   The history is provided by the patient. This is a chronic problem. The problem occurs constantly. The problem has not changed since onset. Pertinent negatives include no chest pain, no abdominal pain, no headaches and no shortness of breath. Cholesterol Problem   Pertinent negatives include no chest pain, no abdominal pain, no headaches and no shortness of breath. Cardiomyopathy   The history is provided by the patient. This is a chronic problem. The problem occurs constantly. The problem has not changed since onset. Pertinent negatives include no chest pain, no abdominal pain, no headaches and no shortness of breath. CHF   The history is provided by the patient. This is a chronic problem. The problem occurs constantly. The problem has not changed since onset. Pertinent negatives include no chest pain, no abdominal pain, no headaches and no shortness of breath. Shortness of Breath   Pertinent negatives include no fever, no headaches, no cough, no sputum production, no hemoptysis, no wheezing, no PND, no orthopnea, no chest pain, no vomiting, no abdominal pain, no rash, no leg swelling and no claudication. Review of Systems   Constitutional: Negative for chills and fever. HENT: Negative for nosebleeds. Eyes: Negative for blurred vision and double vision. Respiratory: Negative for cough, hemoptysis, sputum production, shortness of breath and wheezing. Cardiovascular: Negative for chest pain, palpitations, orthopnea, claudication, leg swelling and PND. Gastrointestinal: Negative for abdominal pain, heartburn, nausea and vomiting. Musculoskeletal: Negative for myalgias. Skin: Negative for rash. Neurological: Negative for dizziness, weakness and headaches. Endo/Heme/Allergies: Does not bruise/bleed easily. Family History   Problem Relation Age of Onset    Diabetes Mother     Heart Disease Mother     Cancer Other        Past Medical History:   Diagnosis Date    CAD (coronary artery disease)     Chronic combined systolic and diastolic heart failure ([facility] 3/17/2015    stable limited activity recent admsission     Chronic kidney disease, unspecified 3/17/2015    not on acei/arb     Coronary atherosclerosis of native coronary artery 3/17/2015    abnormal stress test medically managed no angina     Diabetes ([facility]     Diabetes mellitus     Essential hypertension     Essential hypertension, benign     On Hydralazine, Labetalol, clonidine and norvasc HX of ARF and hyperkalemia with ARB; stable    High cholesterol     Hypertension     Other and unspecified hyperlipidemia     On Zocor and Niaspan    Other primary cardiomyopathies 3/17/2015    ef 45%     Peripheral vascular disease, unspecified (HCC)     Bilateral AKA    Peripheral vascular disease, unspecified ([facility]     Bilateral AKA     Reflux     Renal insufficiency     Stage 5 chronic kidney disease not on chronic dialysis ([facility] 9/10/2018       Past Surgical History:   Procedure Laterality Date    HX ORTHOPAEDIC      double amputation,  hip replacement    HX PROSTATECTOMY         Social History   Substance Use Topics    Smoking status: Former Smoker     Packs/day: 2.00     Quit date: 2/27/1998    Smokeless tobacco: Never Used    Alcohol use No       Allergies   Allergen Reactions    Vancomycin Vertigo           Visit Vitals    /60    Pulse 88    Ht 6' (1.829 m)         Physical Exam   Constitutional: He is oriented to person, place, and time. He appears well-developed and well-nourished. HENT:   Head: Normocephalic and atraumatic. Eyes: Conjunctivae are normal.   Neck: Neck supple. No JVD present.  No tracheal deviation present. No thyromegaly present. Cardiovascular: Normal rate and regular rhythm. Exam reveals gallop and S4. Exam reveals no friction rub. No murmur heard. Pulmonary/Chest: Breath sounds normal. No respiratory distress. He has no wheezes. He has no rales. He exhibits no tenderness. Abdominal: Soft. There is no tenderness. Musculoskeletal: He exhibits no edema. bilat amputation   Neurological: He is alert and oriented to person, place, and time. Skin: Skin is warm and dry. Psychiatric: He has a normal mood and affect. Mr. Red Lofton has a reminder for a \"due or due soon\" health maintenance. I have asked that he contact his primary care provider for follow-up on this health maintenance. CARDIOLOGY STUDIES 10/1/2010 11/1/2008   Myocardial Perfusion Scan Result - abn scan, suggestive Diapharagmatic attenuation with no reversible ischemia, EF 50%   Echocardiogram - Complete Result EF 60%, mild MR -   Some recent data might be hidden     01/09/15 1343 ECHOCARDIOGRAM COMPLETE   REDUCED GLOBAL LEFT VENTRICULAR SYSTOLIC FUNCTION. GLOBAL HYPOKINESIS WITH A VISUALLY  ESTIMATED EJECTION FRACTION OF 45%. MILD DIASTOLIC DYSFUNCTION. MILD CONCENTRIC LEFT VENTRICULAR HYPERTROPHY. MILDLY DILATED LEFT ATRIUM. NO HEMODYNAMICALLY SIGNIFICANT VALVULAR PATHOLOGY. NORMAL PULMONARY ARTERY PRESSURE OF 17 MMHG. CIRCUMFERENTIAL PERICARDIAL EFFUSION WITH NO HEMODYNAMIC COMPROMISE. COMPARED TO PREVIOUS REPORT ON 10/08/2010; EJECTION FRACTION HAS REDUCED FROM 60% TO 45%  Stress test:1/2015  PHARMACOLOGIC NUCLEAR STRESS TEST IS ABNORMAL. REST/STRESS SPECT IMAGES INDICATE PERFUSION DEFECTS ARE PRESENT. A SMALL REVERSIBLE DEFECT IS NOTED IN THE BASAL INFERIOR AREA OF MILD TO MODERATE INTENSITY. A  SMALL REVERSIBLE DEFECT IS NOTED IN THE MID INFERIOR AREA OF MILD TO MODERATE INTENSITY. A SMALL  REVERSIBLE DEFECT IS NOTED IN THE APICAL INFERIOR AREA OF MILD TO MODERATE INTENSITY.  LEFT  VENTRICLE APPEARS NORMAL ON BOTH STRESS AND REST. RIGHT VENTRICLE APPEARS NORMAL ON BOTH STRESS AND REST. GLOBAL HYPOKINESIS WITH EF 33%  I have personally reviewed patient's records available from hospital and other providers and incorporated findings in patient care. obici-7/2015  I Have personally reviewed recent relevant labs available and discussed with patient  Vania,cbc-7/2015  Impression   :echo-2/2017  NORMAL LEFT VENTRICULAR CAVITY SIZE AND SYSTOLIC FUNCTION WITH AN EJECTION FRACTION OF 55 %. MODERATE LEFT VENTRICULAR HYPERTROPHY PRESENT. MILD DIASTOLIC DYSFUNCTION. MODERATELY DILATED LEFT ATRIUM. MITRAL ANNULAR CALCIFICATION WITH MILD MITRAL REGURGITATION. SCLEROTIC TRILEAFLET AORTIC VALVE WITHOUT EVIDENCE OF STENOSIS. TRACE OF TRICUSPID REGURGITATION WITH A NORMAL PULMONARY ARTERY PRESSURE  STRUCTURALLY NORMAL PULMONIC VALVE WITH MILD PULMONIC REGURGITATION. TRIVIAL ANTERIOR AND SMALL POSTERIOR PERICARDIAL EFFUSION. NO MASSES, SHUNTS OR THROMBI SEEN. I have personally reviewed patient's records available from hospital and other providers and incorporated findings in patient care. 6/2017-notes,lab  5/2018-lab-  Assessment         ICD-10-CM ICD-9-CM    1. Atherosclerosis of native coronary artery of native heart without angina pectoris I25.10 414.01     Stable. Continue medical management abnormal nuclear scan in past   2. Chronic combined systolic and diastolic heart failure (HCC) I50.42 428.42     Stable. Limited activity. Patient had normal ejection fraction on last echo which is improved   3. Cardiomyopathy, unspecified type ([address] I42.9 425.4     Improving ejection fraction   4. Essential hypertension, benign I10 401.1     Continue treatment. Stable   5. Hyperlipidemia, unspecified hyperlipidemia type E78.5 272.4     Lab with PCP-last 9/2017-ldl controlled   6.  Stage 5 chronic kidney disease not on chronic dialysis (HCC) N18.5 585.5        Medications Discontinued During This Encounter   Medication Reason    cloNIDine (CATAPRESS) 0.3 mg tablet Not A Current Medication    isosorbide mononitrate ER (IMDUR) 30 mg tablet Not A Current Medication    omeprazole (PRILOSEC) 20 mg capsule Not A Current Medication       No orders of the defined types were placed in this encounter. Follow-up Disposition:  Return in about 6 months (around 3/10/2019).

## 2018-09-10 NOTE — MR AVS SNAPSHOT
[facility] 
 
 
 [address] [facility] 
[phone] Patient: Obed Romero MRN: [id number] QOS:[id number] Visit Information Date & Time Provider Department Dept. Phone Encounter #  
 9/10/2018 11:15 AM Brii Lou MD [facility] [phone] Follow-up Instructions Return in about 6 months (around 3/10/2019). Your Appointments 3/28/2019 11:15 AM  
ESTABLISHED PATIENT with Brii Lou MD  
[facility] ([facility]) Appt Note: 6 month follow up  
 [address] [facility] [address]  
  
   
 [address] [place] [id number] Upcoming Health Maintenance Date Due DTaP/Tdap/Td series (1 - Tdap) 10/15/1956 ZOSTER VACCINE AGE 60> 8/15/1995 GLAUCOMA SCREENING Q2Y 10/15/2000 Pneumococcal 65+ High/Highest Risk (1 of 2 - PCV13) 10/15/2000 MEDICARE YEARLY EXAM 3/14/2018 Influenza Age 5 to Adult 8/1/2018 Allergies as of 9/10/2018  Review Complete On: 9/10/2018 By: Brii Lou MD  
  
 Severity Noted Reaction Type Reactions Vancomycin  03/20/2013   Systemic Vertigo Current Immunizations  Never Reviewed No immunizations on file. Not reviewed this visit You Were Diagnosed With   
  
 Codes Comments Atherosclerosis of native coronary artery of native heart without angina pectoris    -  Primary ICD-10-CM: I25.10 ICD-9-CM: 414.01 Stable. Continue medical management abnormal nuclear scan in past  
 Chronic combined systolic and diastolic heart failure (HCC)     ICD-10-CM: I50.42 
ICD-9-CM: 428.42 Stable. Limited activity. Patient had normal ejection fraction on last echo which is improved Cardiomyopathy, unspecified type ([facility]     ICD-10-CM: I42.9 ICD-9-CM: 425.4 Improving ejection fraction Essential hypertension, benign     ICD-10-CM: I10 
ICD-9-CM: 401.1 Continue treatment. Stable Hyperlipidemia, unspecified hyperlipidemia type     ICD-10-CM: E78.5 ICD-9-CM: 272.4 Lab with PCP-last 9/2017-ldl controlled Stage 5 chronic kidney disease not on chronic dialysis [facility])     ICD-10-CM: N18.5 ICD-9-CM: 481. 5 Vitals BP Pulse Height(growth percentile) Smoking Status 162/60 88 6' (1.829 m) Former Smoker Vitals History Preferred Pharmacy Pharmacy Name Phone [facility], [address] [place] [address] Your Updated Medication List  
  
   
This list is accurate as of 9/10/18 11:24 AM.  Always use your most recent med list. amLODIPine 10 mg tablet Commonly known as:  Sheron Pace Take  by mouth daily. aspirin delayed-release 81 mg tablet Take  by mouth daily. calcitRIOL 0.25 mcg capsule Commonly known as:  ROCALTROL Take 0.25 mcg by mouth daily. calcium acetate 667 mg Cap Commonly known as:  PHOSLO Take  by mouth three (3) times daily (with meals). carvedilol 6.25 mg tablet Commonly known as:  Phineas Hoar Take 12.5 mg by mouth two (2) times daily (with meals). ergocalciferol 50,000 unit capsule Commonly known as:  ERGOCALCIFEROL Take 50,000 Units by mouth. 2 times per week  
  
 fluticasone 50 mcg/actuation nasal spray Commonly known as:  Jose Metro 2 Sprays by Both Nostrils route daily. glucose 4 gram chewable tablet Take 15 g by mouth as needed. 1-5 tabs as needed HumaLOG U-100 Insulin 100 unit/mL injection Generic drug:  insulin lispro  
by SubCUTAneous route. hydrALAZINE 10 mg tablet Commonly known as:  APRESOLINE Take 10 mg by mouth three (3) times daily. LANTUS U-100 INSULIN 100 unit/mL injection Generic drug:  insulin glargine  
by SubCUTAneous route once. levobunolol 0.5 % ophthalmic solution Commonly known as:  Fatoumata Bonilla Administer 1 Drop to both eyes nightly. LIPITOR 10 mg tablet Generic drug:  atorvastatin Take  by mouth daily. oxyCODONE-acetaminophen  mg per tablet Commonly known as:  PERCOCET 10 Take 1 Tab by mouth.  
  
 sodium bicarbonate 650 mg tablet Take  by mouth two (2) times a day. 2 tabs  
  
 timolol 0.5 % ophthalmic solution Commonly known as:  TIMOPTIC  
1 Drop two (2) times a day. torsemide 20 mg tablet Commonly known as:  DEMADEX Take  by mouth daily. Follow-up Instructions Return in about 6 months (around 3/10/2019). Introducing \[facility] [place] [facility]\"" & HEALTH SERVICES! Obed Patrick introduces [employer] patient portal. Now you can access parts of your medical record, email your doctor's office, and request medication refills online. 1. In your internet browser, go to [email or web address] 2. Click on the First Time User? Click Here link in the Sign In box. You will see the New Member Sign Up page. 3. Enter your [employer] Access Code exactly as it appears below. You will not need to use this code after youve completed the sign-up process. If you do not sign up before the expiration date, you must request a new code. · [employer] Access Code: [email or web address] Expires: 10/8/2018 11:30 AM 
 
4. Enter the last four digits of your Social Security Number (xxxx) and Date of Birth (mm/dd/yyyy) as indicated and click Submit. You will be taken to the next sign-up page. 5. Create a [employer]ots ID. This will be your [employer] login ID and cannot be changed, so think of one that is secure and easy to remember. 6. Create a [employer] password. You can change your password at any time. 7. Enter your Password Reset Question and Answer. This can be used at a later time if you forget your password. 8. Enter your e-mail address. You will receive e-mail notification when new information is available in [address] 9. Click Sign Up. You can now view and download portions of your medical record.  
10. Click the Download Summary menu link to download a portable copy of your medical information. If you have questions, please visit the Frequently Asked Questions section of the [employer] website. Remember, [employer] is NOT to be used for urgent needs. For medical emergencies, dial 911. Now available from your iPhone and Android! Please provide this summary of care documentation to your next provider. Your primary care clinician is listed as [address] [place] [address]. If you have any questions after today's visit, please call [phone].

## 2019-03-19 ENCOUNTER — OFFICE VISIT (OUTPATIENT)
Dept: CARDIOLOGY CLINIC | Age: 84
End: 2019-03-19

## 2019-03-19 VITALS
SYSTOLIC BLOOD PRESSURE: 140 MMHG | HEIGHT: 72 IN | DIASTOLIC BLOOD PRESSURE: 46 MMHG | HEART RATE: 78 BPM | BODY MASS INDEX: 21.7 KG/M2

## 2019-03-19 DIAGNOSIS — I50.32 DIASTOLIC CHF, CHRONIC (HCC): ICD-10-CM

## 2019-03-19 DIAGNOSIS — N18.5 STAGE 5 CHRONIC KIDNEY DISEASE NOT ON CHRONIC DIALYSIS (HCC): ICD-10-CM

## 2019-03-19 DIAGNOSIS — I10 ESSENTIAL HYPERTENSION, BENIGN: ICD-10-CM

## 2019-03-19 DIAGNOSIS — E78.5 HYPERLIPIDEMIA, UNSPECIFIED HYPERLIPIDEMIA TYPE: ICD-10-CM

## 2019-03-19 DIAGNOSIS — C18.9 MALIGNANT NEOPLASM OF COLON, UNSPECIFIED PART OF COLON (HCC): ICD-10-CM

## 2019-03-19 DIAGNOSIS — I25.10 CORONARY ARTERY DISEASE INVOLVING NATIVE CORONARY ARTERY OF NATIVE HEART WITHOUT ANGINA PECTORIS: ICD-10-CM

## 2019-03-19 DIAGNOSIS — I25.10 ATHEROSCLEROSIS OF NATIVE CORONARY ARTERY OF NATIVE HEART WITHOUT ANGINA PECTORIS: Primary | ICD-10-CM

## 2019-03-19 NOTE — PROGRESS NOTES
1. Have you been to the ER, urgent care clinic since your last visit? Hospitalized since your last visit? Yes atul    2. Have you seen or consulted any other health care providers outside of the [address] [place] [address] since your last visit? Include any pap smears or colon screening. Yes Where: pcp     3. Since your last visit, have you had any of the following symptoms?  no

## 2019-03-19 NOTE — LETTER
[facility] 1935 
 
3/19/2019 Dear Edward Hernandez MD 
 
I had the pleasure of evaluating  Mr. Rose Hawkins in office today. Below are the relevant portions of my assessment and plan of care. ICD-10-CM ICD-9-CM 1. Atherosclerosis of native coronary artery of native heart without angina pectoris I25.10 414.01   
2. Essential hypertension, benign I10 401.1 3. Hyperlipidemia, unspecified hyperlipidemia type E78.5 272.4 4. Diastolic CHF, chronic (HCC) I50.32 428.32   
  428.0 5. Stage 5 chronic kidney disease not on chronic dialysis (HCC) N18.5 585.5 6. Coronary artery disease involving native coronary artery of native heart without angina pectoris I25.10 414.01 Abnormal NST - unable to have cardiac cath due to CKD 7. Malignant neoplasm of colon, unspecified part of colon ([facility] C18.9 153.9 Considering palliative chemo Current Outpatient Medications Medication Sig Dispense Refill  torsemide (DEMADEX) 20 mg tablet Take  by mouth daily.  fluticasone (FLONASE) 50 mcg/actuation nasal spray 2 Sprays by Both Nostrils route daily. 1 Bottle 1  
 sodium bicarbonate 650 mg tablet Take  by mouth two (2) times a day. 2 tabs  glucose 4 gram chewable tablet Take 15 g by mouth as needed. 1-5 tabs as needed  oxyCODONE-acetaminophen (PERCOCET 10)  mg per tablet Take 1 Tab by mouth.  calcium acetate (PHOSLO) 667 mg cap Take  by mouth three (3) times daily (with meals).  calcitRIOL (ROCALTROL) 0.25 mcg capsule Take 0.25 mcg by mouth daily.  timolol (TIMOPTIC) 0.5 % ophthalmic solution 1 Drop two (2) times a day.  hydrALAZINE (APRESOLINE) 10 mg tablet Take 10 mg by mouth three (3) times daily.  carvedilol (COREG) 6.25 mg tablet Take 12.5 mg by mouth two (2) times daily (with meals).  levobunolol (BETAGAN) 0.5 % ophthalmic solution Administer 1 Drop to both eyes nightly.  insulin glargine (LANTUS) 100 unit/mL injection by SubCUTAneous route once.  atorvastatin (LIPITOR) 10 mg tablet Take 10 mg by mouth daily.  aspirin delayed-release 81 mg tablet Take  by mouth daily.  ergocalciferol (ERGOCALCIFEROL) 50,000 unit capsule Take 50,000 Units by mouth. 2 times per week  insulin lispro (HUMALOG) 100 unit/mL injection by SubCUTAneous route.  amLODIPine (NORVASC) 10 mg tablet Take  by mouth daily. No orders of the defined types were placed in this encounter. If you have questions, please do not hesitate to call me. I look forward to following  Ximena Marcum along with you. Sincerely, Isabela Aly MD

## 2019-03-19 NOTE — PATIENT INSTRUCTIONS
Heart-Healthy Diet: Care Instructions  Your Care Instructions    A heart-healthy diet has lots of vegetables, fruits, nuts, beans, and whole grains, and is low in salt. It limits foods that are high in saturated fat, such as meats, cheeses, and fried foods. It may be hard to change your diet, but even small changes can lower your risk of heart attack and heart disease. Follow-up care is a key part of your treatment and safety. Be sure to make and go to all appointments, and call your doctor if you are having problems. It's also a good idea to know your test results and keep a list of the medicines you take. How can you care for yourself at home? Watch your portions  · Learn what a serving is. A \"serving\" and a \"portion\" are not always the same thing. Make sure that you are not eating larger portions than are recommended. For example, a serving of pasta is ½ cup. A serving size of meat is 2 to 3 ounces. A 3-ounce serving is about the size of a deck of cards. Measure serving sizes until you are good at Jones" them. Keep in mind that restaurants often serve portions that are 2 or 3 times the size of one serving. · To keep your energy level up and keep you from feeling hungry, eat often but in smaller portions. · Eat only the number of calories you need to stay at a healthy weight. If you need to lose weight, eat fewer calories than your body burns (through exercise and other physical activity). Eat more fruits and vegetables  · Eat a variety of fruit and vegetables every day. Dark green, deep orange, red, or yellow fruits and vegetables are especially good for you. Examples include spinach, carrots, peaches, and berries. · Keep carrots, celery, and other veggies handy for snacks. Buy fruit that is in season and store it where you can see it so that you will be tempted to eat it. · Cook dishes that have a lot of veggies in them, such as stir-fries and soups.   Limit saturated and trans fat  · Read food labels, and try to avoid saturated and trans fats. They increase your risk of heart disease. Trans fat is found in many processed foods such as cookies and crackers. · Use olive or canola oil when you cook. Try cholesterol-lowering spreads, such as Benecol or Take Control. · Bake, broil, grill, or steam foods instead of frying them. · Choose lean meats instead of high-fat meats such as hot dogs and sausages. Cut off all visible fat when you prepare meat. · Eat fish, skinless poultry, and meat alternatives such as soy products instead of high-fat meats. Soy products, such as tofu, may be especially good for your heart. · Choose low-fat or fat-free milk and dairy products. Eat fish  · Eat at least two servings of fish a week. Certain fish, such as salmon and tuna, contain omega-3 fatty acids, which may help reduce your risk of heart attack. Eat foods high in fiber  · Eat a variety of grain products every day. Include whole-grain foods that have lots of fiber and nutrients. Examples of whole-grain foods include oats, whole wheat bread, and brown rice. · Buy whole-grain breads and cereals, instead of white bread or pastries. Limit salt and sodium  · Limit how much salt and sodium you eat to help lower your blood pressure. · Taste food before you salt it. Add only a little salt when you think you need it. With time, your taste buds will adjust to less salt. · Eat fewer snack items, fast foods, and other high-salt, processed foods. Check food labels for the amount of sodium in packaged foods. · Choose low-sodium versions of canned goods (such as soups, vegetables, and beans). Limit sugar  · Limit drinks and foods with added sugar. These include candy, desserts, and soda pop. Limit alcohol  · Limit alcohol to no more than 2 drinks a day for men and 1 drink a day for women. Too much alcohol can cause health problems. When should you call for help?   Watch closely for changes in your health, and be sure to contact your doctor if:    · You would like help planning heart-healthy meals. Where can you learn more? Go to http://aletha-marino.info/. Enter V137 in the search box to learn more about \"Heart-Healthy Diet: Care Instructions. \"  Current as of: 2018  Content Version: 11.9  © 4358-0672 [employer]. Care instructions adapted under license by [employer] (which disclaims liability or warranty for this information). If you have questions about a medical condition or this instruction, always ask your healthcare professional. [address] any warranty or liability for your use of this information. [email or web address] Activation    Thank you for requesting access to [email or web address]. Please follow the instructions below to securely access and download your online medical record. [email or web address] allows you to send messages to your doctor, view your test results, renew your prescriptions, schedule appointments, and more. How Do I Sign Up? 1. In your internet browser, go to [email or web address]. 2. Click on the First Time User? Click Here link in the Sign In box. You will see the New Member Sign Up page. 3. Enter your [email or web address] Access Code exactly as it appears below. You will not need to use this code after youve completed the sign-up process. If you do not sign up before the expiration date, you must request a new code. [email or web address] Access Code: [email or web address]  Expires: 5/3/2019 11:18 AM (This is the date your [email or web address] access code will )    4. Enter the last four digits of your Social Security Number (xxxx) and Date of Birth (mm/dd/yyyy) as indicated and click Submit. You will be taken to the next sign-up page. 5. Create a [email or web address] ID. This will be your [email or web address] login ID and cannot be changed, so think of one that is secure and easy to remember. 6. Create a [email or web address] password. You can change your password at any time.   7. Enter your Password Reset Question and Answer. This can be used at a later time if you forget your password. 8. Enter your e-mail address. You will receive e-mail notification when new information is available in [address] 9. Click Sign Up. You can now view and download portions of your medical record. 10. Click the Download Summary menu link to download a portable copy of your medical information. Additional Information    If you have questions, please visit the Frequently Asked Questions section of the [employer] website at [email or web address] Remember, [employer] is NOT to be used for urgent needs. For medical emergencies, dial 911.

## 2019-03-19 NOTE — PROGRESS NOTES
HISTORY OF PRESENT ILLNESS  Sherwin Plaza is a 80 y.o. male. Patient with chf,cmp,htn,ckd. On follow up patient denies any chest pains,sob, palpitation or other significant symptoms. Recent admission with ac chf-6/2017  Feels better now  Refusing dialysis    3/2019  - new diagnosis of right colon cancer and a large transverse colon polyp. Hypertension   The history is provided by the patient. This is a chronic problem. The problem occurs constantly. The problem has not changed since onset. Pertinent negatives include no chest pain, no abdominal pain, no headaches and no shortness of breath. Cholesterol Problem   Pertinent negatives include no chest pain, no abdominal pain, no headaches and no shortness of breath. Cardiomyopathy   The history is provided by the patient. This is a chronic problem. The problem occurs constantly. The problem has not changed since onset. Pertinent negatives include no chest pain, no abdominal pain, no headaches and no shortness of breath. CHF   The history is provided by the patient. This is a chronic problem. The problem occurs constantly. The problem has not changed since onset. Pertinent negatives include no chest pain, no abdominal pain, no headaches and no shortness of breath. Shortness of Breath   Pertinent negatives include no fever, no headaches, no cough, no sputum production, no hemoptysis, no wheezing, no PND, no orthopnea, no chest pain, no vomiting, no abdominal pain, no rash, no leg swelling and no claudication. Review of Systems   Constitutional: Negative for chills and fever. HENT: Negative for nosebleeds. Eyes: Negative for blurred vision and double vision. Respiratory: Negative for cough, hemoptysis, sputum production, shortness of breath and wheezing. Cardiovascular: Negative for chest pain, palpitations, orthopnea, claudication, leg swelling and PND. Gastrointestinal: Negative for abdominal pain, heartburn, nausea and vomiting. Musculoskeletal: Negative for myalgias. Skin: Negative for rash. Neurological: Negative for dizziness, weakness and headaches. Endo/Heme/Allergies: Does not bruise/bleed easily. Family History   Problem Relation Age of Onset    Diabetes Mother     Heart Disease Mother     Cancer Other        Past Medical History:   Diagnosis Date    CAD (coronary artery disease)     Chronic combined systolic and diastolic heart failure ([facility] 3/17/2015    stable limited activity recent admsission     Chronic kidney disease, unspecified 3/17/2015    not on acei/arb     Coronary atherosclerosis of native coronary artery 3/17/2015    abnormal stress test medically managed no angina     Diabetes ([facility] [facility]     Diabetes mellitus     Essential hypertension     Essential hypertension, benign     On Hydralazine, Labetalol, clonidine and norvasc HX of ARF and hyperkalemia with ARB; stable    High cholesterol     Hypertension     Other and unspecified hyperlipidemia     On Zocor and Niaspan    Other primary cardiomyopathies 3/17/2015    ef 45%     Peripheral vascular disease, unspecified (HCC)     Bilateral AKA    [facility], [facility]     Bilateral AKA     Reflux     Renal insufficiency     Stage 5 chronic kidney disease not on chronic dialysis ([facility] 9/10/2018       Past Surgical History:   Procedure Laterality Date    HX ORTHOPAEDIC      double amputation,  hip replacement    HX PROSTATECTOMY         Social History     Tobacco Use    Smoking status: Former Smoker     Packs/day: 2.00     Last attempt to quit: 1998     Years since quittin.0    Smokeless tobacco: Never Used   Substance Use Topics    Alcohol use: No       Allergies   Allergen Reactions    Vancomycin Vertigo           Visit Vitals  /46   Pulse 78   Ht 6' (1.829 m)   BMI 21.70 kg/m²         Physical Exam   Constitutional: He is oriented to person, place, and time.  He appears well-developed and well-nourished. HENT:   Head: Normocephalic and atraumatic. Eyes: Conjunctivae are normal.   Neck: Neck supple. No JVD present. No tracheal deviation present. No thyromegaly present. Cardiovascular: Normal rate and regular rhythm. Exam reveals gallop and S4. Exam reveals no friction rub. No murmur heard. Pulmonary/Chest: Breath sounds normal. No respiratory distress. He has no wheezes. He has no rales. He exhibits no tenderness. Abdominal: Soft. There is no tenderness. Musculoskeletal: He exhibits no edema. bilat amputation   Neurological: He is alert and oriented to person, place, and time. Skin: Skin is warm and dry. Psychiatric: He has a normal mood and affect. Mr. Terri Castro has a reminder for a \"due or due soon\" health maintenance. I have asked that he contact his primary care provider for follow-up on this health maintenance. CARDIOLOGY STUDIES 10/1/2010 11/1/2008   Myocardial Perfusion Scan Result - abn scan, suggestive Diapharagmatic attenuation with no reversible ischemia, EF 50%   Echocardiogram - Complete Result EF 60%, mild MR -   Some recent data might be hidden     01/09/15 1343 ECHOCARDIOGRAM COMPLETE   REDUCED GLOBAL LEFT VENTRICULAR SYSTOLIC FUNCTION. GLOBAL HYPOKINESIS WITH A VISUALLY  ESTIMATED EJECTION FRACTION OF 45%. MILD DIASTOLIC DYSFUNCTION. MILD CONCENTRIC LEFT VENTRICULAR HYPERTROPHY. MILDLY DILATED LEFT ATRIUM. NO HEMODYNAMICALLY SIGNIFICANT VALVULAR PATHOLOGY. NORMAL PULMONARY ARTERY PRESSURE OF 17 MMHG. CIRCUMFERENTIAL PERICARDIAL EFFUSION WITH NO HEMODYNAMIC COMPROMISE. COMPARED TO PREVIOUS REPORT ON 10/08/2010; EJECTION FRACTION HAS REDUCED FROM 60% TO 45%  Stress test:1/2015  PHARMACOLOGIC NUCLEAR STRESS TEST IS ABNORMAL. REST/STRESS SPECT IMAGES INDICATE PERFUSION DEFECTS ARE PRESENT. A SMALL REVERSIBLE DEFECT IS NOTED IN THE BASAL INFERIOR AREA OF MILD TO MODERATE INTENSITY.  A  SMALL REVERSIBLE DEFECT IS NOTED IN THE MID INFERIOR AREA OF MILD TO MODERATE INTENSITY. A SMALL  REVERSIBLE DEFECT IS NOTED IN THE APICAL INFERIOR AREA OF MILD TO MODERATE INTENSITY. LEFT  VENTRICLE APPEARS NORMAL ON BOTH STRESS AND REST. RIGHT VENTRICLE APPEARS NORMAL ON BOTH STRESS AND REST. GLOBAL HYPOKINESIS WITH EF 33%  I have personally reviewed patient's records available from hospital and other providers and incorporated findings in patient care. obici-7/2015  I Have personally reviewed recent relevant labs available and discussed with patient  Vania,cbc-7/2015  Impression   :echo-2/2017  NORMAL LEFT VENTRICULAR CAVITY SIZE AND SYSTOLIC FUNCTION WITH AN EJECTION FRACTION OF 55 %. MODERATE LEFT VENTRICULAR HYPERTROPHY PRESENT. MILD DIASTOLIC DYSFUNCTION. MODERATELY DILATED LEFT ATRIUM. MITRAL ANNULAR CALCIFICATION WITH MILD MITRAL REGURGITATION. SCLEROTIC TRILEAFLET AORTIC VALVE WITHOUT EVIDENCE OF STENOSIS. TRACE OF TRICUSPID REGURGITATION WITH A NORMAL PULMONARY ARTERY PRESSURE  STRUCTURALLY NORMAL PULMONIC VALVE WITH MILD PULMONIC REGURGITATION. TRIVIAL ANTERIOR AND SMALL POSTERIOR PERICARDIAL EFFUSION. NO MASSES, SHUNTS OR THROMBI SEEN. I have personally reviewed patient's records available from hospital and other providers and incorporated findings in patient care. 6/2017-notes,lab  5/2018-lab-    Assessment         ICD-10-CM ICD-9-CM    1. Atherosclerosis of native coronary artery of native heart without angina pectoris I25.10 414.01     Stable   2. Essential hypertension, benign I10 401.1     Stable   3. Hyperlipidemia, unspecified hyperlipidemia type E78.5 272.4     Continue statin   4. Diastolic CHF, chronic (HCC) I50.32 428.32      428.0     Appears compensated. 5. Stage 5 chronic kidney disease not on chronic dialysis (HCC) N18.5 585.5     Stable   6. Coronary artery disease involving native coronary artery of native heart without angina pectoris I25.10 414.01     Abnormal NST - unable to have cardiac cath due to CKD   7.  Malignant neoplasm of colon, unspecified part of colon ([facility] C18.9 153.9     Considering palliative chemo     3/19/2019 - Stable CAD with CKD. New diagnosis of colon cancer. He has discussed options with surgeon. Due to co morbidities -   Overall general perspective with history of CAD and vascular disease and likely carotid disease, CKD not on dialysis, history of systolic and diastolic CHF, he is high risk for any surgery. All discussed with patient and wife. He has verbalized he is not interested in surgery and is considering palliative chemo. There are no discontinued medications. No orders of the defined types were placed in this encounter. Follow-up Disposition:  Return in about 6 months (around 9/19/2019), or if symptoms worsen or fail to improve. I have independently evaluated taken history and examined the patient. All relevant labs and testing data's are reviewed. Care plan discussed and updated after review.   Jose Silva MD

## 2019-03-22 ENCOUNTER — OFFICE VISIT (OUTPATIENT)
Dept: ONCOLOGY | Age: 84
End: 2019-03-22

## 2019-03-22 DIAGNOSIS — D63.1 ANEMIA DUE TO STAGE 5 CHRONIC KIDNEY DISEASE (HCC): Primary | ICD-10-CM

## 2019-03-22 DIAGNOSIS — N18.5 ANEMIA DUE TO STAGE 5 CHRONIC KIDNEY DISEASE (HCC): Primary | ICD-10-CM

## 2019-05-14 ENCOUNTER — OFFICE VISIT (OUTPATIENT)
Dept: CARDIOLOGY CLINIC | Age: 84
End: 2019-05-14

## 2019-05-14 VITALS — DIASTOLIC BLOOD PRESSURE: 60 MMHG | HEART RATE: 87 BPM | SYSTOLIC BLOOD PRESSURE: 148 MMHG

## 2019-05-14 DIAGNOSIS — N18.5 STAGE 5 CHRONIC KIDNEY DISEASE NOT ON CHRONIC DIALYSIS (HCC): ICD-10-CM

## 2019-05-14 DIAGNOSIS — I25.10 ATHEROSCLEROSIS OF NATIVE CORONARY ARTERY OF NATIVE HEART WITHOUT ANGINA PECTORIS: Primary | ICD-10-CM

## 2019-05-14 DIAGNOSIS — E78.5 HYPERLIPIDEMIA, UNSPECIFIED HYPERLIPIDEMIA TYPE: ICD-10-CM

## 2019-05-14 DIAGNOSIS — I50.32 DIASTOLIC CHF, CHRONIC (HCC): ICD-10-CM

## 2019-05-14 DIAGNOSIS — I48.0 PAROXYSMAL ATRIAL FIBRILLATION (HCC): ICD-10-CM

## 2019-05-14 DIAGNOSIS — I10 ESSENTIAL HYPERTENSION, BENIGN: ICD-10-CM

## 2019-05-14 RX ORDER — DOCUSATE SODIUM 100 MG/1
100 CAPSULE, LIQUID FILLED ORAL AS NEEDED
COMMUNITY

## 2019-05-14 RX ORDER — CLONIDINE HYDROCHLORIDE 0.2 MG/1
TABLET ORAL 2 TIMES DAILY
COMMUNITY

## 2019-05-14 RX ORDER — PHENOL/SODIUM PHENOLATE
20 AEROSOL, SPRAY (ML) MUCOUS MEMBRANE DAILY
COMMUNITY

## 2019-05-14 NOTE — PROGRESS NOTES
1. Have you been to the ER, urgent care clinic since your last visit? Hospitalized since your last visit? Yes atul  2. Have you seen or consulted any other health care providers outside of the [address] [place] [address] since your last visit? Include any pap smears or colon screening. No     3. Since your last visit, have you had any of the following symptoms?  no

## 2019-05-14 NOTE — PROGRESS NOTES
HISTORY OF PRESENT ILLNESS  Bhargavi Hines is a 80 y.o. male. Patient with chf,cmp,htn,ckd. On follow up patient denies any chest pains,sob, palpitation or other significant symptoms. Recent admission with ac chf-6/2017  Feels better now  Refusing dialysis    3/2019  - new diagnosis of right colon cancer and a large transverse colon polyp. 5/2019  Admitted for 2019 with acute on chronic diastolic heart failure and multiple other problems. Recovering slowly since discharge. Short of breath on activity and exertion. Hypertension   The history is provided by the patient. This is a chronic problem. The problem occurs constantly. The problem has not changed since onset. Pertinent negatives include no chest pain, no abdominal pain, no headaches and no shortness of breath. Cholesterol Problem   Pertinent negatives include no chest pain, no abdominal pain, no headaches and no shortness of breath. Cardiomyopathy   The history is provided by the patient. This is a chronic problem. The problem occurs constantly. The problem has not changed since onset. Pertinent negatives include no chest pain, no abdominal pain, no headaches and no shortness of breath. CHF   The history is provided by the patient. This is a chronic problem. The problem occurs constantly. The problem has not changed since onset. Pertinent negatives include no chest pain, no abdominal pain, no headaches and no shortness of breath. Shortness of Breath   Pertinent negatives include no fever, no headaches, no cough, no sputum production, no hemoptysis, no wheezing, no PND, no orthopnea, no chest pain, no vomiting, no abdominal pain, no rash, no leg swelling and no claudication. Review of Systems   Constitutional: Negative for chills and fever. HENT: Negative for nosebleeds. Eyes: Negative for blurred vision and double vision. Respiratory: Negative for cough, hemoptysis, sputum production, shortness of breath and wheezing. Cardiovascular: Negative for chest pain, palpitations, orthopnea, claudication, leg swelling and PND. Gastrointestinal: Negative for abdominal pain, heartburn, nausea and vomiting. Musculoskeletal: Negative for myalgias. Skin: Negative for rash. Neurological: Negative for dizziness, weakness and headaches. Endo/Heme/Allergies: Does not bruise/bleed easily.      Family History   Problem Relation Age of Onset    Diabetes Mother     Heart Disease Mother     Cancer Other        Past Medical History:   Diagnosis Date    CAD (coronary artery disease)     Chronic combined systolic and diastolic heart failure ([facility] [address] 3/17/2015    stable limited activity recent admsission     Chronic kidney disease, unspecified 3/17/2015    not on acei/arb     Coronary atherosclerosis of native coronary artery 3/17/2015    abnormal stress test medically managed no angina     Diabetes ([facility] [address]     Diabetes mellitus     Essential hypertension     Essential hypertension, benign     On Hydralazine, Labetalol, clonidine and norvasc HX of ARF and hyperkalemia with ARB; stable    High cholesterol     Hypertension     Other and unspecified hyperlipidemia     On Zocor and Niaspan    Other primary cardiomyopathies 3/17/2015    ef 45%     Peripheral vascular disease, unspecified (HCC)     Bilateral AKA    Peripheral vascular disease, unspecified ([facility] [address]     Bilateral AKA     Reflux     Renal insufficiency     Stage 5 chronic kidney disease not on chronic dialysis [facility] 9/10/2018       Past Surgical History:   Procedure Laterality Date    HX ORTHOPAEDIC      double amputation,  hip replacement    HX PROSTATECTOMY         Social History     Tobacco Use    Smoking status: Former Smoker     Packs/day: 2.00     Last attempt to quit: 1998     Years since quittin.2    Smokeless tobacco: Never Used   Substance Use Topics    Alcohol use: No       Allergies   Allergen Reactions    Vancomycin Vertigo           Visit Vitals  /60   Pulse 87         Physical Exam   Constitutional: He is oriented to person, place, and time. He appears well-developed and well-nourished. HENT:   Head: Normocephalic and atraumatic. Eyes: Conjunctivae are normal.   Neck: Neck supple. No JVD present. No tracheal deviation present. No thyromegaly present. Cardiovascular: Normal rate. An irregular rhythm present. Exam reveals gallop and S4. Exam reveals no friction rub. Murmur heard. Holosystolic murmur is present at the lower left sternal border. Pulmonary/Chest: Breath sounds normal. No respiratory distress. He has no wheezes. He has no rales. He exhibits no tenderness. Abdominal: Soft. There is no tenderness. Musculoskeletal: He exhibits no edema. bilat amputation   Neurological: He is alert and oriented to person, place, and time. Skin: Skin is warm and dry. Psychiatric: He has a normal mood and affect. Mr. Cholo Ernst has a reminder for a \"due or due soon\" health maintenance. I have asked that he contact his primary care provider for follow-up on this health maintenance. CARDIOLOGY STUDIES 10/1/2010 11/1/2008   Myocardial Perfusion Scan Result - abn scan, suggestive Diapharagmatic attenuation with no reversible ischemia, EF 50%   Echocardiogram - Complete Result EF 60%, mild MR -   Some recent data might be hidden     01/09/15 1343 ECHOCARDIOGRAM COMPLETE   REDUCED GLOBAL LEFT VENTRICULAR SYSTOLIC FUNCTION. GLOBAL HYPOKINESIS WITH A VISUALLY  ESTIMATED EJECTION FRACTION OF 45%. MILD DIASTOLIC DYSFUNCTION. MILD CONCENTRIC LEFT VENTRICULAR HYPERTROPHY. MILDLY DILATED LEFT ATRIUM. NO HEMODYNAMICALLY SIGNIFICANT VALVULAR PATHOLOGY. NORMAL PULMONARY ARTERY PRESSURE OF 17 MMHG. CIRCUMFERENTIAL PERICARDIAL EFFUSION WITH NO HEMODYNAMIC COMPROMISE. COMPARED TO PREVIOUS REPORT ON 10/08/2010; EJECTION FRACTION HAS REDUCED FROM 60% TO 45%  Stress test:1/2015  PHARMACOLOGIC NUCLEAR STRESS TEST IS ABNORMAL.   REST/STRESS SPECT IMAGES INDICATE PERFUSION DEFECTS ARE PRESENT. A SMALL REVERSIBLE DEFECT IS NOTED IN THE BASAL INFERIOR AREA OF MILD TO MODERATE INTENSITY. A  SMALL REVERSIBLE DEFECT IS NOTED IN THE MID INFERIOR AREA OF MILD TO MODERATE INTENSITY. A SMALL  REVERSIBLE DEFECT IS NOTED IN THE APICAL INFERIOR AREA OF MILD TO MODERATE INTENSITY. LEFT  VENTRICLE APPEARS NORMAL ON BOTH STRESS AND REST. RIGHT VENTRICLE APPEARS NORMAL ON BOTH STRESS AND REST. GLOBAL HYPOKINESIS WITH EF 33%  I have personally reviewed patient's records available from hospital and other providers and incorporated findings in patient care. obici-7/2015  I Have personally reviewed recent relevant labs available and discussed with patient  Vania,cbc-7/2015  Impression   :echo-2/2017  NORMAL LEFT VENTRICULAR CAVITY SIZE AND SYSTOLIC FUNCTION WITH AN EJECTION FRACTION OF 55 %. MODERATE LEFT VENTRICULAR HYPERTROPHY PRESENT. MILD DIASTOLIC DYSFUNCTION. MODERATELY DILATED LEFT ATRIUM. MITRAL ANNULAR CALCIFICATION WITH MILD MITRAL REGURGITATION. SCLEROTIC TRILEAFLET AORTIC VALVE WITHOUT EVIDENCE OF STENOSIS. TRACE OF TRICUSPID REGURGITATION WITH A NORMAL PULMONARY ARTERY PRESSURE  STRUCTURALLY NORMAL PULMONIC VALVE WITH MILD PULMONIC REGURGITATION. TRIVIAL ANTERIOR AND SMALL POSTERIOR PERICARDIAL EFFUSION. NO MASSES, SHUNTS OR THROMBI SEEN. I have personally reviewed patient's records available from hospital and other providers and incorporated findings in patient care. 6/2017-notes,lab  5/2018-lab-  Echo Cardiogram Complete4/10/2019  [address]  Component Name Value Ref Range   EF Echo 56     Result Impression   :   NORMAL GLOBAL LEFT VENTRICULAR SYSTOLIC FUNCTION WITH A CALCULATED BI-PLANE OF 56 MMHG. MILD LEFT VENTRICULAR HYPERTROPHY PRESENT. NORMAL RIGHT VENTRICULAR SIZE WITH NORMAL FUNCTION. GRADE II DIASTOLIC DYSFUNCTION. DILATED LEFT AND RIGHT ATRIUM. MILD MITRAL AND TRICUSPID REGURGITATION.    MODERATE PULMONARY HYPERTENSION MEASURING AT 57 MMHG. 4/10/2019    Impression - BORDERLINE ECG -     Impression SR-Sinus rhythm-normal P axis, V-rate 50-99     Impression PLAE-Probable left atrial enlargement-P >50mS, <-0.10mV V1     Impression -nsr, r 81, n l axis, ns changes-             4/12/2019  Impression - ABNORMAL ECG -     Impression AFIB-Atrial ecchkekiorjo-W-ndjn 53-86, irreg A-activity     Impression -Atrial fibrillation is a new finding-        Assessment         ICD-10-CM ICD-9-CM    1. Atherosclerosis of native coronary artery of native heart without angina pectoris I25.10 414.01     Stable continue current medication   2. Essential hypertension, benign I10 401.1     Stable on treatment   3. Hyperlipidemia, unspecified hyperlipidemia type E78.5 272.4     Continue treatment lab with PCP   4. Diastolic CHF, chronic (HCC) I50.32 428.32      428.0     Stable compensated class 3   5. Stage 5 chronic kidney disease not on chronic dialysis (HCC) N18.5 585.5     Stable continue treatment   6. Paroxysmal atrial fibrillation (HCC) I48.0 427.31     Noted in the hospital with acute illness for 2019.     3/19/2019 - Stable CAD with CKD. New diagnosis of colon cancer. He has discussed options with surgeon. Due to co morbidities -   Overall general perspective with history of CAD and vascular disease and likely carotid disease, CKD not on dialysis, history of systolic and diastolic CHF, he is high risk for any surgery. All discussed with patient and wife. He has verbalized he is not interested in surgery and is considering palliative chemo. 5/2019  Recent admission with acute CHF as CKD and colon cancer which is managed conservatively. Prior GI bleed no recent GI bleed. Reported paroxysmal atrial fibrillation in the hospital not considered candidate for anticoagulation with very high GI bleed risk. We will continue with aspirin. I have discussed in detail risk benefit and options of anticoagulation medication to prevent thromboembolism. There are no discontinued medications. No orders of the defined types were placed in this encounter. Follow-up and Dispositions    · Return in about 3 months (around 8/14/2019).        Lidia Orlando MD

## 2019-05-14 NOTE — PATIENT INSTRUCTIONS
[employer] Activation    Thank you for requesting access to [employer]. Please follow the instructions below to securely access and download your online medical record. [employer] allows you to send messages to your doctor, view your test results, renew your prescriptions, schedule appointments, and more. How Do I Sign Up? 1. In your internet browser, go to [email or web address]. 2. Click on the First Time User? Click Here link in the Sign In box. You will see the New Member Sign Up page. 3. Enter your [employer] Access Code exactly as it appears below. You will not need to use this code after youve completed the sign-up process. If you do not sign up before the expiration date, you must request a new code. [employer] Access Code: [email or web address]  Expires: 2019 11:24 AM (This is the date your [employer] access code will )    4. Enter the last four digits of your Social Security Number (xxxx) and Date of Birth (mm/dd/yyyy) as indicated and click Submit. You will be taken to the next sign-up page. 5. Create a [employer] ID. This will be your [employer] login ID and cannot be changed, so think of one that is secure and easy to remember. 6. Create a [employer] password. You can change your password at any time. 7. Enter your Password Reset Question and Answer. This can be used at a later time if you forget your password. 8. Enter your e-mail address. You will receive e-mail notification when new information is available in [address]. 9. Click Sign Up. You can now view and download portions of your medical record. 10. Click the Download Summary menu link to download a portable copy of your medical information. Additional Information    If you have questions, please visit the Frequently Asked Questions section of the [employer] website at [email or web address] Remember, [employer] is NOT to be used for urgent needs. For medical emergencies, dial 911.
